# Patient Record
Sex: MALE | Race: WHITE | NOT HISPANIC OR LATINO | Employment: OTHER | URBAN - METROPOLITAN AREA
[De-identification: names, ages, dates, MRNs, and addresses within clinical notes are randomized per-mention and may not be internally consistent; named-entity substitution may affect disease eponyms.]

---

## 2017-10-04 ENCOUNTER — OFFICE VISIT (OUTPATIENT)
Dept: UROLOGY | Facility: CLINIC | Age: 72
End: 2017-10-04
Payer: MEDICARE

## 2017-10-04 VITALS
SYSTOLIC BLOOD PRESSURE: 109 MMHG | DIASTOLIC BLOOD PRESSURE: 71 MMHG | HEIGHT: 73 IN | BODY MASS INDEX: 22.21 KG/M2 | HEART RATE: 77 BPM | WEIGHT: 167.56 LBS

## 2017-10-04 DIAGNOSIS — N52.9 ERECTILE DYSFUNCTION, UNSPECIFIED ERECTILE DYSFUNCTION TYPE: Primary | ICD-10-CM

## 2017-10-04 LAB
BILIRUB SERPL-MCNC: NORMAL MG/DL
BLOOD URINE, POC: NORMAL
COLOR, POC UA: YELLOW
GLUCOSE UR QL STRIP: NORMAL
KETONES UR QL STRIP: NORMAL
LEUKOCYTE ESTERASE URINE, POC: NORMAL
NITRITE, POC UA: NORMAL
PH, POC UA: 5
PROTEIN, POC: NORMAL
SPECIFIC GRAVITY, POC UA: 1.03
UROBILINOGEN, POC UA: NORMAL

## 2017-10-04 PROCEDURE — 99213 OFFICE O/P EST LOW 20 MIN: CPT | Mod: PBBFAC,PO | Performed by: UROLOGY

## 2017-10-04 PROCEDURE — 81002 URINALYSIS NONAUTO W/O SCOPE: CPT | Mod: PBBFAC,PO | Performed by: UROLOGY

## 2017-10-04 PROCEDURE — 99999 PR PBB SHADOW E&M-EST. PATIENT-LVL III: CPT | Mod: PBBFAC,,, | Performed by: UROLOGY

## 2017-10-04 PROCEDURE — 99213 OFFICE O/P EST LOW 20 MIN: CPT | Mod: S$PBB,,, | Performed by: UROLOGY

## 2017-10-04 NOTE — PROGRESS NOTES
Subjective:       Patient ID: CHARISSE Bernal III is a 72 y.o. male.    Chief Complaint: Erectile Dysfunction    HPI     72 year old with a history of kidney stone.  He now complains of ED which has been a long term problem.   He has tried Viagra in the past up to 100 mg which was ineffective.  There was some fullness but the erection was no firm enough for penetration.   He has no voiding complaints.    Urine dipstick shows negative for all components.    Review of Systems   Constitutional: Negative for fever.   Genitourinary: Negative for dysuria and hematuria.       Objective:      Physical Exam   Constitutional: He is oriented to person, place, and time. He appears well-developed and well-nourished.   Pulmonary/Chest: Effort normal.   Abdominal: Soft.   Neurological: He is alert and oriented to person, place, and time.   Skin: No rash noted.   Psychiatric: He has a normal mood and affect.   Vitals reviewed.      Assessment:       1. Erectile dysfunction, unspecified erectile dysfunction type        Plan:       Erectile dysfunction, unspecified erectile dysfunction type  -     POCT URINE DIPSTICK WITHOUT MICROSCOPE      We discussed all treatment options for ED.   Rx Trimix given.  RTC for demo.

## 2017-10-06 ENCOUNTER — TELEPHONE (OUTPATIENT)
Dept: UROLOGY | Facility: CLINIC | Age: 72
End: 2017-10-06

## 2017-10-06 NOTE — TELEPHONE ENCOUNTER
Spoke to pt and scheduled him to get instructions on trimix for next Friday. Pt was saying he was going to figure this out and just give the injection to himself. I instructed him not to do that and that he could end up in the ER.

## 2017-10-06 NOTE — TELEPHONE ENCOUNTER
----- Message from Diana Adkins sent at 10/5/2017  4:53 PM CDT -----  Contact: self [phone]  Call placed to pod. He was calling to schedule an appt, my 1st available is 10/12, he says he needs to be seen sooner.  Please call him about fitting him in.  Thank you!

## 2017-10-13 ENCOUNTER — OFFICE VISIT (OUTPATIENT)
Dept: UROLOGY | Facility: CLINIC | Age: 72
End: 2017-10-13
Payer: MEDICARE

## 2017-10-13 VITALS
BODY MASS INDEX: 22.21 KG/M2 | DIASTOLIC BLOOD PRESSURE: 68 MMHG | HEART RATE: 75 BPM | WEIGHT: 167.56 LBS | HEIGHT: 73 IN | SYSTOLIC BLOOD PRESSURE: 118 MMHG

## 2017-10-13 DIAGNOSIS — N52.9 IMPOTENCE: Primary | ICD-10-CM

## 2017-10-13 PROCEDURE — 99999 PR PBB SHADOW E&M-EST. PATIENT-LVL III: CPT | Mod: PBBFAC,,, | Performed by: UROLOGY

## 2017-10-13 PROCEDURE — 54235 NJX CORPORA CAVERNOSA RX AGT: CPT | Mod: PBBFAC,PO | Performed by: UROLOGY

## 2017-10-13 PROCEDURE — 99213 OFFICE O/P EST LOW 20 MIN: CPT | Mod: PBBFAC,PO,25 | Performed by: UROLOGY

## 2017-10-13 PROCEDURE — 54235 NJX CORPORA CAVERNOSA RX AGT: CPT | Mod: S$PBB,,, | Performed by: UROLOGY

## 2017-10-13 PROCEDURE — 99213 OFFICE O/P EST LOW 20 MIN: CPT | Mod: 25,S$PBB,, | Performed by: UROLOGY

## 2017-10-13 NOTE — PROGRESS NOTES
Subjective:       Patient ID: H Sergei Bernal III is a 72 y.o. male.    Chief Complaint: Follow-up (instructions for Trimix)    HPI     72 year old with ED.  Failed medical management.  He has filled an Rx for WW solution and is here for demonstration.    I demonstrated proper technique and I injected  0.45 cc of WW solution in the the corpora.  Moderate engorgement was noted after 15 minutes.  He had no immediate adverse reaction    Review of Systems   Constitutional: Negative for fever.   Genitourinary: Negative for dysuria and hematuria.       Objective:      Physical Exam   Constitutional: He is oriented to person, place, and time. He appears well-developed and well-nourished.   Pulmonary/Chest: Effort normal.   Abdominal: Soft.   Neurological: He is alert and oriented to person, place, and time.   Skin: No rash noted.   Psychiatric: He has a normal mood and affect.   Vitals reviewed.      Assessment:       1. Impotence        Plan:       Impotence      Titrate dose up as needed.

## 2017-10-26 ENCOUNTER — OFFICE VISIT (OUTPATIENT)
Dept: UROLOGY | Facility: CLINIC | Age: 72
End: 2017-10-26
Payer: MEDICARE

## 2017-10-26 VITALS — WEIGHT: 176.38 LBS | HEIGHT: 73 IN | BODY MASS INDEX: 23.37 KG/M2

## 2017-10-26 DIAGNOSIS — N52.9 IMPOTENCE: Primary | ICD-10-CM

## 2017-10-26 PROCEDURE — 99212 OFFICE O/P EST SF 10 MIN: CPT | Mod: PBBFAC,PO | Performed by: UROLOGY

## 2017-10-26 PROCEDURE — 99213 OFFICE O/P EST LOW 20 MIN: CPT | Mod: S$PBB,,, | Performed by: UROLOGY

## 2017-10-26 PROCEDURE — 99999 PR PBB SHADOW E&M-EST. PATIENT-LVL II: CPT | Mod: PBBFAC,,, | Performed by: UROLOGY

## 2017-10-26 NOTE — PROGRESS NOTES
Subjective:       Patient ID: H Sergei Bernal III is a 72 y.o. male.    Chief Complaint: Follow-up    HPI     72 year old with ED.  Failed medical management.  He has filled an Rx for WW solution and tried it at home and the full dose was not very effective.  He wants to try Caverject which is covered by his insurance.      Review of Systems   Constitutional: Negative for fever.   Genitourinary: Negative for dysuria and hematuria.       Objective:      Physical Exam   Constitutional: He is oriented to person, place, and time. He appears well-developed and well-nourished.   Pulmonary/Chest: Effort normal.   Abdominal: Soft.   Neurological: He is alert and oriented to person, place, and time.   Skin: No rash noted.   Psychiatric: He has a normal mood and affect.   Vitals reviewed.      Assessment:       1. Impotence        Plan:       Impotence    Other orders  -     alprostadil (CAVERJECT IMPULSE) 20 mcg injection; 20 mcg by Intracavitary route as needed for Erectile Dysfunction. use no more than 3 times per week  Dispense: 2 vial; Refill: 6

## 2017-10-27 ENCOUNTER — TELEPHONE (OUTPATIENT)
Dept: UROLOGY | Facility: CLINIC | Age: 72
End: 2017-10-27

## 2017-10-27 NOTE — TELEPHONE ENCOUNTER
----- Message from Meredith Regalado sent at 10/27/2017  3:27 PM CDT -----  [facility] at [phone] called regarding medication: Caverject for above patient. They are requesting Rx clarification on quantity/stated wants two vials. Thanks!

## 2018-01-08 ENCOUNTER — OFFICE VISIT (OUTPATIENT)
Dept: URGENT CARE | Facility: CLINIC | Age: 73
End: 2018-01-08
Payer: MEDICARE

## 2018-01-08 VITALS
DIASTOLIC BLOOD PRESSURE: 71 MMHG | SYSTOLIC BLOOD PRESSURE: 106 MMHG | OXYGEN SATURATION: 98 % | RESPIRATION RATE: 14 BRPM | HEART RATE: 65 BPM | TEMPERATURE: 98 F

## 2018-01-08 DIAGNOSIS — J40 BRONCHITIS: Primary | ICD-10-CM

## 2018-01-08 PROCEDURE — 99203 OFFICE O/P NEW LOW 30 MIN: CPT | Mod: S$GLB,,, | Performed by: PHYSICIAN ASSISTANT

## 2018-01-08 PROCEDURE — 96372 THER/PROPH/DIAG INJ SC/IM: CPT | Mod: S$GLB,,, | Performed by: EMERGENCY MEDICINE

## 2018-01-08 RX ORDER — BETAMETHASONE SODIUM PHOSPHATE AND BETAMETHASONE ACETATE 3; 3 MG/ML; MG/ML
9 INJECTION, SUSPENSION INTRA-ARTICULAR; INTRALESIONAL; INTRAMUSCULAR; SOFT TISSUE ONCE
Status: COMPLETED | OUTPATIENT
Start: 2018-01-08 | End: 2018-01-08

## 2018-01-08 RX ORDER — AZITHROMYCIN 250 MG/1
TABLET, FILM COATED ORAL
Qty: 6 TABLET | Refills: 0 | Status: SHIPPED | OUTPATIENT
Start: 2018-01-08 | End: 2018-06-01

## 2018-01-08 RX ORDER — BENZONATATE 200 MG/1
200 CAPSULE ORAL 3 TIMES DAILY PRN
Qty: 30 CAPSULE | Refills: 0 | Status: SHIPPED | OUTPATIENT
Start: 2018-01-08 | End: 2018-01-18

## 2018-01-08 RX ADMIN — BETAMETHASONE SODIUM PHOSPHATE AND BETAMETHASONE ACETATE 9 MG: 3; 3 INJECTION, SUSPENSION INTRA-ARTICULAR; INTRALESIONAL; INTRAMUSCULAR; SOFT TISSUE at 12:01

## 2018-01-08 NOTE — PROGRESS NOTES
Subjective:       Patient ID: CHARISSE Bernal III is a 72 y.o. male.    Vitals:  oral temperature is 98.2 °F (36.8 °C). His blood pressure is 106/71 and his pulse is 65. His respiration is 14 and oxygen saturation is 98%.     Chief Complaint: Cough    PT C/O PRODUCTIVE COUGH, 1 MONTH, CHEST CONGESTION, SORE THROAT,       Cough   This is a new problem. The current episode started 1 to 4 weeks ago. The problem has been unchanged. The problem occurs constantly. The cough is productive of sputum. Associated symptoms include a sore throat. Pertinent negatives include no chest pain, chills, ear pain, eye redness, fever, headaches, hemoptysis, myalgias, nasal congestion, postnasal drip, rash, shortness of breath or wheezing. He has tried nothing for the symptoms.     Review of Systems   Constitution: Negative for chills, fever and malaise/fatigue.   HENT: Positive for sore throat. Negative for congestion, ear pain, hoarse voice and postnasal drip.    Eyes: Negative for blurred vision, discharge, pain, redness and visual disturbance.   Cardiovascular: Negative for chest pain, dyspnea on exertion, leg swelling, near-syncope and syncope.   Respiratory: Positive for cough and sputum production. Negative for hemoptysis, shortness of breath and wheezing.    Hematologic/Lymphatic: Negative for adenopathy.   Skin: Negative for itching and rash.   Musculoskeletal: Negative for back pain, myalgias, neck pain and stiffness.   Gastrointestinal: Negative for abdominal pain, diarrhea, nausea and vomiting.   Neurological: Negative for dizziness, headaches, light-headedness and numbness.   Psychiatric/Behavioral: Negative for altered mental status.   Allergic/Immunologic: Negative for hives.   All other systems reviewed and are negative.      Objective:      Physical Exam   Constitutional: He is oriented to person, place, and time. He appears well-developed and well-nourished.  Non-toxic appearance. He does not have a sickly appearance. He  does not appear ill. No distress.   HENT:   Head: Normocephalic and atraumatic. Head is without right periorbital erythema and without left periorbital erythema.   Right Ear: Hearing, tympanic membrane, external ear and ear canal normal.   Left Ear: Hearing, tympanic membrane, external ear and ear canal normal.   Nose: No mucosal edema. No epistaxis. Right sinus exhibits no maxillary sinus tenderness and no frontal sinus tenderness. Left sinus exhibits no maxillary sinus tenderness and no frontal sinus tenderness.   Mouth/Throat: Uvula is midline and mucous membranes are normal. No uvula swelling. Posterior oropharyngeal erythema present. No oropharyngeal exudate.   Eyes: Pupils are equal, round, and reactive to light.   Neck: Normal range of motion. Neck supple.   Cardiovascular: Normal rate, regular rhythm and normal heart sounds.  Exam reveals no gallop and no friction rub.    No murmur heard.  Pulmonary/Chest: Effort normal. No accessory muscle usage. No tachypnea and no bradypnea. No respiratory distress. He has no decreased breath sounds. He has no wheezes. He has rhonchi (moderate) in the right middle field and the left middle field. He has no rales. He exhibits no tenderness and no crepitus.   Musculoskeletal: Normal range of motion.   Lymphadenopathy:        Head (right side): No submental, no submandibular, no tonsillar, no preauricular, no posterior auricular and no occipital adenopathy present.        Head (left side): No submental, no submandibular, no tonsillar, no preauricular, no posterior auricular and no occipital adenopathy present.     He has no cervical adenopathy.        Right cervical: No posterior cervical adenopathy present.       Left cervical: No posterior cervical adenopathy present.        Right: No supraclavicular adenopathy present.        Left: No supraclavicular adenopathy present.   Neurological: He is alert and oriented to person, place, and time. He is not disoriented. No cranial  nerve deficit. Coordination and gait normal.   Skin: No abrasion, no ecchymosis, no laceration and no rash noted. No erythema.   Psychiatric: He has a normal mood and affect. His behavior is normal.   Nursing note and vitals reviewed.      Assessment:       1. Bronchitis        Plan:         Bronchitis  -     azithromycin (ZITHROMAX Z-PROSPER) 250 MG tablet; Two tablets once on day one followed by one tablet daily for 5 days  Dispense: 6 tablet; Refill: 0  -     benzonatate (TESSALON) 200 MG capsule; Take 1 capsule (200 mg total) by mouth 3 (three) times daily as needed for Cough.  Dispense: 30 capsule; Refill: 0  -     betamethasone acetate-betamethasone sodium phosphate injection 9 mg; Inject 1.5 mLs (9 mg total) into the muscle once.

## 2018-01-08 NOTE — PATIENT INSTRUCTIONS
- Please return here or go to the Emergency Department for any concerns or worsening of condition.   - If you were prescribed antibiotics, please take them to completion.  - Please follow up with your primary care provider (PCP) or discussed specialist(s) as needed.             Acute Bronchitis  Your healthcare provider has told you that you have acute bronchitis. Bronchitis is infection or inflammation of the bronchial tubes (airways in the lungs). Normally, air moves easily in and out of the airways. Bronchitis narrows the airways, making it harder for air to flow in and out of the lungs. This causes symptoms such as shortness of breath, coughing up yellow or green mucus, and wheezing. Bronchitis can be acute or chronic. Acute means the condition comes on quickly and goes away in a short time, usually within 3 to 10 days. Chronic means a condition lasts a long time and often comes back.    What causes acute bronchitis?  Acute bronchitis almost always starts as a viral respiratory infection, such as a cold or the flu. Certain factors make it more likely for a cold or flu to turn into bronchitis. These include being very young, being elderly, having a heart or lung problem, or having a weak immune system. Cigarette smoking also makes bronchitis more likely.  When bronchitis develops, the airways become swollen. The airways may also become infected with bacteria. This is known as a secondary infection.  Diagnosing acute bronchitis  Your healthcare provider will examine you and ask about your symptoms and health history. You may also have a sputum culture to test the fluid in your lungs. Chest X-rays may be done to look for infection in the lungs.  Treating acute bronchitis  Bronchitis usually clears up as the cold or flu goes away. You can help feel better faster by doing the following:  · Take medicine as directed. You may be told to take ibuprofen or other over-the-counter medicines. These help relieve inflammation  in your bronchial tubes. Your healthcare provider may prescribe an inhaler to help open up the bronchial tubes. Most of the time, acute bronchitis is caused by a viral infection. Antibiotics are usually not prescribed for viral infections.  · Drink plenty of fluids, such as water, juice, or warm soup. Fluids loosen mucus so that you can cough it up. This helps you breathe more easily. Fluids also prevent dehydration.  · Make sure you get plenty of rest.  · Do not smoke. Do not allow anyone else to smoke in your home.  Recovery and follow-up  Follow up with your doctor as you are told. You will likely feel better in a week or two. But a dry cough can linger beyond that time. Let your doctor know if you still have symptoms (other than a dry cough) after 2 weeks, or if youre prone to getting bronchial infections. Take steps to protect yourself from future infections. These steps include stopping smoking and avoiding tobacco smoke, washing your hands often, and getting a yearly flu shot.  When to call your healthcare provider  Call the healthcare provider if you have any of the following:  · Fever of 100.4°F (38.0°C) or higher, or as advised  · Symptoms that get worse, or new symptoms  · Trouble breathing  · Symptoms that dont start to improve within a week, or within 3 days of taking antibiotics   Date Last Reviewed: 12/1/2016  © 8417-3734 [employer]. [address] [place] [address], [place] [address]. All rights reserved. This information is not intended as a substitute for professional medical care. Always follow your healthcare professional's instructions.

## 2018-05-16 ENCOUNTER — OFFICE VISIT (OUTPATIENT)
Dept: URGENT CARE | Facility: CLINIC | Age: 73
End: 2018-05-16
Payer: MEDICARE

## 2018-05-16 VITALS
OXYGEN SATURATION: 96 % | SYSTOLIC BLOOD PRESSURE: 101 MMHG | DIASTOLIC BLOOD PRESSURE: 65 MMHG | HEART RATE: 66 BPM | TEMPERATURE: 98 F | HEIGHT: 69 IN | WEIGHT: 176 LBS | BODY MASS INDEX: 26.07 KG/M2
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
## 2018-05-16 DIAGNOSIS — S80.862A INSECT BITE OF LEFT LOWER EXTREMITY, INITIAL ENCOUNTER: Primary | ICD-10-CM

## 2018-05-16 PROCEDURE — 99203 OFFICE O/P NEW LOW 30 MIN: CPT | Mod: S$GLB,,, | Performed by: EMERGENCY MEDICINE

## 2018-05-16 RX ORDER — MUPIROCIN 20 MG/G
OINTMENT TOPICAL
Qty: 22 G | Refills: 1 | Status: SHIPPED | OUTPATIENT
Start: 2018-05-16 | End: 2021-07-20

## 2018-05-16 RX ORDER — CLINDAMYCIN HYDROCHLORIDE 300 MG/1
300 CAPSULE ORAL 3 TIMES DAILY
Qty: 30 CAPSULE | Refills: 0 | Status: SHIPPED | OUTPATIENT
Start: 2018-05-16 | End: 2018-05-26

## 2018-05-16 NOTE — PATIENT INSTRUCTIONS
Cellulitis  Cellulitis is an infection of the deep layers of skin. A break in the skin, such as a cut or scratch, can let bacteria under the skin. If the bacteria get to deep layers of the skin, it can be serious. If not treated, cellulitis can get into the bloodstream and lymph nodes. The infection can then spread throughout the body. This causes serious illness.  Cellulitis causes the affected skin to become red, swollen, warm, and sore. The reddened areas have a visible border. An open sore may leak fluid (pus). You may have a fever, chills, and pain.  Cellulitis is treated with antibiotics taken for 7 to 10 days. An open sore may be cleaned and covered with cool wet gauze. Symptoms should get better 1 to 2 days after treatment is started. Make sure to take all the antibiotics for the full number of days until they are gone. Keep taking the medicine even if your symptoms go away.  Home care  Follow these tips:  · Limit the use of the part of your body with cellulitis.   · If the infection is on your leg, keep your leg raised while sitting. This will help to reduce swelling.  · Take all of the antibiotic medicine exactly as directed until it is gone. Do not miss any doses, especially during the first 7 days. Dont stop taking the medicine when your symptoms get better.  · Keep the affected area clean and dry.  · Wash your hands with soap and warm water before and after touching your skin. Anyone else who touches your skin should also wash his or her hands. Don't share towels.  Follow-up care  Follow up with your healthcare provider, or as advised. If your infection does not go away on the first antibiotic, your healthcare provider will prescribe a different one.  When to seek medical advice  Call your healthcare provider right away if any of these occur:  · Red areas that spread  · Swelling or pain that gets worse  · Fluid leaking from the skin (pus)  · Fever higher of 100.4º F (38.0º C) or higher after 2 days  on antibiotics  Date Last Reviewed: 9/1/2016  © 0068-1403 [employer]. [address] [place] [address], [place] [address]. All rights reserved. This information is not intended as a substitute for professional medical care. Always follow your healthcare professional's instructions.

## 2018-05-16 NOTE — PROGRESS NOTES
"Subjective:       Patient ID: CHARISSE Bernal III is a 73 y.o. male.    Vitals:  height is 5' 9" (1.753 m) and weight is 79.8 kg (176 lb). His oral temperature is 98.1 °F (36.7 °C). His blood pressure is 101/65 and his pulse is 66. His oxygen saturation is 96%.     Chief Complaint: Insect Bite (left thigh)    Insect Bite   This is a new problem. The current episode started in the past 7 days. The problem occurs daily. The problem has been gradually worsening. Pertinent negatives include no chills, fever, rash or sore throat. The symptoms are aggravated by walking. He has tried heat for the symptoms. The treatment provided mild relief.     Review of Systems   Constitution: Negative for chills and fever.   HENT: Negative for sore throat.    Respiratory: Negative for shortness of breath.    Skin: Positive for color change (warm to the touch, hard). Negative for itching and rash.   Musculoskeletal: Negative for joint pain.       Objective:      Physical Exam   Constitutional: He is oriented to person, place, and time. He appears well-developed and well-nourished.   HENT:   Head: Normocephalic and atraumatic. Head is without abrasion, without contusion and without laceration.   Right Ear: External ear normal.   Left Ear: External ear normal.   Nose: Nose normal.   Mouth/Throat: Oropharynx is clear and moist.   Eyes: Conjunctivae, EOM and lids are normal. Pupils are equal, round, and reactive to light.   Neck: Trachea normal, full passive range of motion without pain and phonation normal. Neck supple.   Cardiovascular: Normal rate, regular rhythm and normal heart sounds.    Pulmonary/Chest: Effort normal and breath sounds normal. No stridor. No respiratory distress.   Musculoskeletal: Normal range of motion.   Neurological: He is alert and oriented to person, place, and time.   Skin: Skin is warm, dry and intact. Capillary refill takes less than 2 seconds. No abrasion, no bruising, no burn, no ecchymosis, no laceration, no " lesion and no rash noted. No erythema.        Psychiatric: He has a normal mood and affect. His speech is normal and behavior is normal. Judgment and thought content normal. Cognition and memory are normal.   Nursing note and vitals reviewed.      Assessment:       1. Insect bite of left lower extremity, initial encounter        Plan:         Insect bite of left lower extremity, initial encounter  -     mupirocin (BACTROBAN) 2 % ointment; Apply to affected area 3 times daily  Dispense: 22 g; Refill: 1  -     clindamycin (CLEOCIN) 300 MG capsule; Take 1 capsule (300 mg total) by mouth 3 (three) times daily.  Dispense: 30 capsule; Refill: 0

## 2018-05-31 ENCOUNTER — CLINICAL SUPPORT (OUTPATIENT)
Dept: UROLOGY | Facility: CLINIC | Age: 73
End: 2018-05-31
Payer: MEDICARE

## 2018-05-31 ENCOUNTER — TELEPHONE (OUTPATIENT)
Dept: UROLOGY | Facility: CLINIC | Age: 73
End: 2018-05-31

## 2018-05-31 DIAGNOSIS — N52.01 ERECTILE DYSFUNCTION DUE TO ARTERIAL INSUFFICIENCY: Primary | ICD-10-CM

## 2018-05-31 PROCEDURE — 99211 OFF/OP EST MAY X REQ PHY/QHP: CPT | Mod: PBBFAC,PO

## 2018-05-31 PROCEDURE — 99999 PR PBB SHADOW E&M-EST. PATIENT-LVL I: CPT | Mod: PBBFAC,,,

## 2018-05-31 NOTE — PROGRESS NOTES
Patient walked in to discuss a refill on his Caverject prescription but wants to double the dose of Caverject.  Explained that he would need to discuss with Dr. Gonzales and an appointment was scheduled for him tomorrow, 6/1/18.

## 2018-05-31 NOTE — TELEPHONE ENCOUNTER
----- Message from Rafaela Jo sent at 5/31/2018 12:35 PM CDT -----  Contact: self cell no. [phone]   Pt came in clinic at 1236 today,, 05 31 2018,,, stated he would like change on his medication,, will come back after lunch and speak to nurse , thanks

## 2018-05-31 NOTE — TELEPHONE ENCOUNTER
Left message for pt to call office back. I relayed this message to pt's wife and she is going to let him know.

## 2018-06-01 ENCOUNTER — OFFICE VISIT (OUTPATIENT)
Dept: UROLOGY | Facility: CLINIC | Age: 73
End: 2018-06-01
Payer: MEDICARE

## 2018-06-01 ENCOUNTER — TELEPHONE (OUTPATIENT)
Dept: UROLOGY | Facility: CLINIC | Age: 73
End: 2018-06-01

## 2018-06-01 VITALS
HEART RATE: 76 BPM | WEIGHT: 168.19 LBS | BODY MASS INDEX: 24.91 KG/M2 | HEIGHT: 69 IN | SYSTOLIC BLOOD PRESSURE: 122 MMHG | DIASTOLIC BLOOD PRESSURE: 70 MMHG

## 2018-06-01 DIAGNOSIS — N52.9 ERECTILE DYSFUNCTION, UNSPECIFIED ERECTILE DYSFUNCTION TYPE: Primary | ICD-10-CM

## 2018-06-01 PROCEDURE — 99213 OFFICE O/P EST LOW 20 MIN: CPT | Mod: S$PBB,,, | Performed by: UROLOGY

## 2018-06-01 PROCEDURE — 99213 OFFICE O/P EST LOW 20 MIN: CPT | Mod: PBBFAC,PO | Performed by: UROLOGY

## 2018-06-01 PROCEDURE — 81002 URINALYSIS NONAUTO W/O SCOPE: CPT | Mod: PBBFAC,PO | Performed by: UROLOGY

## 2018-06-01 PROCEDURE — 99999 PR PBB SHADOW E&M-EST. PATIENT-LVL III: CPT | Mod: PBBFAC,,, | Performed by: UROLOGY

## 2018-06-01 NOTE — PROGRESS NOTES
Subjective:       Patient ID: H Sergei Bernal III is a 73 y.o. male.    Chief Complaint: Follow-up    HPI     72 year old with ED.  Failed medical management.  He has filled an Rx for Caverject 20 mc and has been using it will good results but feels that he could benefit from a higher dose.  He loses his erection when he lays down.    Urine dipstick shows negative for all components.    Review of Systems   Constitutional: Negative for fever.   Genitourinary: Negative for dysuria and hematuria.       Objective:      Physical Exam   Constitutional: He is oriented to person, place, and time. He appears well-developed and well-nourished.   Pulmonary/Chest: Effort normal.   Abdominal: Soft.   Neurological: He is alert and oriented to person, place, and time.   Skin: No rash noted.   Psychiatric: He has a normal mood and affect.   Vitals reviewed.      Assessment:       1. Erectile dysfunction, unspecified erectile dysfunction type        Plan:       Erectile dysfunction, unspecified erectile dysfunction type  -     POCT URINE DIPSTICK WITHOUT MICROSCOPE    Other orders  -     Discontinue: alprostadil 40 mcg SolR; 1 Syringe by Intracavernosal route daily as needed.  Dispense: 6 each; Refill: 11  -     alprostadil (CAVERJECT) 40 mcg SolR; 1 Syringe by Intracavernosal route daily as needed.  Dispense: 6 each; Refill: 11

## 2018-06-01 NOTE — TELEPHONE ENCOUNTER
----- Message from Iveth Rodrgiuez sent at 6/1/2018  1:09 PM CDT -----  Contact: Emi with [facility] [phone]  Emi with [facility] [phone] calling to speak with nurse in office regarding the prescription alprostadil (CAVERJECT) 40 mcg SolR. Please advise. Thanks.

## 2018-06-05 RX ORDER — ALPROSTADIL 20 UG/.5ML
INJECTION, POWDER, LYOPHILIZED, FOR SOLUTION INTRACAVERNOUS
Refills: 0 | OUTPATIENT
Start: 2018-06-05

## 2018-06-05 NOTE — TELEPHONE ENCOUNTER
----- Message from Mayra Vargas sent at 6/5/2018  4:50 PM CDT -----  Contact: Torrie Miguel  Type:  Pharmacy Calling to Clarify an RX    Name of Caller:  Torrie  Pharmacy Name:  [facility]  Prescription Name: Caverject 20 mcg impulse  What do they need to clarify?:  They have the 40 mcg and it is refrigerated and he would have mix it.  He doesn't want this he only wants what he had which is th 20 mcg, it is reloaded in a syringe.    Best Call Back Number:  [phone]  Additional Information:

## 2018-06-06 RX ORDER — ALPROSTADIL 20 UG/.5ML
INJECTION, POWDER, LYOPHILIZED, FOR SOLUTION INTRACAVERNOUS
Qty: 6 VIAL | Refills: 11 | Status: SHIPPED | OUTPATIENT
Start: 2018-06-06 | End: 2021-07-20

## 2018-10-22 ENCOUNTER — DOCUMENTATION ONLY (OUTPATIENT)
Dept: FAMILY MEDICINE | Facility: CLINIC | Age: 73
End: 2018-10-22

## 2018-10-22 NOTE — PROGRESS NOTES
Records from Dr. Kevin Ivy received and placed in appointment accordion for Dr. Anna.  This pt is not scheduled to be seen in this dept until 1/15/19

## 2019-01-02 ENCOUNTER — PATIENT OUTREACH (OUTPATIENT)
Dept: ADMINISTRATIVE | Facility: HOSPITAL | Age: 74
End: 2019-01-02

## 2019-01-02 NOTE — LETTER
January 2, 2019    CHARISSE Bernal III  [address] [address]             [facility]  [address]  [place] [address]  Phone: [phone] Dear Mr. Bernal:    [facility] is committed to your overall health.  To help you get the most out of each of your visits, we will review your information to make sure you are up to date on all of your recommended tests and/or procedures.      NEYDA Ivy MD    has found that your chart shows you may be due for the following:    Tetanus Immunization  Colon cancer screening  Shingles Immunization  Pneumonia Immunization  Influenza Vaccine    If you have had any of the above done at another facility, please bring the records or information with you so that your record at [facility] will be complete.  If you would like to schedule any of these, please contact me.    If you are currently taking medication, please bring it with you to your appointment for review.    Sincerely,    Neetu Brantley  Clinical Care Coordinator  [facility] [address] [place] [address]  Phone: [phone]   Fax: [phone]

## 2019-01-02 NOTE — PROGRESS NOTES
Health Maintenance Due   Topic Date Due    TETANUS VACCINE  05/15/1963    Colonoscopy  05/15/1993    Zoster Vaccine  05/15/2003    Pneumococcal Vaccine (65+ Low/Medium Risk) (1 of 2 - PCV13) 05/15/2008    Influenza Vaccine  08/01/2018     Pre-visit outreach via mail

## 2019-01-15 ENCOUNTER — OFFICE VISIT (OUTPATIENT)
Dept: FAMILY MEDICINE | Facility: CLINIC | Age: 74
End: 2019-01-15
Payer: MEDICARE

## 2019-01-15 VITALS
WEIGHT: 174.81 LBS | SYSTOLIC BLOOD PRESSURE: 112 MMHG | OXYGEN SATURATION: 99 % | BODY MASS INDEX: 25.82 KG/M2 | HEART RATE: 71 BPM | DIASTOLIC BLOOD PRESSURE: 72 MMHG

## 2019-01-15 DIAGNOSIS — Z12.5 ENCOUNTER FOR SCREENING FOR MALIGNANT NEOPLASM OF PROSTATE: ICD-10-CM

## 2019-01-15 DIAGNOSIS — E78.5 DYSLIPIDEMIA: ICD-10-CM

## 2019-01-15 DIAGNOSIS — I25.10 CORONARY ARTERY DISEASE, ANGINA PRESENCE UNSPECIFIED, UNSPECIFIED VESSEL OR LESION TYPE, UNSPECIFIED WHETHER NATIVE OR TRANSPLANTED HEART: ICD-10-CM

## 2019-01-15 DIAGNOSIS — I10 ESSENTIAL HYPERTENSION: Primary | ICD-10-CM

## 2019-01-15 PROCEDURE — 99999 PR PBB SHADOW E&M-EST. PATIENT-LVL III: ICD-10-PCS | Mod: PBBFAC,,, | Performed by: INTERNAL MEDICINE

## 2019-01-15 PROCEDURE — 99214 PR OFFICE/OUTPT VISIT, EST, LEVL IV, 30-39 MIN: ICD-10-PCS | Mod: S$PBB,,, | Performed by: INTERNAL MEDICINE

## 2019-01-15 PROCEDURE — 99214 OFFICE O/P EST MOD 30 MIN: CPT | Mod: S$PBB,,, | Performed by: INTERNAL MEDICINE

## 2019-01-15 PROCEDURE — 99213 OFFICE O/P EST LOW 20 MIN: CPT | Mod: PBBFAC,PO | Performed by: INTERNAL MEDICINE

## 2019-01-15 PROCEDURE — 99999 PR PBB SHADOW E&M-EST. PATIENT-LVL III: CPT | Mod: PBBFAC,,, | Performed by: INTERNAL MEDICINE

## 2019-01-15 NOTE — PROGRESS NOTES
Subjective:       Patient ID: CHARISSE Bernal III is a 73 y.o. male.    Chief Complaint: Establish Care    New patient here to establish care.  CAD s/p 2 stents. No chest pain.  Dr Hernandez.   HTN - controlled  HLD - controlled  ED - controlled with injections. Dr Gonzales     Send labs to Dr Hernandez      Review of Systems   Constitutional: Negative for appetite change and fever.   HENT: Negative for nosebleeds and trouble swallowing.    Eyes: Negative for discharge and visual disturbance.   Respiratory: Negative for choking and shortness of breath.    Cardiovascular: Negative for chest pain and palpitations.   Gastrointestinal: Negative for abdominal pain, nausea and vomiting.   Musculoskeletal: Negative for arthralgias and joint swelling.   Skin: Negative for rash and wound.   Neurological: Negative for dizziness and syncope.   Psychiatric/Behavioral: Negative for confusion and dysphoric mood.       Objective:      Vitals:    01/15/19 1606   BP: 112/72   Pulse: 71     Physical Exam   Constitutional: He appears well-nourished.   Eyes: Conjunctivae and EOM are normal.   Neck: Trachea normal and normal range of motion. No thyromegaly present.   Cardiovascular: Normal heart sounds.   Edema negative   Pulmonary/Chest: Effort normal and breath sounds normal.   Abdominal: Soft. There is no hepatomegaly.   Neurological: No cranial nerve deficit.   DTR decreased bilateral   Skin: Skin is warm, dry and intact.   Psychiatric: He has a normal mood and affect.   Alert and Oriented    Vitals reviewed.        Assessment:       1. Essential hypertension    2. Dyslipidemia    3. Coronary artery disease, angina presence unspecified, unspecified vessel or lesion type, unspecified whether native or transplanted heart    4. Encounter for screening for malignant neoplasm of prostate        Plan:       Essential hypertension  -     Comprehensive metabolic panel; Future; Expected date: 01/15/2019  -     Comprehensive metabolic panel;  "Future; Expected date: 07/14/2019    Dyslipidemia  -     Lipid panel; Future; Expected date: 01/15/2019  -     Lipid panel; Future; Expected date: 07/14/2019    Coronary artery disease, angina presence unspecified, unspecified vessel or lesion type, unspecified whether native or transplanted heart    Encounter for screening for malignant neoplasm of prostate  -     PSA, Screening; Future; Expected date: 01/15/2019               Medication List           Accurate as of 1/15/19  4:11 PM. If you have any questions, ask your nurse or doctor.               CONTINUE taking these medications    * alprostadil 40 mcg Solr  Commonly known as:  CAVERJECT  1 Syringe by Intracavernosal route daily as needed.     * CAVERJECT IMPULSE 20 mcg injection  Generic drug:  alprostadil  AS DIRECTED. NO MORE THAN 3 PER TIMES A WEEK     aspirin 81 MG EC tablet  Commonly known as:  ECOTRIN     atorvastatin 80 MG tablet  Commonly known as:  LIPITOR     ibuprofen 200 MG tablet  Commonly known as:  ADVIL,MOTRIN     mupirocin 2 % ointment  Commonly known as:  BACTROBAN  Apply to affected area 3 times daily     perindopril erbumine 4 mg tablet  Commonly known as:  ACEON         * This list has 2 medication(s) that are the same as other medications prescribed for you. Read the directions carefully, and ask your doctor or other care provider to review them with you.              Vac rx  [facility] rx to Dr Goodman   Continue current management    Counseled on regular exercise, maintenance of a healthy weight, balanced diet rich in fruits/vegetables and lean protein, and avoidance of unhealthy habits like smoking and excessive alcohol intake.   Also, counseled on importance of being compliant with medication, health appointments, diet and exercise.     Follow-up in about 6 months (around 7/15/2019). c vs mc; send lab dr bowers     "This note will not be shared with the patient."  "

## 2019-01-16 ENCOUNTER — LAB VISIT (OUTPATIENT)
Dept: LAB | Facility: HOSPITAL | Age: 74
End: 2019-01-16
Attending: INTERNAL MEDICINE
Payer: MEDICARE

## 2019-01-16 DIAGNOSIS — Z12.5 ENCOUNTER FOR SCREENING FOR MALIGNANT NEOPLASM OF PROSTATE: ICD-10-CM

## 2019-01-16 DIAGNOSIS — E78.5 DYSLIPIDEMIA: ICD-10-CM

## 2019-01-16 DIAGNOSIS — I10 ESSENTIAL HYPERTENSION: ICD-10-CM

## 2019-01-16 LAB
ALBUMIN SERPL BCP-MCNC: 3.7 G/DL
ALP SERPL-CCNC: 94 U/L
ALT SERPL W/O P-5'-P-CCNC: 16 U/L
ANION GAP SERPL CALC-SCNC: 7 MMOL/L
AST SERPL-CCNC: 20 U/L
BILIRUB SERPL-MCNC: 0.9 MG/DL
BUN SERPL-MCNC: 19 MG/DL
CALCIUM SERPL-MCNC: 9.3 MG/DL
CHLORIDE SERPL-SCNC: 109 MMOL/L
CHOLEST SERPL-MCNC: 161 MG/DL
CHOLEST/HDLC SERPL: 2.4 {RATIO}
CO2 SERPL-SCNC: 28 MMOL/L
COMPLEXED PSA SERPL-MCNC: 0.73 NG/ML
CREAT SERPL-MCNC: 0.8 MG/DL
EST. GFR  (AFRICAN AMERICAN): >60 ML/MIN/1.73 M^2
EST. GFR  (NON AFRICAN AMERICAN): >60 ML/MIN/1.73 M^2
GLUCOSE SERPL-MCNC: 91 MG/DL
HDLC SERPL-MCNC: 67 MG/DL
HDLC SERPL: 41.6 %
LDLC SERPL CALC-MCNC: 84.4 MG/DL
NONHDLC SERPL-MCNC: 94 MG/DL
POTASSIUM SERPL-SCNC: 4.2 MMOL/L
PROT SERPL-MCNC: 6.8 G/DL
SODIUM SERPL-SCNC: 144 MMOL/L
TRIGL SERPL-MCNC: 48 MG/DL

## 2019-01-16 PROCEDURE — 80061 LIPID PANEL: CPT

## 2019-01-16 PROCEDURE — 80053 COMPREHEN METABOLIC PANEL: CPT

## 2019-01-16 PROCEDURE — 36415 COLL VENOUS BLD VENIPUNCTURE: CPT | Mod: PO

## 2019-01-16 PROCEDURE — 84153 ASSAY OF PSA TOTAL: CPT

## 2019-01-22 ENCOUNTER — TELEPHONE (OUTPATIENT)
Dept: FAMILY MEDICINE | Facility: CLINIC | Age: 74
End: 2019-01-22

## 2019-01-22 ENCOUNTER — TELEPHONE (OUTPATIENT)
Dept: GASTROENTEROLOGY | Facility: CLINIC | Age: 74
End: 2019-01-22

## 2019-01-22 ENCOUNTER — IMMUNIZATION (OUTPATIENT)
Dept: PHARMACY | Facility: CLINIC | Age: 74
End: 2019-01-22
Payer: MEDICARE

## 2019-01-22 ENCOUNTER — CLINICAL SUPPORT (OUTPATIENT)
Dept: FAMILY MEDICINE | Facility: CLINIC | Age: 74
End: 2019-01-22
Payer: MEDICARE

## 2019-01-22 DIAGNOSIS — Z23 NEED FOR VACCINATION WITH 13-POLYVALENT PNEUMOCOCCAL CONJUGATE VACCINE: Primary | ICD-10-CM

## 2019-01-22 PROCEDURE — 99211 OFF/OP EST MAY X REQ PHY/QHP: CPT | Mod: PBBFAC,PO

## 2019-01-22 PROCEDURE — 99999 PR PBB SHADOW E&M-EST. PATIENT-LVL I: ICD-10-PCS | Mod: PBBFAC,,,

## 2019-01-22 PROCEDURE — 99999 PR PBB SHADOW E&M-EST. PATIENT-LVL I: CPT | Mod: PBBFAC,,,

## 2019-01-22 PROCEDURE — G0009 ADMIN PNEUMOCOCCAL VACCINE: HCPCS | Mod: PBBFAC,PO

## 2019-01-22 RX ORDER — ATORVASTATIN CALCIUM 80 MG/1
80 TABLET, FILM COATED ORAL DAILY
Qty: 30 TABLET | Refills: 2 | Status: CANCELLED | OUTPATIENT
Start: 2019-01-22

## 2019-01-22 RX ORDER — PERINDOPRIL ERBUMINE 4 MG/1
4 TABLET ORAL DAILY
Qty: 30 TABLET | Refills: 2 | Status: CANCELLED | OUTPATIENT
Start: 2019-01-22

## 2019-01-22 NOTE — TELEPHONE ENCOUNTER
----- Message from Tangela Moeller sent at 1/22/2019  2:39 PM CST -----  Contact: self  Patient needs the following meds refilled:    Atorvastatin 80 mg (90 day supply)    Perindopril 4 mg (90 day supply)    If any further questions, please contact patient at [phone]    Thanks!

## 2019-01-22 NOTE — TELEPHONE ENCOUNTER
----- Message from Tangela Moeller sent at 1/22/2019  2:46 PM CST -----  Contact: self  Patient was referred by Dr. Anna to have a colonoscopy.    Please contact patient at [phone] to schedule.    Thanks!

## 2019-01-22 NOTE — TELEPHONE ENCOUNTER
Shingrex not covered by pharmacy. Pended.    I have entered and signed per WOG for prevnar, but do not have for Shingrex.

## 2019-01-23 RX ORDER — ATORVASTATIN CALCIUM 80 MG/1
80 TABLET, FILM COATED ORAL DAILY
Qty: 30 TABLET | Refills: 6 | Status: SHIPPED | OUTPATIENT
Start: 2019-01-23 | End: 2019-01-25 | Stop reason: SDUPTHER

## 2019-01-23 RX ORDER — PERINDOPRIL ERBUMINE 4 MG/1
4 TABLET ORAL DAILY
Qty: 30 TABLET | Refills: 6 | Status: SHIPPED | OUTPATIENT
Start: 2019-01-23 | End: 2019-01-25 | Stop reason: SDUPTHER

## 2019-01-23 NOTE — TELEPHONE ENCOUNTER
----- Message from Ray Anna MD sent at 1/17/2019  9:57 AM CST -----  Please call patient and inform: I have reviewed your results and they are acceptable.  There are no significant abnormalities that are identified.    I have reviewed your cholesterol profile, which is composed of your:   total cholesterol,   LDL- lousy cholesterol that causes plaques,  HDL - healthy cholesterol that removes LDL, and  Triglycerides - when elevated, a risk factor of heart disease and associated with high LDL levels)   Your LDL is mildly high.  More regular exercise (such as half-hour fast walk or equivalent daily), decreasing your saturated fats (fried foods, ground meat, etc) and losing weight will help raise your HDL (good cholesterol) and lower your LDL (bad cholesterol).    If you have any further questions regarding these results, please contact me.  Nurse please send copy of labs to Dr Stevie Hernandez, cardiology

## 2019-01-25 RX ORDER — PERINDOPRIL ERBUMINE 4 MG/1
4 TABLET ORAL DAILY
Qty: 90 TABLET | Refills: 1 | Status: SHIPPED | OUTPATIENT
Start: 2019-01-25 | End: 2019-07-17 | Stop reason: SDUPTHER

## 2019-01-25 RX ORDER — ATORVASTATIN CALCIUM 80 MG/1
80 TABLET, FILM COATED ORAL DAILY
Qty: 90 TABLET | Refills: 1 | Status: SHIPPED | OUTPATIENT
Start: 2019-01-25 | End: 2019-07-17 | Stop reason: SDUPTHER

## 2019-01-25 NOTE — TELEPHONE ENCOUNTER
----- Message from Latrice Mabry sent at 1/25/2019 11:49 AM CST -----  Contact: patient  Type:  Patient Returning Call    Who Called:  patient  Who Left Message for Patient:  Mary Ann  Does the patient know what this is regarding?:  yes  Best Call Back Number:  [phone]  Additional Information:  Requesting a call back

## 2019-02-13 ENCOUNTER — TELEPHONE (OUTPATIENT)
Dept: FAMILY MEDICINE | Facility: CLINIC | Age: 74
End: 2019-02-13

## 2019-07-03 ENCOUNTER — PATIENT OUTREACH (OUTPATIENT)
Dept: ADMINISTRATIVE | Facility: HOSPITAL | Age: 74
End: 2019-07-03

## 2019-07-03 NOTE — PROGRESS NOTES
Health Maintenance Due   Topic Date Due    Abdominal Aortic Aneurysm Screening  05/15/2008       Chart review completed 07/03/2019

## 2019-07-10 ENCOUNTER — LAB VISIT (OUTPATIENT)
Dept: LAB | Facility: HOSPITAL | Age: 74
End: 2019-07-10
Attending: INTERNAL MEDICINE
Payer: MEDICARE

## 2019-07-10 DIAGNOSIS — I10 ESSENTIAL HYPERTENSION: ICD-10-CM

## 2019-07-10 DIAGNOSIS — E78.5 DYSLIPIDEMIA: ICD-10-CM

## 2019-07-10 LAB
ALBUMIN SERPL BCP-MCNC: 3.7 G/DL (ref 3.5–5.2)
ALP SERPL-CCNC: 86 U/L (ref 55–135)
ALT SERPL W/O P-5'-P-CCNC: 16 U/L (ref 10–44)
ANION GAP SERPL CALC-SCNC: 8 MMOL/L (ref 8–16)
AST SERPL-CCNC: 18 U/L (ref 10–40)
BILIRUB SERPL-MCNC: 0.8 MG/DL (ref 0.1–1)
BUN SERPL-MCNC: 16 MG/DL (ref 8–23)
CALCIUM SERPL-MCNC: 9.4 MG/DL (ref 8.7–10.5)
CHLORIDE SERPL-SCNC: 109 MMOL/L (ref 95–110)
CHOLEST SERPL-MCNC: 160 MG/DL (ref 120–199)
CHOLEST/HDLC SERPL: 2.5 {RATIO} (ref 2–5)
CO2 SERPL-SCNC: 28 MMOL/L (ref 23–29)
CREAT SERPL-MCNC: 0.9 MG/DL (ref 0.5–1.4)
EST. GFR  (AFRICAN AMERICAN): >60 ML/MIN/1.73 M^2
EST. GFR  (NON AFRICAN AMERICAN): >60 ML/MIN/1.73 M^2
GLUCOSE SERPL-MCNC: 91 MG/DL (ref 70–110)
HDLC SERPL-MCNC: 65 MG/DL (ref 40–75)
HDLC SERPL: 40.6 % (ref 20–50)
LDLC SERPL CALC-MCNC: 83.2 MG/DL (ref 63–159)
NONHDLC SERPL-MCNC: 95 MG/DL
POTASSIUM SERPL-SCNC: 4.4 MMOL/L (ref 3.5–5.1)
PROT SERPL-MCNC: 6.3 G/DL (ref 6–8.4)
SODIUM SERPL-SCNC: 145 MMOL/L (ref 136–145)
TRIGL SERPL-MCNC: 59 MG/DL (ref 30–150)

## 2019-07-10 PROCEDURE — 80061 LIPID PANEL: CPT

## 2019-07-10 PROCEDURE — 36415 COLL VENOUS BLD VENIPUNCTURE: CPT | Mod: PO

## 2019-07-10 PROCEDURE — 80053 COMPREHEN METABOLIC PANEL: CPT

## 2019-07-17 ENCOUNTER — OFFICE VISIT (OUTPATIENT)
Dept: FAMILY MEDICINE | Facility: CLINIC | Age: 74
End: 2019-07-17
Payer: MEDICARE

## 2019-07-17 ENCOUNTER — TELEPHONE (OUTPATIENT)
Dept: FAMILY MEDICINE | Facility: CLINIC | Age: 74
End: 2019-07-17

## 2019-07-17 VITALS
DIASTOLIC BLOOD PRESSURE: 78 MMHG | SYSTOLIC BLOOD PRESSURE: 104 MMHG | WEIGHT: 169.31 LBS | HEART RATE: 66 BPM | BODY MASS INDEX: 25.08 KG/M2 | OXYGEN SATURATION: 95 % | HEIGHT: 69 IN

## 2019-07-17 DIAGNOSIS — E78.5 DYSLIPIDEMIA: ICD-10-CM

## 2019-07-17 DIAGNOSIS — Z13.6 SCREENING FOR AAA (ABDOMINAL AORTIC ANEURYSM): ICD-10-CM

## 2019-07-17 DIAGNOSIS — I10 ESSENTIAL HYPERTENSION: Primary | ICD-10-CM

## 2019-07-17 DIAGNOSIS — N52.9 ERECTILE DYSFUNCTION, UNSPECIFIED ERECTILE DYSFUNCTION TYPE: ICD-10-CM

## 2019-07-17 PROCEDURE — 99214 PR OFFICE/OUTPT VISIT, EST, LEVL IV, 30-39 MIN: ICD-10-PCS | Mod: S$PBB,,, | Performed by: INTERNAL MEDICINE

## 2019-07-17 PROCEDURE — 99214 OFFICE O/P EST MOD 30 MIN: CPT | Mod: S$PBB,,, | Performed by: INTERNAL MEDICINE

## 2019-07-17 PROCEDURE — 99999 PR PBB SHADOW E&M-EST. PATIENT-LVL III: CPT | Mod: PBBFAC,,, | Performed by: INTERNAL MEDICINE

## 2019-07-17 PROCEDURE — 99213 OFFICE O/P EST LOW 20 MIN: CPT | Mod: PBBFAC,PO | Performed by: INTERNAL MEDICINE

## 2019-07-17 PROCEDURE — 99999 PR PBB SHADOW E&M-EST. PATIENT-LVL III: ICD-10-PCS | Mod: PBBFAC,,, | Performed by: INTERNAL MEDICINE

## 2019-07-17 RX ORDER — SILDENAFIL 100 MG/1
TABLET, FILM COATED ORAL
Refills: 2 | COMMUNITY
Start: 2019-05-06 | End: 2019-07-17 | Stop reason: SDUPTHER

## 2019-07-17 RX ORDER — EZETIMIBE 10 MG/1
10 TABLET ORAL DAILY
Qty: 90 TABLET | Refills: 3 | Status: SHIPPED | OUTPATIENT
Start: 2019-07-17 | End: 2020-01-17 | Stop reason: SDUPTHER

## 2019-07-17 RX ORDER — SILDENAFIL 100 MG/1
TABLET, FILM COATED ORAL
Qty: 6 TABLET | Refills: 11 | Status: SHIPPED | OUTPATIENT
Start: 2019-07-17 | End: 2020-01-17 | Stop reason: SDUPTHER

## 2019-07-17 RX ORDER — PERINDOPRIL ERBUMINE 4 MG/1
4 TABLET ORAL DAILY
Qty: 90 TABLET | Refills: 2 | Status: SHIPPED | OUTPATIENT
Start: 2019-07-17 | End: 2020-01-17 | Stop reason: SDUPTHER

## 2019-07-17 RX ORDER — ATORVASTATIN CALCIUM 80 MG/1
80 TABLET, FILM COATED ORAL DAILY
Qty: 90 TABLET | Refills: 2 | Status: SHIPPED | OUTPATIENT
Start: 2019-07-17 | End: 2020-01-17 | Stop reason: SDUPTHER

## 2019-07-17 NOTE — PROGRESS NOTES
Subjective:       Patient ID: Srinivasan Bernal III is a 74 y.o. male.    Chief Complaint: Hypertension    CAD s/p 2 stents. No chest pain.  Dr Hernandez.   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   HENT: Negative for hearing loss, nosebleeds, rhinorrhea and trouble swallowing.    Eyes: Negative for discharge and visual disturbance.   Respiratory: Negative for choking, chest tightness, shortness of breath and wheezing.    Cardiovascular: Negative for chest pain and palpitations.   Gastrointestinal: Negative for abdominal pain, blood in stool, constipation, diarrhea, nausea and vomiting.   Endocrine: Negative for polydipsia and polyuria.   Genitourinary: Negative for difficulty urinating, hematuria and urgency.   Musculoskeletal: Negative for arthralgias, joint swelling and neck pain.   Skin: Negative for rash and wound.   Neurological: Negative for dizziness, syncope, weakness and headaches.   Psychiatric/Behavioral: Negative for confusion and dysphoric mood.       Objective:      Vitals:    07/17/19 1025   BP: 104/78   Pulse: 66     Physical Exam   Constitutional: He appears well-nourished.   Eyes: Conjunctivae and EOM are normal.   Neck: Normal range of motion.   Cardiovascular: Normal rate and regular rhythm.   Pulmonary/Chest: Effort normal and breath sounds normal.   Musculoskeletal:   Normal ROM bilateral    Neurological: No cranial nerve deficit (grossly intact).   Skin: Skin is warm and dry.   Psychiatric: He has a normal mood and affect.   Alert and orientated   Vitals reviewed.        Assessment:       1. Essential hypertension    2. Dyslipidemia    3. Erectile dysfunction, unspecified erectile  dysfunction type    4. Screening for AAA (abdominal aortic aneurysm)        Plan:       Essential hypertension  -     Comprehensive metabolic panel; Future; Expected date: 01/13/2020  -     CBC auto differential; Future; Expected date: 01/13/2020  -     perindopril erbumine (ACEON) 4 mg tablet; Take 1 tablet (4 mg total) by mouth once daily.  Dispense: 90 tablet; Refill: 2    Dyslipidemia  -     Lipid panel; Future; Expected date: 01/13/2020  -     CBC auto differential; Future; Expected date: 01/13/2020  -     ezetimibe (ZETIA) 10 mg tablet; Take 1 tablet (10 mg total) by mouth once daily.  Dispense: 90 tablet; Refill: 3  -     atorvastatin (LIPITOR) 80 MG tablet; Take 1 tablet (80 mg total) by mouth once daily.  Dispense: 90 tablet; Refill: 2    Erectile dysfunction, unspecified erectile dysfunction type  -     CBC auto differential; Future; Expected date: 01/13/2020  -     sildenafil (VIAGRA) 100 MG tablet; TK 1 T PO dialy PRN  Dispense: 6 tablet; Refill: 11    Screening for AAA (abdominal aortic aneurysm)  -     US Abdominal Aorta; Future; Expected date: 07/17/2019            Medication List with Changes/Refills   New Medications    EZETIMIBE (ZETIA) 10 MG TABLET    Take 1 tablet (10 mg total) by mouth once daily.   Current Medications    ASPIRIN (ECOTRIN) 81 MG EC TABLET    Take 81 mg by mouth once daily.    CAVERJECT IMPULSE 20 MCG INJECTION    AS DIRECTED. NO MORE THAN 3 PER TIMES A WEEK    IBUPROFEN (ADVIL,MOTRIN) 200 MG TABLET    Take 200 mg by mouth every 6 (six) hours as needed for Pain.    MUPIROCIN (BACTROBAN) 2 % OINTMENT    Apply to affected area 3 times daily   Changed and/or Refilled Medications    Modified Medication Previous Medication    ATORVASTATIN (LIPITOR) 80 MG TABLET atorvastatin (LIPITOR) 80 MG tablet       Take 1 tablet (80 mg total) by mouth once daily.    Take 1 tablet (80 mg total) by mouth once daily.    PERINDOPRIL ERBUMINE (ACEON) 4 MG TABLET perindopril erbumine (ACEON) 4 mg tablet        Take 1 tablet (4 mg total) by mouth once daily.    Take 1 tablet (4 mg total) by mouth once daily.    SILDENAFIL (VIAGRA) 100 MG TABLET sildenafil (VIAGRA) 100 MG tablet       TK 1 T PO dialy PRN    TK 1 T PO PRN       Continue current management and monitor.    Counseled on regular exercise, maintenance of a healthy weight, balanced diet rich in fruits/vegetables and lean protein, and avoidance of unhealthy habits like smoking and excessive alcohol intake.   Also, counseled on importance of being compliant with medication, health appointments, diet and exercise.     Follow up in about 6 months (around 1/17/2020).

## 2019-10-23 ENCOUNTER — IMMUNIZATION (OUTPATIENT)
Dept: PHARMACY | Facility: CLINIC | Age: 74
End: 2019-10-23
Payer: MEDICARE

## 2020-01-13 ENCOUNTER — LAB VISIT (OUTPATIENT)
Dept: LAB | Facility: HOSPITAL | Age: 75
End: 2020-01-13
Attending: INTERNAL MEDICINE
Payer: MEDICARE

## 2020-01-13 ENCOUNTER — IMMUNIZATION (OUTPATIENT)
Dept: PHARMACY | Facility: CLINIC | Age: 75
End: 2020-01-13
Payer: MEDICARE

## 2020-01-13 DIAGNOSIS — N52.9 ERECTILE DYSFUNCTION, UNSPECIFIED ERECTILE DYSFUNCTION TYPE: ICD-10-CM

## 2020-01-13 DIAGNOSIS — I10 ESSENTIAL HYPERTENSION: ICD-10-CM

## 2020-01-13 DIAGNOSIS — E78.5 DYSLIPIDEMIA: ICD-10-CM

## 2020-01-13 LAB
ALBUMIN SERPL BCP-MCNC: 3.6 G/DL (ref 3.5–5.2)
ALP SERPL-CCNC: 84 U/L (ref 55–135)
ALT SERPL W/O P-5'-P-CCNC: 25 U/L (ref 10–44)
ANION GAP SERPL CALC-SCNC: 4 MMOL/L (ref 8–16)
AST SERPL-CCNC: 22 U/L (ref 10–40)
BASOPHILS # BLD AUTO: 0.06 K/UL (ref 0–0.2)
BASOPHILS NFR BLD: 1.1 % (ref 0–1.9)
BILIRUB SERPL-MCNC: 0.8 MG/DL (ref 0.1–1)
BUN SERPL-MCNC: 17 MG/DL (ref 8–23)
CALCIUM SERPL-MCNC: 9.2 MG/DL (ref 8.7–10.5)
CHLORIDE SERPL-SCNC: 110 MMOL/L (ref 95–110)
CHOLEST SERPL-MCNC: 139 MG/DL (ref 120–199)
CHOLEST/HDLC SERPL: 2.2 {RATIO} (ref 2–5)
CO2 SERPL-SCNC: 30 MMOL/L (ref 23–29)
CREAT SERPL-MCNC: 0.9 MG/DL (ref 0.5–1.4)
DIFFERENTIAL METHOD: ABNORMAL
EOSINOPHIL # BLD AUTO: 0.2 K/UL (ref 0–0.5)
EOSINOPHIL NFR BLD: 3 % (ref 0–8)
ERYTHROCYTE [DISTWIDTH] IN BLOOD BY AUTOMATED COUNT: 12.6 % (ref 11.5–14.5)
EST. GFR  (AFRICAN AMERICAN): >60 ML/MIN/1.73 M^2
EST. GFR  (NON AFRICAN AMERICAN): >60 ML/MIN/1.73 M^2
GLUCOSE SERPL-MCNC: 89 MG/DL (ref 70–110)
HCT VFR BLD AUTO: 46.5 % (ref 40–54)
HDLC SERPL-MCNC: 64 MG/DL (ref 40–75)
HDLC SERPL: 46 % (ref 20–50)
HGB BLD-MCNC: 14.8 G/DL (ref 14–18)
IMM GRANULOCYTES # BLD AUTO: 0.01 K/UL (ref 0–0.04)
IMM GRANULOCYTES NFR BLD AUTO: 0.2 % (ref 0–0.5)
LDLC SERPL CALC-MCNC: 66.4 MG/DL (ref 63–159)
LYMPHOCYTES # BLD AUTO: 1.4 K/UL (ref 1–4.8)
LYMPHOCYTES NFR BLD: 26.3 % (ref 18–48)
MCH RBC QN AUTO: 30.5 PG (ref 27–31)
MCHC RBC AUTO-ENTMCNC: 31.8 G/DL (ref 32–36)
MCV RBC AUTO: 96 FL (ref 82–98)
MONOCYTES # BLD AUTO: 0.4 K/UL (ref 0.3–1)
MONOCYTES NFR BLD: 7.9 % (ref 4–15)
NEUTROPHILS # BLD AUTO: 3.3 K/UL (ref 1.8–7.7)
NEUTROPHILS NFR BLD: 61.5 % (ref 38–73)
NONHDLC SERPL-MCNC: 75 MG/DL
NRBC BLD-RTO: 0 /100 WBC
PLATELET # BLD AUTO: 174 K/UL (ref 150–350)
PMV BLD AUTO: 9.9 FL (ref 9.2–12.9)
POTASSIUM SERPL-SCNC: 4.5 MMOL/L (ref 3.5–5.1)
PROT SERPL-MCNC: 6.2 G/DL (ref 6–8.4)
RBC # BLD AUTO: 4.85 M/UL (ref 4.6–6.2)
SODIUM SERPL-SCNC: 144 MMOL/L (ref 136–145)
TRIGL SERPL-MCNC: 43 MG/DL (ref 30–150)
WBC # BLD AUTO: 5.33 K/UL (ref 3.9–12.7)

## 2020-01-13 PROCEDURE — 80061 LIPID PANEL: CPT

## 2020-01-13 PROCEDURE — 36415 COLL VENOUS BLD VENIPUNCTURE: CPT | Mod: PO

## 2020-01-13 PROCEDURE — 85025 COMPLETE CBC W/AUTO DIFF WBC: CPT

## 2020-01-13 PROCEDURE — 80053 COMPREHEN METABOLIC PANEL: CPT

## 2020-01-17 ENCOUNTER — OFFICE VISIT (OUTPATIENT)
Dept: FAMILY MEDICINE | Facility: CLINIC | Age: 75
End: 2020-01-17
Payer: MEDICARE

## 2020-01-17 VITALS
BODY MASS INDEX: 23.23 KG/M2 | SYSTOLIC BLOOD PRESSURE: 112 MMHG | HEIGHT: 72 IN | OXYGEN SATURATION: 97 % | WEIGHT: 171.5 LBS | HEART RATE: 74 BPM | DIASTOLIC BLOOD PRESSURE: 70 MMHG

## 2020-01-17 DIAGNOSIS — N52.9 ERECTILE DYSFUNCTION, UNSPECIFIED ERECTILE DYSFUNCTION TYPE: ICD-10-CM

## 2020-01-17 DIAGNOSIS — Z00.00 ROUTINE PHYSICAL EXAMINATION: Primary | ICD-10-CM

## 2020-01-17 DIAGNOSIS — M79.672 LEFT FOOT PAIN: ICD-10-CM

## 2020-01-17 DIAGNOSIS — I25.10 CORONARY ARTERY DISEASE, ANGINA PRESENCE UNSPECIFIED, UNSPECIFIED VESSEL OR LESION TYPE, UNSPECIFIED WHETHER NATIVE OR TRANSPLANTED HEART: ICD-10-CM

## 2020-01-17 DIAGNOSIS — E78.5 DYSLIPIDEMIA: ICD-10-CM

## 2020-01-17 DIAGNOSIS — I10 ESSENTIAL HYPERTENSION: ICD-10-CM

## 2020-01-17 PROCEDURE — 99999 PR PBB SHADOW E&M-EST. PATIENT-LVL III: CPT | Mod: PBBFAC,,, | Performed by: INTERNAL MEDICINE

## 2020-01-17 PROCEDURE — 99213 PR OFFICE/OUTPT VISIT, EST, LEVL III, 20-29 MIN: ICD-10-PCS | Mod: S$PBB,,, | Performed by: INTERNAL MEDICINE

## 2020-01-17 PROCEDURE — 1159F PR MEDICATION LIST DOCUMENTED IN MEDICAL RECORD: ICD-10-PCS | Mod: ,,, | Performed by: INTERNAL MEDICINE

## 2020-01-17 PROCEDURE — 99213 OFFICE O/P EST LOW 20 MIN: CPT | Mod: S$PBB,,, | Performed by: INTERNAL MEDICINE

## 2020-01-17 PROCEDURE — 99213 OFFICE O/P EST LOW 20 MIN: CPT | Mod: PBBFAC,PO | Performed by: INTERNAL MEDICINE

## 2020-01-17 PROCEDURE — 1159F MED LIST DOCD IN RCRD: CPT | Mod: ,,, | Performed by: INTERNAL MEDICINE

## 2020-01-17 PROCEDURE — 99999 PR PBB SHADOW E&M-EST. PATIENT-LVL III: ICD-10-PCS | Mod: PBBFAC,,, | Performed by: INTERNAL MEDICINE

## 2020-01-17 RX ORDER — SILDENAFIL 100 MG/1
TABLET, FILM COATED ORAL
Qty: 6 TABLET | Refills: 11 | Status: SHIPPED | OUTPATIENT
Start: 2020-01-17 | End: 2021-01-20

## 2020-01-17 RX ORDER — EZETIMIBE 10 MG/1
10 TABLET ORAL DAILY
Qty: 90 TABLET | Refills: 2 | Status: SHIPPED | OUTPATIENT
Start: 2020-01-17 | End: 2020-11-03 | Stop reason: SDUPTHER

## 2020-01-17 RX ORDER — PERINDOPRIL ERBUMINE 4 MG/1
4 TABLET ORAL DAILY
Qty: 90 TABLET | Refills: 2 | Status: SHIPPED | OUTPATIENT
Start: 2020-01-17 | End: 2020-11-03 | Stop reason: SDUPTHER

## 2020-01-17 RX ORDER — ATORVASTATIN CALCIUM 80 MG/1
80 TABLET, FILM COATED ORAL DAILY
Qty: 90 TABLET | Refills: 2 | Status: SHIPPED | OUTPATIENT
Start: 2020-01-17 | End: 2020-11-03 | Stop reason: SDUPTHER

## 2020-01-17 NOTE — PROGRESS NOTES
Subjective:       Patient ID: Srinivasan Bernal III is a 74 y.o. male.    Chief Complaint: Annual Exam    Here for routine health maintenance.    CAD s/p 2 stents. No chest pain.  Dr Hernandez.   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   HENT: Negative for hearing loss, nosebleeds, rhinorrhea and trouble swallowing.    Eyes: Negative for discharge and visual disturbance.   Respiratory: Negative for choking, chest tightness, shortness of breath and wheezing.    Cardiovascular: Negative for chest pain and palpitations.   Gastrointestinal: Negative for abdominal pain, blood in stool, constipation, diarrhea, nausea and vomiting.   Endocrine: Positive for polyuria. Negative for polydipsia.   Genitourinary: Negative for difficulty urinating, hematuria and urgency.   Musculoskeletal: Positive for arthralgias. Negative for joint swelling and neck pain.   Skin: Negative for rash and wound.   Neurological: Negative for dizziness, syncope, weakness and headaches.   Psychiatric/Behavioral: Negative for confusion and dysphoric mood.       Objective:      Vitals:    01/17/20 0949   BP: 112/70   Pulse: 74     Physical Exam   Constitutional: He appears well-nourished.   Eyes: Conjunctivae and EOM are normal.   Neck: Trachea normal and normal range of motion. No thyromegaly present.   Cardiovascular: Normal heart sounds.   Edema negative   Pulmonary/Chest: Effort normal and breath sounds normal.   Abdominal: Soft. There is no hepatomegaly.   Musculoskeletal:   Left ankle fixed   Neurological: No cranial nerve deficit.   DTR decreased bilateral   Skin: Skin is warm, dry and intact.   Psychiatric: He has a normal mood and  affect.   Alert and Oriented    Vitals reviewed.      Vision test:  Right 20/20; left 20/25    Assessment:       1. Routine physical examination    2. Essential hypertension    3. Dyslipidemia    4. Coronary artery disease, angina presence unspecified, unspecified vessel or lesion type, unspecified whether native or transplanted heart    5. Erectile dysfunction, unspecified erectile dysfunction type        Plan:       Routine physical examination    Essential hypertension  -     perindopril erbumine (ACEON) 4 mg tablet; Take 1 tablet (4 mg total) by mouth once daily.  Dispense: 90 tablet; Refill: 2  -     Comprehensive metabolic panel; Future; Expected date: 07/15/2020    Dyslipidemia  -     ezetimibe (ZETIA) 10 mg tablet; Take 1 tablet (10 mg total) by mouth once daily.  Dispense: 90 tablet; Refill: 2  -     atorvastatin (LIPITOR) 80 MG tablet; Take 1 tablet (80 mg total) by mouth once daily.  Dispense: 90 tablet; Refill: 2  -     Lipid panel; Future; Expected date: 07/15/2020  -     Comprehensive metabolic panel; Future; Expected date: 07/15/2020    Coronary artery disease, angina presence unspecified, unspecified vessel or lesion type, unspecified whether native or transplanted heart  -     Lipid panel; Future; Expected date: 07/15/2020  -     Comprehensive metabolic panel; Future; Expected date: 07/15/2020    Erectile dysfunction, unspecified erectile dysfunction type  -     sildenafil (VIAGRA) 100 MG tablet; TK 1 T PO dialy PRN  Dispense: 6 tablet; Refill: 11            Medication List with Changes/Refills   Current Medications    ASPIRIN (ECOTRIN) 81 MG EC TABLET    Take 81 mg by mouth once daily.    CAVERJECT IMPULSE 20 MCG INJECTION    AS DIRECTED. NO MORE THAN 3 PER TIMES A WEEK    IBUPROFEN (ADVIL,MOTRIN) 200 MG TABLET    Take 200 mg by mouth every 6 (six) hours as needed for Pain.    MUPIROCIN (BACTROBAN) 2 % OINTMENT    Apply to affected area 3 times daily   Changed and/or Refilled Medications     Modified Medication Previous Medication    ATORVASTATIN (LIPITOR) 80 MG TABLET atorvastatin (LIPITOR) 80 MG tablet       Take 1 tablet (80 mg total) by mouth once daily.    Take 1 tablet (80 mg total) by mouth once daily.    EZETIMIBE (ZETIA) 10 MG TABLET ezetimibe (ZETIA) 10 mg tablet       Take 1 tablet (10 mg total) by mouth once daily.    Take 1 tablet (10 mg total) by mouth once daily.    PERINDOPRIL ERBUMINE (ACEON) 4 MG TABLET perindopril erbumine (ACEON) 4 mg tablet       Take 1 tablet (4 mg total) by mouth once daily.    Take 1 tablet (4 mg total) by mouth once daily.    SILDENAFIL (VIAGRA) 100 MG TABLET sildenafil (VIAGRA) 100 MG tablet       TK 1 T PO dialy PRN    TK 1 T PO dialy PRN     Wellness reviewed   Continue current management and monitor.    Counseled on regular exercise, maintenance of a healthy weight, balanced diet rich in fruits/vegetables and lean protein, and avoidance of unhealthy habits like smoking and excessive alcohol intake.   Also, counseled on importance of being compliant with medication, health appointments, diet and exercise.     Follow up in about 6 months (around 7/17/2020).

## 2020-02-05 ENCOUNTER — PATIENT OUTREACH (OUTPATIENT)
Dept: ADMINISTRATIVE | Facility: OTHER | Age: 75
End: 2020-02-05

## 2020-02-07 ENCOUNTER — OFFICE VISIT (OUTPATIENT)
Dept: PODIATRY | Facility: CLINIC | Age: 75
End: 2020-02-07
Payer: MEDICARE

## 2020-02-07 ENCOUNTER — HOSPITAL ENCOUNTER (OUTPATIENT)
Dept: RADIOLOGY | Facility: HOSPITAL | Age: 75
Discharge: HOME | End: 2020-02-07
Attending: PODIATRIST
Payer: MEDICARE

## 2020-02-07 VITALS
DIASTOLIC BLOOD PRESSURE: 68 MMHG | HEIGHT: 72 IN | BODY MASS INDEX: 23.16 KG/M2 | SYSTOLIC BLOOD PRESSURE: 104 MMHG | HEART RATE: 69 BPM | WEIGHT: 171 LBS

## 2020-02-07 DIAGNOSIS — M19.072 ARTHRITIS OF LEFT FOOT: ICD-10-CM

## 2020-02-07 DIAGNOSIS — Z98.1 HISTORY OF ANKLE FUSION: ICD-10-CM

## 2020-02-07 DIAGNOSIS — M19.072 ARTHRITIS OF LEFT FOOT: Primary | ICD-10-CM

## 2020-02-07 PROCEDURE — 99203 PR OFFICE/OUTPT VISIT, NEW, LEVL III, 30-44 MIN: ICD-10-PCS | Mod: S$PBB,,, | Performed by: PODIATRIST

## 2020-02-07 PROCEDURE — 99999 PR PBB SHADOW E&M-EST. PATIENT-LVL III: ICD-10-PCS | Mod: PBBFAC,,, | Performed by: PODIATRIST

## 2020-02-07 PROCEDURE — 73630 X-RAY EXAM OF FOOT: CPT | Mod: TC,PO,LT

## 2020-02-07 PROCEDURE — 99999 PR PBB SHADOW E&M-EST. PATIENT-LVL III: CPT | Mod: PBBFAC,,, | Performed by: PODIATRIST

## 2020-02-07 PROCEDURE — 99203 OFFICE O/P NEW LOW 30 MIN: CPT | Mod: S$PBB,,, | Performed by: PODIATRIST

## 2020-02-07 PROCEDURE — 73630 X-RAY EXAM OF FOOT: CPT | Mod: 26,LT,, | Performed by: RADIOLOGY

## 2020-02-07 PROCEDURE — 99213 OFFICE O/P EST LOW 20 MIN: CPT | Mod: PBBFAC,25,PN | Performed by: PODIATRIST

## 2020-02-07 PROCEDURE — 73630 XR FOOT COMPLETE 3 VIEW LEFT: ICD-10-PCS | Mod: 26,LT,, | Performed by: RADIOLOGY

## 2020-02-07 NOTE — LETTER
February 19, 2020      Ray Anna MD  [address] [facility] [address] [place] [address]           [facility]  [address] [place] [address]  Phone: [phone]          Patient: Srinivasan Bernal III   MR Number: [id number]   YOB: 1945   Date of Visit: 2/7/2020       Dear Dr. Ray Anna:    Thank you for referring Srinivasan Bernal to me for evaluation. Attached you will find relevant portions of my assessment and plan of care.    If you have questions, please do not hesitate to call me. I look forward to following Srinivasan Bernal along with you.    Sincerely,    Vinod Askew, DPJACQUELYN    Enclosure  CC:  No Recipients    If you would like to receive this communication electronically, please contact [email or web address] or [phone] to request more information on [employer] Link access.    For providers and/or their staff who would like to refer a patient to [facility], please contact us through our one-stop-shop provider referral line, [facility], at [phone].    If you feel you have received this communication in error or would no longer like to receive these types of communications, please e-mail [email or web address]

## 2020-02-07 NOTE — PATIENT INSTRUCTIONS
Cary Cedeñoi Shoe      3. Orthotic (recommend the following brands: Superfeet, Spenco, Powerstep, Sof Sole Fit Series)

## 2020-02-20 NOTE — PROGRESS NOTES
Subjective:      Patient ID: Srinivasan Bernal III is a 74 y.o. male.    Chief Complaint: Foot Pain (L foot ankle infusion November 2005 and pain since.. getting worse..  Dr Braxton 1/2020 )    Srinivasan is a 74 y.o. male who presents to the podiatry clinic  with complaint of  left foot pain. Onset of the symptoms was since his ankle fusion in 2005. Precipitating event: none known. Current symptoms include: ability to bear weight, but with some pain and worsening symptoms after a period of activity. Aggravating factors: any weight bearing. Symptoms have gradually worsened. Patient has had prior foot problems. Evaluation to date: none. Treatment to date: none. Patients rates pain 6/10 on pain scale.        Review of Systems   Constitution: Negative for chills and fever.   Cardiovascular: Negative for claudication and leg swelling.   Respiratory: Negative for shortness of breath.    Skin: Positive for nail changes. Negative for itching and rash.   Musculoskeletal: Positive for arthritis. Negative for muscle cramps, muscle weakness and myalgias.        Left ankle fusion   Gastrointestinal: Negative for nausea and vomiting.   Neurological: Negative for focal weakness, loss of balance, numbness and paresthesias.           Objective:      Physical Exam   Constitutional: He is oriented to person, place, and time. He appears well-developed and well-nourished. No distress.   Cardiovascular:   Pulses:       Dorsalis pedis pulses are 2+ on the right side, and 2+ on the left side.        Posterior tibial pulses are 2+ on the right side, and 2+ on the left side.   < 3 sec capillary refill time to toes 1-5 bilateral. Toes and feet are warm to touch proximally with normal distal cooling b/l. There is some hair growth on the feet and toes b/l. There is no edema b/l. No spider veins or varicosities present b/l.      Musculoskeletal:   Equinus noted right ankle with < 10 deg DF noted. MMT 5/5 in DF/PF/Inv/Ev resistance with no reproduction  of pain in any direction.     No motion noted in the left ankle    Distal midfoot joints pain with palpation and motion     Neurological: He is alert and oriented to person, place, and time. He has normal strength. He displays no atrophy and no tremor. No sensory deficit. He exhibits normal muscle tone.   Negative tinel sign bilateral.   Skin: Skin is warm, dry and intact. No abrasion, no bruising, no burn, no ecchymosis, no laceration, no lesion, no petechiae and no rash noted. He is not diaphoretic. No cyanosis or erythema. No pallor. Nails show no clubbing.   Skin temperature, texture and turgor within normal limits.   Psychiatric: He has a normal mood and affect. His behavior is normal.             Assessment:       Encounter Diagnoses   Name Primary?    Arthritis of left foot Yes    History of ankle fusion          Plan:       Srinivasan was seen today for foot pain.    Diagnoses and all orders for this visit:    Arthritis of left foot  -     X-Ray Foot Complete Left; Future    History of ankle fusion  -     X-Ray Foot Complete Left; Future      I counseled the patient on his conditions, their implications and medical management.  .  Patient will obtain over the counter arch supports and wear them in shoes whenever possible.  Athletic shoes intended for walking or running are usually best.    Consider Custom brace    Rule out osseous injury with x-ray    Return ZOE Askew DPM

## 2020-05-05 ENCOUNTER — PATIENT MESSAGE (OUTPATIENT)
Dept: ADMINISTRATIVE | Facility: HOSPITAL | Age: 75
End: 2020-05-05

## 2020-07-15 ENCOUNTER — LAB VISIT (OUTPATIENT)
Dept: LAB | Facility: HOSPITAL | Age: 75
End: 2020-07-15
Attending: INTERNAL MEDICINE
Payer: MEDICARE

## 2020-07-15 DIAGNOSIS — I10 ESSENTIAL HYPERTENSION: ICD-10-CM

## 2020-07-15 DIAGNOSIS — E78.5 DYSLIPIDEMIA: ICD-10-CM

## 2020-07-15 DIAGNOSIS — I25.10 CORONARY ARTERY DISEASE, ANGINA PRESENCE UNSPECIFIED, UNSPECIFIED VESSEL OR LESION TYPE, UNSPECIFIED WHETHER NATIVE OR TRANSPLANTED HEART: ICD-10-CM

## 2020-07-15 LAB
ALBUMIN SERPL BCP-MCNC: 3.9 G/DL (ref 3.5–5.2)
ALP SERPL-CCNC: 94 U/L (ref 55–135)
ALT SERPL W/O P-5'-P-CCNC: 21 U/L (ref 10–44)
ANION GAP SERPL CALC-SCNC: 6 MMOL/L (ref 8–16)
AST SERPL-CCNC: 21 U/L (ref 10–40)
BILIRUB SERPL-MCNC: 0.8 MG/DL (ref 0.1–1)
BUN SERPL-MCNC: 16 MG/DL (ref 8–23)
CALCIUM SERPL-MCNC: 9.4 MG/DL (ref 8.7–10.5)
CHLORIDE SERPL-SCNC: 107 MMOL/L (ref 95–110)
CHOLEST SERPL-MCNC: 145 MG/DL (ref 120–199)
CHOLEST/HDLC SERPL: 2.3 {RATIO} (ref 2–5)
CO2 SERPL-SCNC: 28 MMOL/L (ref 23–29)
CREAT SERPL-MCNC: 0.9 MG/DL (ref 0.5–1.4)
EST. GFR  (AFRICAN AMERICAN): >60 ML/MIN/1.73 M^2
EST. GFR  (NON AFRICAN AMERICAN): >60 ML/MIN/1.73 M^2
GLUCOSE SERPL-MCNC: 95 MG/DL (ref 70–110)
HDLC SERPL-MCNC: 64 MG/DL (ref 40–75)
HDLC SERPL: 44.1 % (ref 20–50)
LDLC SERPL CALC-MCNC: 70.8 MG/DL (ref 63–159)
NONHDLC SERPL-MCNC: 81 MG/DL
POTASSIUM SERPL-SCNC: 4.5 MMOL/L (ref 3.5–5.1)
PROT SERPL-MCNC: 6.5 G/DL (ref 6–8.4)
SODIUM SERPL-SCNC: 141 MMOL/L (ref 136–145)
TRIGL SERPL-MCNC: 51 MG/DL (ref 30–150)

## 2020-07-15 PROCEDURE — 36415 COLL VENOUS BLD VENIPUNCTURE: CPT | Mod: PO

## 2020-07-15 PROCEDURE — 80061 LIPID PANEL: CPT

## 2020-07-15 PROCEDURE — 80053 COMPREHEN METABOLIC PANEL: CPT

## 2020-07-17 DIAGNOSIS — Z71.89 COMPLEX CARE COORDINATION: ICD-10-CM

## 2020-07-22 ENCOUNTER — OFFICE VISIT (OUTPATIENT)
Dept: FAMILY MEDICINE | Facility: CLINIC | Age: 75
End: 2020-07-22
Payer: MEDICARE

## 2020-07-22 VITALS
DIASTOLIC BLOOD PRESSURE: 68 MMHG | OXYGEN SATURATION: 95 % | HEIGHT: 71 IN | TEMPERATURE: 98 F | BODY MASS INDEX: 23.67 KG/M2 | SYSTOLIC BLOOD PRESSURE: 116 MMHG | WEIGHT: 169.06 LBS | HEART RATE: 60 BPM

## 2020-07-22 DIAGNOSIS — I25.10 CORONARY ARTERY DISEASE, ANGINA PRESENCE UNSPECIFIED, UNSPECIFIED VESSEL OR LESION TYPE, UNSPECIFIED WHETHER NATIVE OR TRANSPLANTED HEART: ICD-10-CM

## 2020-07-22 DIAGNOSIS — I10 ESSENTIAL HYPERTENSION: Primary | ICD-10-CM

## 2020-07-22 DIAGNOSIS — Z71.85 VACCINE COUNSELING: ICD-10-CM

## 2020-07-22 DIAGNOSIS — Z12.5 ENCOUNTER FOR SCREENING FOR MALIGNANT NEOPLASM OF PROSTATE: ICD-10-CM

## 2020-07-22 DIAGNOSIS — E78.5 DYSLIPIDEMIA: ICD-10-CM

## 2020-07-22 DIAGNOSIS — Z79.899 ENCOUNTER FOR LONG-TERM (CURRENT) USE OF MEDICATIONS: ICD-10-CM

## 2020-07-22 PROCEDURE — 99214 OFFICE O/P EST MOD 30 MIN: CPT | Mod: S$PBB,,, | Performed by: INTERNAL MEDICINE

## 2020-07-22 PROCEDURE — G0009 ADMIN PNEUMOCOCCAL VACCINE: HCPCS | Mod: PBBFAC,PO

## 2020-07-22 PROCEDURE — 99999 PR PBB SHADOW E&M-EST. PATIENT-LVL III: ICD-10-PCS | Mod: PBBFAC,,, | Performed by: INTERNAL MEDICINE

## 2020-07-22 PROCEDURE — 99213 OFFICE O/P EST LOW 20 MIN: CPT | Mod: PBBFAC,PO,25 | Performed by: INTERNAL MEDICINE

## 2020-07-22 PROCEDURE — 99999 PR PBB SHADOW E&M-EST. PATIENT-LVL III: CPT | Mod: PBBFAC,,, | Performed by: INTERNAL MEDICINE

## 2020-07-22 PROCEDURE — 99214 PR OFFICE/OUTPT VISIT, EST, LEVL IV, 30-39 MIN: ICD-10-PCS | Mod: S$PBB,,, | Performed by: INTERNAL MEDICINE

## 2020-07-22 NOTE — PROGRESS NOTES
Subjective:       Patient ID: Srinivasan Bernal III is a 75 y.o. male.    Chief Complaint: Hypertension      CAD s/p 2 stents. No chest pain.  Dr Hernandez.   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   HENT: Negative for hearing loss, nosebleeds, rhinorrhea and trouble swallowing.    Eyes: Negative for discharge and visual disturbance.   Respiratory: Negative for choking, chest tightness, shortness of breath and wheezing.    Cardiovascular: Negative for chest pain and palpitations.   Gastrointestinal: Positive for constipation. Negative for abdominal pain, blood in stool, diarrhea, nausea and vomiting.   Endocrine: Negative for polydipsia and polyuria.   Genitourinary: Negative for difficulty urinating, hematuria and urgency.   Musculoskeletal: Positive for arthralgias. Negative for joint swelling and neck pain.   Skin: Negative for rash and wound.   Neurological: Negative for dizziness, syncope, weakness and headaches.   Psychiatric/Behavioral: Negative for confusion and dysphoric mood.       Objective:      Vitals:    07/22/20 1238   BP: 116/68     Physical Exam  Vitals signs reviewed.   Eyes:      Conjunctiva/sclera: Conjunctivae normal.   Neck:      Musculoskeletal: Normal range of motion.   Cardiovascular:      Rate and Rhythm: Normal rate and regular rhythm.   Pulmonary:      Effort: Pulmonary effort is normal.      Breath sounds: Normal breath sounds.   Musculoskeletal:      Comments: Normal ROM bilateral    Skin:     General: Skin is warm and dry.   Neurological:      Cranial Nerves: No cranial nerve deficit (grossly intact).   Psychiatric:      Comments: Alert and orientated           Assessment:        1. Essential hypertension    2. Dyslipidemia    3. Coronary artery disease, angina presence unspecified, unspecified vessel or lesion type, unspecified whether native or transplanted heart    4. Vaccine counseling    5. Encounter for long-term (current) use of medications    6. Encounter for screening for malignant neoplasm of prostate        Plan:       Essential hypertension  -     Comprehensive metabolic panel; Future; Expected date: 01/18/2021    Dyslipidemia  -     Lipid Panel; Future; Expected date: 01/18/2021    Coronary artery disease, angina presence unspecified, unspecified vessel or lesion type, unspecified whether native or transplanted heart    Vaccine counseling  -     Pneumococcal Polysaccharide Vaccine (23 Valent) (SQ/IM)    Encounter for long-term (current) use of medications  -     CBC auto differential; Future; Expected date: 01/18/2021    Encounter for screening for malignant neoplasm of prostate  -     PSA, Screening; Future; Expected date: 01/18/2021            Medication List with Changes/Refills   Current Medications    ASPIRIN (ECOTRIN) 81 MG EC TABLET    Take 81 mg by mouth once daily.    ATORVASTATIN (LIPITOR) 80 MG TABLET    Take 1 tablet (80 mg total) by mouth once daily.    CAVERJECT IMPULSE 20 MCG INJECTION    AS DIRECTED. NO MORE THAN 3 PER TIMES A WEEK    EZETIMIBE (ZETIA) 10 MG TABLET    Take 1 tablet (10 mg total) by mouth once daily.    IBUPROFEN (ADVIL,MOTRIN) 200 MG TABLET    Take 200 mg by mouth every 6 (six) hours as needed for Pain.    MUPIROCIN (BACTROBAN) 2 % OINTMENT    Apply to affected area 3 times daily    PERINDOPRIL ERBUMINE (ACEON) 4 MG TABLET    Take 1 tablet (4 mg total) by mouth once daily.    SILDENAFIL (VIAGRA) 100 MG TABLET    TK 1 T PO dialy PRN       Continue current management and monitor.    Counseled on regular exercise, maintenance of a healthy weight, balanced diet rich in fruits/vegetables and lean protein, and avoidance of unhealthy habits like  smoking and excessive alcohol intake.   Also, counseled on importance of being compliant with medication, health appointments, diet and exercise.     Follow up in about 6 months (around 1/22/2021).

## 2020-07-22 NOTE — PATIENT INSTRUCTIONS
Use Melatonin to help you sleep.  Start with 5 mg 1 hr before bed and 5 mg as you turn off the light.  You may increase the 1 hr before dose to 10 mg, 20 mg, or up to 50 mg.  Keep the 5 - 10 mg as you turn off the light.

## 2020-11-03 DIAGNOSIS — I10 ESSENTIAL HYPERTENSION: ICD-10-CM

## 2020-11-03 DIAGNOSIS — E78.5 DYSLIPIDEMIA: ICD-10-CM

## 2020-11-03 NOTE — TELEPHONE ENCOUNTER
No new care gaps identified.  Powered by [employer]. Reference number: [id number]. 11/03/2020 2:27:22 PM   CST

## 2020-11-03 NOTE — TELEPHONE ENCOUNTER
----- Message from Risa Saldaña sent at 11/3/2020  2:15 PM CST -----  Regarding: refill  Contact: patient  Type:  RX Refill Request    Who Called:  patient  Refill or New Rx:  refill  RX Name and Strength:    1)ezetimibe (ZETIA) 10 mg tablet  2)perindopril erbumine (ACEON) 4 mg tablet  3)atorvastatin (LIPITOR) 80 MG tablet  How is the patient currently taking it? (ex. 1XDay):  /  Is this a 30 day or 90 day RX:  90  Preferred Pharmacy with phone number:    [id number] [place] [id number] AT [facility] [address] [address]  Phone: [phone] Fax: [phone]  Local or Mail Order:  local  Ordering Provider:  grace Sotomayor Call Back Number:  na  Additional Information:  denys

## 2020-11-04 RX ORDER — PERINDOPRIL ERBUMINE 4 MG/1
4 TABLET ORAL DAILY
Qty: 90 TABLET | Refills: 2 | Status: SHIPPED | OUTPATIENT
Start: 2020-11-04 | End: 2021-06-30 | Stop reason: SDUPTHER

## 2020-11-04 RX ORDER — EZETIMIBE 10 MG/1
10 TABLET ORAL DAILY
Qty: 90 TABLET | Refills: 2 | Status: SHIPPED | OUTPATIENT
Start: 2020-11-04 | End: 2021-01-20

## 2020-11-04 RX ORDER — ATORVASTATIN CALCIUM 80 MG/1
80 TABLET, FILM COATED ORAL DAILY
Qty: 90 TABLET | Refills: 2 | Status: SHIPPED | OUTPATIENT
Start: 2020-11-04 | End: 2021-06-30 | Stop reason: SDUPTHER

## 2020-11-04 NOTE — PROGRESS NOTES
Refill Authorization Note   Srinivasan Bernal  is requesting a refill authorization.  Brief assessment and rationale for refill: Approve  Medication Therapy Plan: CDMR.       Medication reconciliation completed: No    Comments:   Orders Placed This Encounter    ezetimibe (ZETIA) 10 mg tablet    perindopril erbumine (ACEON) 4 mg tablet    atorvastatin (LIPITOR) 80 MG tablet      Requested Prescriptions   Signed Prescriptions Disp Refills    ezetimibe (ZETIA) 10 mg tablet 90 tablet 2     Sig: Take 1 tablet (10 mg total) by mouth once daily.       Cardiovascular:  Antilipid - Sterol Transport Inhibitors Passed - 11/3/2020  2:27 PM        Passed - Patient is at least 18 years old        Passed - Office visit in past 12 months or future 90 days     Recent Outpatient Visits            3 months ago Essential hypertension    [facility]    9 months ago Arthritis of left foot    [facility] Vinod Askew DPM    9 months ago Routine physical examination    [facility]    1 year ago Essential hypertension    [facility]    1 year ago Essential hypertension    [facility]          Future Appointments              In 2 months LAB, [place] [facility] [place]    In 2 months Ray Anna MD [facility]                Passed - Total Cholesterol within 360 days     Cholesterol   Date Value Ref Range Status   07/15/2020 145 120 - 199 mg/dL Final     Comment:     The National Cholesterol Education Program (NCEP) has set the  following guidelines (reference ranges) for Cholesterol:  Optimal.....................<200 mg/dL  Borderline High.............200-239 mg/dL  High........................> or = 240 mg/dL     01/13/2020 139 120 - 199 mg/dL Final     Comment:     The National Cholesterol Education Program (NCEP) has set the  following  guidelines (reference ranges) for Cholesterol:  Optimal.....................<200 mg/dL  Borderline High.............200-239 mg/dL  High........................> or = 240 mg/dL     07/10/2019 160 120 - 199 mg/dL Final     Comment:     The National Cholesterol Education Program (NCEP) has set the  following guidelines (reference ranges) for Cholesterol:  Optimal.....................<200 mg/dL  Borderline High.............200-239 mg/dL  High........................> or = 240 mg/dL                Passed - LDL within 360 days     LDL Cholesterol   Date Value Ref Range Status   07/15/2020 70.8 63.0 - 159.0 mg/dL Final     Comment:     The National Cholesterol Education Program (NCEP) has set the  following guidelines (reference values) for LDL Cholesterol:  Optimal.......................<130 mg/dL  Borderline High...............130-159 mg/dL  High..........................160-189 mg/dL  Very High.....................>190 mg/dL              Passed - HDL within 360 days     HDL   Date Value Ref Range Status   07/15/2020 64 40 - 75 mg/dL Final     Comment:     The National Cholesterol Education Program (NCEP) has set the  following guidelines (reference values) for HDL Cholesterol:  Low...............<40 mg/dL  Optimal...........>60 mg/dL              Passed - Triglycerides within 360 days     Triglycerides   Date Value Ref Range Status   07/15/2020 51 30 - 150 mg/dL Final     Comment:     The National Cholesterol Education Program (NCEP) has set the  following guidelines (reference values) for triglycerides:  Normal......................<150 mg/dL  Borderline High.............150-199 mg/dL  High........................200-499 mg/dL     01/13/2020 43 30 - 150 mg/dL Final     Comment:     The National Cholesterol Education Program (NCEP) has set the  following guidelines (reference values) for triglycerides:  Normal......................<150 mg/dL  Borderline High.............150-199 mg/dL  High........................200-499  mg/dL     07/10/2019 59 30 - 150 mg/dL Final     Comment:     The National Cholesterol Education Program (NCEP) has set the  following guidelines (reference values) for triglycerides:  Normal......................<150 mg/dL  Borderline High.............150-199 mg/dL  High........................200-499 mg/dL                Passed - AST is between 0 and 54 and within 360 days     AST   Date Value Ref Range Status   07/15/2020 21 10 - 40 U/L Final   01/13/2020 22 10 - 40 U/L Final   07/10/2019 18 10 - 40 U/L Final              Passed - ALT is between 0 and 94 and within 360 days     ALT   Date Value Ref Range Status   07/15/2020 21 10 - 44 U/L Final   01/13/2020 25 10 - 44 U/L Final   07/10/2019 16 10 - 44 U/L Final                perindopril erbumine (ACEON) 4 mg tablet 90 tablet 2     Sig: Take 1 tablet (4 mg total) by mouth once daily.       Cardiovascular:  ACE Inhibitors Passed - 11/3/2020  2:27 PM        Passed - Patient is at least 18 years old        Passed - Last BP in normal range within 360 days.     BP Readings from Last 3 Encounters:   07/22/20 116/68   02/07/20 104/68   01/17/20 112/70              Passed - Office visit in past 12 months or future 90 days     Recent Outpatient Visits            3 months ago [facility]    9 months ago Arthritis of left foot    [facility] Vinod Askew DPM    9 months ago [facility]ine physical examination    [facility]    1 year ago Essential hypertension    [facility]    1 year ago Essential hypertension    [facility]          Future Appointments              In 2 months LAB, [place] [facility] [place]    In 2 months Ray Anna MD [facility]                Passed - Cr is 1.4 or below and within 360 days     Creatinine   Date Value Ref Range  Status   07/15/2020 0.9 0.5 - 1.4 mg/dL Final   01/13/2020 0.9 0.5 - 1.4 mg/dL Final   07/10/2019 0.9 0.5 - 1.4 mg/dL Final              Passed - K in normal range and within 360 days     Potassium   Date Value Ref Range Status   07/15/2020 4.5 3.5 - 5.1 mmol/L Final   01/13/2020 4.5 3.5 - 5.1 mmol/L Final   07/10/2019 4.4 3.5 - 5.1 mmol/L Final              Passed - eGFR within 360 days     eGFR if non    Date Value Ref Range Status   07/15/2020 >60.0 >60 mL/min/1.73 m^2 Final     Comment:     Calculation used to obtain the estimated glomerular filtration  rate (eGFR) is the CKD-EPI equation.      01/13/2020 >60.0 >60 mL/min/1.73 m^2 Final     Comment:     Calculation used to obtain the estimated glomerular filtration  rate (eGFR) is the CKD-EPI equation.      07/10/2019 >60.0 >60 mL/min/1.73 m^2 Final     Comment:     Calculation used to obtain the estimated glomerular filtration  rate (eGFR) is the CKD-EPI equation.        eGFR if    Date Value Ref Range Status   07/15/2020 >60.0 >60 mL/min/1.73 m^2 Final   01/13/2020 >60.0 >60 mL/min/1.73 m^2 Final   07/10/2019 >60.0 >60 mL/min/1.73 m^2 Final                atorvastatin (LIPITOR) 80 MG tablet 90 tablet 2     Sig: Take 1 tablet (80 mg total) by mouth once daily.       Cardiovascular:  Antilipid - Statins Passed - 11/3/2020  2:27 PM        Passed - Patient is at least 18 years old        Passed - Office visit in past 12 months or future 90 days     Recent Outpatient Visits            3 months ago [facility]    9 months ago Arthritis of left foot    [facility] Vinod Askew DPM    9 months ago Routine physical examination    [facility]    1 year ago Essential hypertension    [facility]    1 year ago [facility]          Future  Appointments              In 2 months LAB, [place] [facility]    In 2 months Ray Anna MD [facility]                Passed - ALT is 94 or below and within 360 days     ALT   Date Value Ref Range Status   07/15/2020 21 10 - 44 U/L Final   01/13/2020 25 10 - 44 U/L Final   07/10/2019 16 10 - 44 U/L Final              Passed - AST is 54 or below and within 360 days     AST   Date Value Ref Range Status   07/15/2020 21 10 - 40 U/L Final   01/13/2020 22 10 - 40 U/L Final   07/10/2019 18 10 - 40 U/L Final              Passed - Total Cholesterol within 360 days     Cholesterol   Date Value Ref Range Status   07/15/2020 145 120 - 199 mg/dL Final     Comment:     The National Cholesterol Education Program (NCEP) has set the  following guidelines (reference ranges) for Cholesterol:  Optimal.....................<200 mg/dL  Borderline High.............200-239 mg/dL  High........................> or = 240 mg/dL     01/13/2020 139 120 - 199 mg/dL Final     Comment:     The National Cholesterol Education Program (NCEP) has set the  following guidelines (reference ranges) for Cholesterol:  Optimal.....................<200 mg/dL  Borderline High.............200-239 mg/dL  High........................> or = 240 mg/dL     07/10/2019 160 120 - 199 mg/dL Final     Comment:     The National Cholesterol Education Program (NCEP) has set the  following guidelines (reference ranges) for Cholesterol:  Optimal.....................<200 mg/dL  Borderline High.............200-239 mg/dL  High........................> or = 240 mg/dL                Passed - LDL within 360 days     LDL Cholesterol   Date Value Ref Range Status   07/15/2020 70.8 63.0 - 159.0 mg/dL Final     Comment:     The National Cholesterol Education Program (NCEP) has set the  following guidelines (reference values) for LDL Cholesterol:  Optimal.......................<130 mg/dL  Borderline  High...............130-159 mg/dL  High..........................160-189 mg/dL  Very High.....................>190 mg/dL              Passed - HDL within 360 days     HDL   Date Value Ref Range Status   07/15/2020 64 40 - 75 mg/dL Final     Comment:     The National Cholesterol Education Program (NCEP) has set the  following guidelines (reference values) for HDL Cholesterol:  Low...............<40 mg/dL  Optimal...........>60 mg/dL              Passed - Triglycerides within 360 days     Triglycerides   Date Value Ref Range Status   07/15/2020 51 30 - 150 mg/dL Final     Comment:     The National Cholesterol Education Program (NCEP) has set the  following guidelines (reference values) for triglycerides:  Normal......................<150 mg/dL  Borderline High.............150-199 mg/dL  High........................200-499 mg/dL     01/13/2020 43 30 - 150 mg/dL Final     Comment:     The National Cholesterol Education Program (NCEP) has set the  following guidelines (reference values) for triglycerides:  Normal......................<150 mg/dL  Borderline High.............150-199 mg/dL  High........................200-499 mg/dL     07/10/2019 59 30 - 150 mg/dL Final     Comment:     The National Cholesterol Education Program (NCEP) has set the  following guidelines (reference values) for triglycerides:  Normal......................<150 mg/dL  Borderline High.............150-199 mg/dL  High........................200-499 mg/dL                    Appointments  past 12m or future 3m with PCP    Date Provider   Last Visit   7/22/2020 Ray Anna MD   Next Visit   1/20/2021 Ray Anna MD   ED visits in past 90 days: 0     Note composed:10:14 AM 11/04/2020

## 2020-11-24 ENCOUNTER — TELEPHONE (OUTPATIENT)
Dept: FAMILY MEDICINE | Facility: CLINIC | Age: 75
End: 2020-11-24

## 2020-11-24 NOTE — TELEPHONE ENCOUNTER
----- Message from Jerry Milian sent at 11/24/2020 12:25 PM CST -----  Regarding: Orders for Covid testing  Contact: patient  Patient called in and stated he was exposed to someone with positive Covid and has self quarantined for past 10 days.  Patient is not having symptoms but would like to get tested to make sure.    Patient call back number is [phone]

## 2021-01-04 ENCOUNTER — PATIENT MESSAGE (OUTPATIENT)
Dept: FAMILY MEDICINE | Facility: CLINIC | Age: 76
End: 2021-01-04

## 2021-01-04 ENCOUNTER — PATIENT OUTREACH (OUTPATIENT)
Dept: ADMINISTRATIVE | Facility: HOSPITAL | Age: 76
End: 2021-01-04

## 2021-01-05 ENCOUNTER — IMMUNIZATION (OUTPATIENT)
Dept: INTERNAL MEDICINE | Facility: CLINIC | Age: 76
End: 2021-01-05
Payer: MEDICARE

## 2021-01-05 DIAGNOSIS — Z23 NEED FOR VACCINATION: ICD-10-CM

## 2021-01-05 PROCEDURE — 91300 COVID-19, MRNA, LNP-S, PF, 30 MCG/0.3 ML DOSE VACCINE: CPT | Mod: PBBFAC | Performed by: FAMILY MEDICINE

## 2021-01-13 ENCOUNTER — LAB VISIT (OUTPATIENT)
Dept: LAB | Facility: HOSPITAL | Age: 76
End: 2021-01-13
Attending: INTERNAL MEDICINE
Payer: MEDICARE

## 2021-01-13 DIAGNOSIS — Z12.5 ENCOUNTER FOR SCREENING FOR MALIGNANT NEOPLASM OF PROSTATE: ICD-10-CM

## 2021-01-13 DIAGNOSIS — I10 ESSENTIAL HYPERTENSION: ICD-10-CM

## 2021-01-13 DIAGNOSIS — E78.5 DYSLIPIDEMIA: ICD-10-CM

## 2021-01-13 DIAGNOSIS — Z79.899 ENCOUNTER FOR LONG-TERM (CURRENT) USE OF MEDICATIONS: ICD-10-CM

## 2021-01-13 LAB
ALBUMIN SERPL BCP-MCNC: 3.9 G/DL (ref 3.5–5.2)
ALP SERPL-CCNC: 91 U/L (ref 55–135)
ALT SERPL W/O P-5'-P-CCNC: 31 U/L (ref 10–44)
ANION GAP SERPL CALC-SCNC: 8 MMOL/L (ref 8–16)
AST SERPL-CCNC: 28 U/L (ref 10–40)
BASOPHILS # BLD AUTO: 0.04 K/UL (ref 0–0.2)
BASOPHILS NFR BLD: 0.8 % (ref 0–1.9)
BILIRUB SERPL-MCNC: 1.1 MG/DL (ref 0.1–1)
BUN SERPL-MCNC: 18 MG/DL (ref 8–23)
CALCIUM SERPL-MCNC: 9.3 MG/DL (ref 8.7–10.5)
CHLORIDE SERPL-SCNC: 107 MMOL/L (ref 95–110)
CHOLEST SERPL-MCNC: 143 MG/DL (ref 120–199)
CHOLEST/HDLC SERPL: 2.3 {RATIO} (ref 2–5)
CO2 SERPL-SCNC: 28 MMOL/L (ref 23–29)
COMPLEXED PSA SERPL-MCNC: 0.7 NG/ML (ref 0–4)
CREAT SERPL-MCNC: 0.9 MG/DL (ref 0.5–1.4)
DIFFERENTIAL METHOD: ABNORMAL
EOSINOPHIL # BLD AUTO: 0.1 K/UL (ref 0–0.5)
EOSINOPHIL NFR BLD: 2.2 % (ref 0–8)
ERYTHROCYTE [DISTWIDTH] IN BLOOD BY AUTOMATED COUNT: 12.6 % (ref 11.5–14.5)
EST. GFR  (AFRICAN AMERICAN): >60 ML/MIN/1.73 M^2
EST. GFR  (NON AFRICAN AMERICAN): >60 ML/MIN/1.73 M^2
GLUCOSE SERPL-MCNC: 89 MG/DL (ref 70–110)
HCT VFR BLD AUTO: 45.9 % (ref 40–54)
HDLC SERPL-MCNC: 61 MG/DL (ref 40–75)
HDLC SERPL: 42.7 % (ref 20–50)
HGB BLD-MCNC: 14.5 G/DL (ref 14–18)
IMM GRANULOCYTES # BLD AUTO: 0.01 K/UL (ref 0–0.04)
IMM GRANULOCYTES NFR BLD AUTO: 0.2 % (ref 0–0.5)
LDLC SERPL CALC-MCNC: 70 MG/DL (ref 63–159)
LYMPHOCYTES # BLD AUTO: 1.2 K/UL (ref 1–4.8)
LYMPHOCYTES NFR BLD: 22.9 % (ref 18–48)
MCH RBC QN AUTO: 30.7 PG (ref 27–31)
MCHC RBC AUTO-ENTMCNC: 31.6 G/DL (ref 32–36)
MCV RBC AUTO: 97 FL (ref 82–98)
MONOCYTES # BLD AUTO: 0.5 K/UL (ref 0.3–1)
MONOCYTES NFR BLD: 8.9 % (ref 4–15)
NEUTROPHILS # BLD AUTO: 3.3 K/UL (ref 1.8–7.7)
NEUTROPHILS NFR BLD: 65 % (ref 38–73)
NONHDLC SERPL-MCNC: 82 MG/DL
NRBC BLD-RTO: 0 /100 WBC
PLATELET # BLD AUTO: 213 K/UL (ref 150–350)
PMV BLD AUTO: 9.5 FL (ref 9.2–12.9)
POTASSIUM SERPL-SCNC: 4.4 MMOL/L (ref 3.5–5.1)
PROT SERPL-MCNC: 6.6 G/DL (ref 6–8.4)
RBC # BLD AUTO: 4.73 M/UL (ref 4.6–6.2)
SODIUM SERPL-SCNC: 143 MMOL/L (ref 136–145)
TRIGL SERPL-MCNC: 60 MG/DL (ref 30–150)
WBC # BLD AUTO: 5.06 K/UL (ref 3.9–12.7)

## 2021-01-13 PROCEDURE — 84153 ASSAY OF PSA TOTAL: CPT

## 2021-01-13 PROCEDURE — 80061 LIPID PANEL: CPT

## 2021-01-13 PROCEDURE — 80053 COMPREHEN METABOLIC PANEL: CPT

## 2021-01-13 PROCEDURE — 36415 COLL VENOUS BLD VENIPUNCTURE: CPT | Mod: PO

## 2021-01-13 PROCEDURE — 85025 COMPLETE CBC W/AUTO DIFF WBC: CPT

## 2021-01-20 ENCOUNTER — OFFICE VISIT (OUTPATIENT)
Dept: FAMILY MEDICINE | Facility: CLINIC | Age: 76
End: 2021-01-20
Payer: MEDICARE

## 2021-01-20 VITALS
WEIGHT: 165.38 LBS | HEART RATE: 74 BPM | SYSTOLIC BLOOD PRESSURE: 110 MMHG | OXYGEN SATURATION: 96 % | HEIGHT: 72 IN | BODY MASS INDEX: 22.4 KG/M2 | DIASTOLIC BLOOD PRESSURE: 64 MMHG

## 2021-01-20 DIAGNOSIS — I10 ESSENTIAL HYPERTENSION: ICD-10-CM

## 2021-01-20 DIAGNOSIS — E78.5 DYSLIPIDEMIA: ICD-10-CM

## 2021-01-20 DIAGNOSIS — Z00.00 ROUTINE PHYSICAL EXAMINATION: Primary | ICD-10-CM

## 2021-01-20 PROCEDURE — 3288F PR FALLS RISK ASSESSMENT DOCUMENTED: ICD-10-PCS | Mod: CPTII,S$GLB,, | Performed by: INTERNAL MEDICINE

## 2021-01-20 PROCEDURE — 3288F FALL RISK ASSESSMENT DOCD: CPT | Mod: CPTII,S$GLB,, | Performed by: INTERNAL MEDICINE

## 2021-01-20 PROCEDURE — 1125F AMNT PAIN NOTED PAIN PRSNT: CPT | Mod: S$GLB,,, | Performed by: INTERNAL MEDICINE

## 2021-01-20 PROCEDURE — 3078F PR MOST RECENT DIASTOLIC BLOOD PRESSURE < 80 MM HG: ICD-10-PCS | Mod: CPTII,S$GLB,, | Performed by: INTERNAL MEDICINE

## 2021-01-20 PROCEDURE — 99397 PR PREVENTIVE VISIT,EST,65 & OVER: ICD-10-PCS | Mod: S$GLB,,, | Performed by: INTERNAL MEDICINE

## 2021-01-20 PROCEDURE — 3074F PR MOST RECENT SYSTOLIC BLOOD PRESSURE < 130 MM HG: ICD-10-PCS | Mod: CPTII,S$GLB,, | Performed by: INTERNAL MEDICINE

## 2021-01-20 PROCEDURE — 99999 PR PBB SHADOW E&M-EST. PATIENT-LVL IV: ICD-10-PCS | Mod: PBBFAC,,, | Performed by: INTERNAL MEDICINE

## 2021-01-20 PROCEDURE — 1125F PR PAIN SEVERITY QUANTIFIED, PAIN PRESENT: ICD-10-PCS | Mod: S$GLB,,, | Performed by: INTERNAL MEDICINE

## 2021-01-20 PROCEDURE — 1101F PT FALLS ASSESS-DOCD LE1/YR: CPT | Mod: CPTII,S$GLB,, | Performed by: INTERNAL MEDICINE

## 2021-01-20 PROCEDURE — 1101F PR PT FALLS ASSESS DOC 0-1 FALLS W/OUT INJ PAST YR: ICD-10-PCS | Mod: CPTII,S$GLB,, | Performed by: INTERNAL MEDICINE

## 2021-01-20 PROCEDURE — 3078F DIAST BP <80 MM HG: CPT | Mod: CPTII,S$GLB,, | Performed by: INTERNAL MEDICINE

## 2021-01-20 PROCEDURE — 99999 PR PBB SHADOW E&M-EST. PATIENT-LVL IV: CPT | Mod: PBBFAC,,, | Performed by: INTERNAL MEDICINE

## 2021-01-20 PROCEDURE — 3074F SYST BP LT 130 MM HG: CPT | Mod: CPTII,S$GLB,, | Performed by: INTERNAL MEDICINE

## 2021-01-20 PROCEDURE — 99397 PER PM REEVAL EST PAT 65+ YR: CPT | Mod: S$GLB,,, | Performed by: INTERNAL MEDICINE

## 2021-01-27 ENCOUNTER — IMMUNIZATION (OUTPATIENT)
Dept: INTERNAL MEDICINE | Facility: CLINIC | Age: 76
End: 2021-01-27
Payer: MEDICARE

## 2021-01-27 DIAGNOSIS — Z23 NEED FOR VACCINATION: Primary | ICD-10-CM

## 2021-01-27 PROCEDURE — 91300 COVID-19, MRNA, LNP-S, PF, 30 MCG/0.3 ML DOSE VACCINE: CPT | Mod: PBBFAC | Performed by: FAMILY MEDICINE

## 2021-01-27 PROCEDURE — 0002A COVID-19, MRNA, LNP-S, PF, 30 MCG/0.3 ML DOSE VACCINE: CPT | Mod: PBBFAC | Performed by: FAMILY MEDICINE

## 2021-04-30 ENCOUNTER — EXTERNAL CHRONIC CARE MANAGEMENT (OUTPATIENT)
Dept: PRIMARY CARE CLINIC | Facility: CLINIC | Age: 76
End: 2021-04-30
Payer: MEDICARE

## 2021-04-30 PROCEDURE — 99490 CHRNC CARE MGMT STAFF 1ST 20: CPT | Mod: S$GLB,,, | Performed by: INTERNAL MEDICINE

## 2021-04-30 PROCEDURE — 99490 PR CHRONIC CARE MGMT, 1ST 20 MIN: ICD-10-PCS | Mod: S$GLB,,, | Performed by: INTERNAL MEDICINE

## 2021-06-30 DIAGNOSIS — E78.5 DYSLIPIDEMIA: ICD-10-CM

## 2021-06-30 DIAGNOSIS — I10 ESSENTIAL HYPERTENSION: ICD-10-CM

## 2021-07-01 RX ORDER — ATORVASTATIN CALCIUM 80 MG/1
80 TABLET, FILM COATED ORAL DAILY
Qty: 90 TABLET | Refills: 1 | Status: SHIPPED | OUTPATIENT
Start: 2021-07-01 | End: 2021-08-19

## 2021-07-01 RX ORDER — PERINDOPRIL ERBUMINE 4 MG/1
4 TABLET ORAL DAILY
Qty: 90 TABLET | Refills: 1 | Status: SHIPPED | OUTPATIENT
Start: 2021-07-01 | End: 2021-07-20

## 2021-07-02 ENCOUNTER — TELEPHONE (OUTPATIENT)
Dept: FAMILY MEDICINE | Facility: CLINIC | Age: 76
End: 2021-07-02

## 2021-07-09 ENCOUNTER — OFFICE VISIT (OUTPATIENT)
Dept: UROLOGY | Facility: CLINIC | Age: 76
End: 2021-07-09
Payer: MEDICARE

## 2021-07-09 VITALS — WEIGHT: 165.38 LBS | BODY MASS INDEX: 22.4 KG/M2 | HEIGHT: 72 IN

## 2021-07-09 DIAGNOSIS — N52.9 IMPOTENCE: Primary | ICD-10-CM

## 2021-07-09 DIAGNOSIS — R39.12 WEAK URINE STREAM: ICD-10-CM

## 2021-07-09 DIAGNOSIS — N13.8 ENLARGED PROSTATE WITH URINARY OBSTRUCTION: ICD-10-CM

## 2021-07-09 DIAGNOSIS — R35.0 INCREASED URINARY FREQUENCY: ICD-10-CM

## 2021-07-09 DIAGNOSIS — N40.1 ENLARGED PROSTATE WITH URINARY OBSTRUCTION: ICD-10-CM

## 2021-07-09 PROCEDURE — 1126F PR PAIN SEVERITY QUANTIFIED, NO PAIN PRESENT: ICD-10-PCS | Mod: S$GLB,,, | Performed by: UROLOGY

## 2021-07-09 PROCEDURE — 3288F PR FALLS RISK ASSESSMENT DOCUMENTED: ICD-10-PCS | Mod: CPTII,S$GLB,, | Performed by: UROLOGY

## 2021-07-09 PROCEDURE — 1101F PR PT FALLS ASSESS DOC 0-1 FALLS W/OUT INJ PAST YR: ICD-10-PCS | Mod: CPTII,S$GLB,, | Performed by: UROLOGY

## 2021-07-09 PROCEDURE — 1126F AMNT PAIN NOTED NONE PRSNT: CPT | Mod: S$GLB,,, | Performed by: UROLOGY

## 2021-07-09 PROCEDURE — 1159F PR MEDICATION LIST DOCUMENTED IN MEDICAL RECORD: ICD-10-PCS | Mod: S$GLB,,, | Performed by: UROLOGY

## 2021-07-09 PROCEDURE — 99999 PR PBB SHADOW E&M-EST. PATIENT-LVL III: CPT | Mod: PBBFAC,,, | Performed by: UROLOGY

## 2021-07-09 PROCEDURE — 99999 PR PBB SHADOW E&M-EST. PATIENT-LVL III: ICD-10-PCS | Mod: PBBFAC,,, | Performed by: UROLOGY

## 2021-07-09 PROCEDURE — 1101F PT FALLS ASSESS-DOCD LE1/YR: CPT | Mod: CPTII,S$GLB,, | Performed by: UROLOGY

## 2021-07-09 PROCEDURE — 99204 OFFICE O/P NEW MOD 45 MIN: CPT | Mod: S$GLB,,, | Performed by: UROLOGY

## 2021-07-09 PROCEDURE — 1159F MED LIST DOCD IN RCRD: CPT | Mod: S$GLB,,, | Performed by: UROLOGY

## 2021-07-09 PROCEDURE — 3288F FALL RISK ASSESSMENT DOCD: CPT | Mod: CPTII,S$GLB,, | Performed by: UROLOGY

## 2021-07-09 PROCEDURE — 99204 PR OFFICE/OUTPT VISIT, NEW, LEVL IV, 45-59 MIN: ICD-10-PCS | Mod: S$GLB,,, | Performed by: UROLOGY

## 2021-07-09 RX ORDER — TADALAFIL 20 MG/1
20 TABLET ORAL DAILY
Qty: 10 TABLET | Refills: 11 | Status: SHIPPED | OUTPATIENT
Start: 2021-07-09 | End: 2021-07-09 | Stop reason: SDUPTHER

## 2021-07-09 RX ORDER — TADALAFIL 20 MG/1
20 TABLET ORAL DAILY
Qty: 30 TABLET | Refills: 11 | Status: SHIPPED | OUTPATIENT
Start: 2021-07-09 | End: 2024-02-19

## 2021-07-13 ENCOUNTER — LAB VISIT (OUTPATIENT)
Dept: LAB | Facility: HOSPITAL | Age: 76
End: 2021-07-13
Attending: INTERNAL MEDICINE
Payer: MEDICARE

## 2021-07-13 DIAGNOSIS — I10 ESSENTIAL HYPERTENSION: ICD-10-CM

## 2021-07-13 DIAGNOSIS — E78.5 DYSLIPIDEMIA: ICD-10-CM

## 2021-07-13 LAB
ALBUMIN SERPL BCP-MCNC: 3.6 G/DL (ref 3.5–5.2)
ALP SERPL-CCNC: 90 U/L (ref 55–135)
ALT SERPL W/O P-5'-P-CCNC: 15 U/L (ref 10–44)
ANION GAP SERPL CALC-SCNC: 6 MMOL/L (ref 8–16)
AST SERPL-CCNC: 19 U/L (ref 10–40)
BILIRUB SERPL-MCNC: 0.9 MG/DL (ref 0.1–1)
BUN SERPL-MCNC: 17 MG/DL (ref 8–23)
CALCIUM SERPL-MCNC: 9.3 MG/DL (ref 8.7–10.5)
CHLORIDE SERPL-SCNC: 107 MMOL/L (ref 95–110)
CHOLEST SERPL-MCNC: 152 MG/DL (ref 120–199)
CHOLEST/HDLC SERPL: 2.4 {RATIO} (ref 2–5)
CO2 SERPL-SCNC: 26 MMOL/L (ref 23–29)
CREAT SERPL-MCNC: 0.9 MG/DL (ref 0.5–1.4)
EST. GFR  (AFRICAN AMERICAN): >60 ML/MIN/1.73 M^2
EST. GFR  (NON AFRICAN AMERICAN): >60 ML/MIN/1.73 M^2
GLUCOSE SERPL-MCNC: 93 MG/DL (ref 70–110)
HDLC SERPL-MCNC: 63 MG/DL (ref 40–75)
HDLC SERPL: 41.4 % (ref 20–50)
LDLC SERPL CALC-MCNC: 76.8 MG/DL (ref 63–159)
NONHDLC SERPL-MCNC: 89 MG/DL
POTASSIUM SERPL-SCNC: 3.8 MMOL/L (ref 3.5–5.1)
PROT SERPL-MCNC: 6.3 G/DL (ref 6–8.4)
SODIUM SERPL-SCNC: 139 MMOL/L (ref 136–145)
TRIGL SERPL-MCNC: 61 MG/DL (ref 30–150)

## 2021-07-13 PROCEDURE — 80053 COMPREHEN METABOLIC PANEL: CPT | Performed by: INTERNAL MEDICINE

## 2021-07-13 PROCEDURE — 36415 COLL VENOUS BLD VENIPUNCTURE: CPT | Mod: PO | Performed by: INTERNAL MEDICINE

## 2021-07-13 PROCEDURE — 80061 LIPID PANEL: CPT | Performed by: INTERNAL MEDICINE

## 2021-07-20 ENCOUNTER — OFFICE VISIT (OUTPATIENT)
Dept: FAMILY MEDICINE | Facility: CLINIC | Age: 76
End: 2021-07-20
Payer: MEDICARE

## 2021-07-20 VITALS
HEIGHT: 72 IN | SYSTOLIC BLOOD PRESSURE: 108 MMHG | TEMPERATURE: 99 F | DIASTOLIC BLOOD PRESSURE: 64 MMHG | HEART RATE: 66 BPM | OXYGEN SATURATION: 96 % | BODY MASS INDEX: 23.14 KG/M2 | WEIGHT: 170.88 LBS

## 2021-07-20 DIAGNOSIS — Z12.5 ENCOUNTER FOR SCREENING FOR MALIGNANT NEOPLASM OF PROSTATE: ICD-10-CM

## 2021-07-20 DIAGNOSIS — I71.20 THORACIC AORTIC ANEURYSM, WITHOUT RUPTURE: ICD-10-CM

## 2021-07-20 DIAGNOSIS — R41.3 MEMORY CHANGE: ICD-10-CM

## 2021-07-20 DIAGNOSIS — I25.10 CORONARY ARTERY DISEASE, ANGINA PRESENCE UNSPECIFIED, UNSPECIFIED VESSEL OR LESION TYPE, UNSPECIFIED WHETHER NATIVE OR TRANSPLANTED HEART: ICD-10-CM

## 2021-07-20 DIAGNOSIS — E78.5 DYSLIPIDEMIA: Primary | ICD-10-CM

## 2021-07-20 DIAGNOSIS — I10 ESSENTIAL HYPERTENSION: ICD-10-CM

## 2021-07-20 DIAGNOSIS — Z11.59 NEED FOR HEPATITIS C SCREENING TEST: ICD-10-CM

## 2021-07-20 DIAGNOSIS — Z79.899 ENCOUNTER FOR LONG-TERM (CURRENT) USE OF MEDICATIONS: ICD-10-CM

## 2021-07-20 PROCEDURE — 1159F PR MEDICATION LIST DOCUMENTED IN MEDICAL RECORD: ICD-10-PCS | Mod: CPTII,S$GLB,, | Performed by: INTERNAL MEDICINE

## 2021-07-20 PROCEDURE — 99999 PR PBB SHADOW E&M-EST. PATIENT-LVL III: CPT | Mod: PBBFAC,,, | Performed by: INTERNAL MEDICINE

## 2021-07-20 PROCEDURE — 1101F PT FALLS ASSESS-DOCD LE1/YR: CPT | Mod: CPTII,S$GLB,, | Performed by: INTERNAL MEDICINE

## 2021-07-20 PROCEDURE — 1126F PR PAIN SEVERITY QUANTIFIED, NO PAIN PRESENT: ICD-10-PCS | Mod: CPTII,S$GLB,, | Performed by: INTERNAL MEDICINE

## 2021-07-20 PROCEDURE — 1126F AMNT PAIN NOTED NONE PRSNT: CPT | Mod: CPTII,S$GLB,, | Performed by: INTERNAL MEDICINE

## 2021-07-20 PROCEDURE — 3074F PR MOST RECENT SYSTOLIC BLOOD PRESSURE < 130 MM HG: ICD-10-PCS | Mod: CPTII,S$GLB,, | Performed by: INTERNAL MEDICINE

## 2021-07-20 PROCEDURE — 99214 PR OFFICE/OUTPT VISIT, EST, LEVL IV, 30-39 MIN: ICD-10-PCS | Mod: S$GLB,,, | Performed by: INTERNAL MEDICINE

## 2021-07-20 PROCEDURE — 3288F FALL RISK ASSESSMENT DOCD: CPT | Mod: CPTII,S$GLB,, | Performed by: INTERNAL MEDICINE

## 2021-07-20 PROCEDURE — 1101F PR PT FALLS ASSESS DOC 0-1 FALLS W/OUT INJ PAST YR: ICD-10-PCS | Mod: CPTII,S$GLB,, | Performed by: INTERNAL MEDICINE

## 2021-07-20 PROCEDURE — 1159F MED LIST DOCD IN RCRD: CPT | Mod: CPTII,S$GLB,, | Performed by: INTERNAL MEDICINE

## 2021-07-20 PROCEDURE — 3078F PR MOST RECENT DIASTOLIC BLOOD PRESSURE < 80 MM HG: ICD-10-PCS | Mod: CPTII,S$GLB,, | Performed by: INTERNAL MEDICINE

## 2021-07-20 PROCEDURE — 3074F SYST BP LT 130 MM HG: CPT | Mod: CPTII,S$GLB,, | Performed by: INTERNAL MEDICINE

## 2021-07-20 PROCEDURE — 99999 PR PBB SHADOW E&M-EST. PATIENT-LVL III: ICD-10-PCS | Mod: PBBFAC,,, | Performed by: INTERNAL MEDICINE

## 2021-07-20 PROCEDURE — 99214 OFFICE O/P EST MOD 30 MIN: CPT | Mod: S$GLB,,, | Performed by: INTERNAL MEDICINE

## 2021-07-20 PROCEDURE — 3288F PR FALLS RISK ASSESSMENT DOCUMENTED: ICD-10-PCS | Mod: CPTII,S$GLB,, | Performed by: INTERNAL MEDICINE

## 2021-07-20 PROCEDURE — 3078F DIAST BP <80 MM HG: CPT | Mod: CPTII,S$GLB,, | Performed by: INTERNAL MEDICINE

## 2021-07-20 RX ORDER — PERINDOPRIL ERBUMINE 4 MG/1
4 TABLET ORAL DAILY
Qty: 90 TABLET | Refills: 3 | Status: SHIPPED | OUTPATIENT
Start: 2021-07-20 | End: 2022-07-06 | Stop reason: SDUPTHER

## 2021-07-20 RX ORDER — EZETIMIBE 10 MG/1
10 TABLET ORAL DAILY
Qty: 90 TABLET | Refills: 3 | Status: SHIPPED | OUTPATIENT
Start: 2021-07-20 | End: 2022-07-06 | Stop reason: SDUPTHER

## 2021-08-19 ENCOUNTER — TELEPHONE (OUTPATIENT)
Dept: FAMILY MEDICINE | Facility: CLINIC | Age: 76
End: 2021-08-19

## 2021-08-19 DIAGNOSIS — E78.5 DYSLIPIDEMIA: ICD-10-CM

## 2021-08-19 RX ORDER — ATORVASTATIN CALCIUM 80 MG/1
80 TABLET, FILM COATED ORAL DAILY
Qty: 90 TABLET | Refills: 3 | Status: SHIPPED | OUTPATIENT
Start: 2021-08-19 | End: 2022-07-06 | Stop reason: SDUPTHER

## 2021-10-07 ENCOUNTER — IMMUNIZATION (OUTPATIENT)
Dept: FAMILY MEDICINE | Facility: CLINIC | Age: 76
End: 2021-10-07
Payer: MEDICARE

## 2021-10-07 DIAGNOSIS — Z23 NEED FOR VACCINATION: Primary | ICD-10-CM

## 2021-10-07 PROCEDURE — 0003A COVID-19, MRNA, LNP-S, PF, 30 MCG/0.3 ML DOSE VACCINE: CPT | Mod: PBBFAC | Performed by: FAMILY MEDICINE

## 2021-10-07 PROCEDURE — 91300 COVID-19, MRNA, LNP-S, PF, 30 MCG/0.3 ML DOSE VACCINE: CPT | Mod: PBBFAC | Performed by: FAMILY MEDICINE

## 2021-11-09 ENCOUNTER — TELEPHONE (OUTPATIENT)
Dept: FAMILY MEDICINE | Facility: CLINIC | Age: 76
End: 2021-11-09
Payer: MEDICARE

## 2021-11-26 ENCOUNTER — TELEPHONE (OUTPATIENT)
Dept: UROLOGY | Facility: CLINIC | Age: 76
End: 2021-11-26
Payer: MEDICARE

## 2021-12-06 ENCOUNTER — PATIENT OUTREACH (OUTPATIENT)
Dept: ADMINISTRATIVE | Facility: OTHER | Age: 76
End: 2021-12-06
Payer: MEDICARE

## 2021-12-08 ENCOUNTER — OFFICE VISIT (OUTPATIENT)
Dept: UROLOGY | Facility: CLINIC | Age: 76
End: 2021-12-08
Payer: MEDICARE

## 2021-12-08 VITALS — WEIGHT: 170.88 LBS | HEIGHT: 72 IN | BODY MASS INDEX: 23.14 KG/M2

## 2021-12-08 DIAGNOSIS — R31.0 GROSS HEMATURIA: Primary | ICD-10-CM

## 2021-12-08 LAB
BILIRUB SERPL-MCNC: ABNORMAL MG/DL
BLOOD URINE, POC: ABNORMAL
CLARITY, POC UA: CLEAR
COLOR, POC UA: YELLOW
GLUCOSE UR QL STRIP: ABNORMAL
KETONES UR QL STRIP: ABNORMAL
LEUKOCYTE ESTERASE URINE, POC: ABNORMAL
NITRITE, POC UA: ABNORMAL
PH, POC UA: 6
PROTEIN, POC: ABNORMAL
SPECIFIC GRAVITY, POC UA: 1.02
UROBILINOGEN, POC UA: 0.2

## 2021-12-08 PROCEDURE — 99214 PR OFFICE/OUTPT VISIT, EST, LEVL IV, 30-39 MIN: ICD-10-PCS | Mod: S$GLB,,, | Performed by: UROLOGY

## 2021-12-08 PROCEDURE — 81002 URINALYSIS NONAUTO W/O SCOPE: CPT | Mod: S$GLB,,, | Performed by: UROLOGY

## 2021-12-08 PROCEDURE — 99999 PR PBB SHADOW E&M-EST. PATIENT-LVL III: CPT | Mod: PBBFAC,,, | Performed by: UROLOGY

## 2021-12-08 PROCEDURE — 81002 POCT URINE DIPSTICK WITHOUT MICROSCOPE: ICD-10-PCS | Mod: S$GLB,,, | Performed by: UROLOGY

## 2021-12-08 PROCEDURE — 99214 OFFICE O/P EST MOD 30 MIN: CPT | Mod: S$GLB,,, | Performed by: UROLOGY

## 2021-12-08 PROCEDURE — 99999 PR PBB SHADOW E&M-EST. PATIENT-LVL III: ICD-10-PCS | Mod: PBBFAC,,, | Performed by: UROLOGY

## 2021-12-13 ENCOUNTER — LAB VISIT (OUTPATIENT)
Dept: LAB | Facility: HOSPITAL | Age: 76
End: 2021-12-13
Attending: UROLOGY
Payer: MEDICARE

## 2021-12-13 DIAGNOSIS — R31.0 GROSS HEMATURIA: ICD-10-CM

## 2021-12-13 LAB
CREAT SERPL-MCNC: 0.9 MG/DL (ref 0.5–1.4)
EST. GFR  (AFRICAN AMERICAN): >60 ML/MIN/1.73 M^2
EST. GFR  (NON AFRICAN AMERICAN): >60 ML/MIN/1.73 M^2

## 2021-12-13 PROCEDURE — 36415 COLL VENOUS BLD VENIPUNCTURE: CPT | Mod: PO | Performed by: UROLOGY

## 2021-12-13 PROCEDURE — 82565 ASSAY OF CREATININE: CPT | Performed by: UROLOGY

## 2021-12-14 ENCOUNTER — TELEPHONE (OUTPATIENT)
Dept: UROLOGY | Facility: CLINIC | Age: 76
End: 2021-12-14
Payer: MEDICARE

## 2021-12-15 ENCOUNTER — HOSPITAL ENCOUNTER (OUTPATIENT)
Dept: RADIOLOGY | Facility: HOSPITAL | Age: 76
Discharge: HOME | End: 2021-12-15
Attending: UROLOGY
Payer: MEDICARE

## 2021-12-15 DIAGNOSIS — R31.0 GROSS HEMATURIA: ICD-10-CM

## 2021-12-15 PROCEDURE — 25500020 PHARM REV CODE 255: Mod: PO | Performed by: UROLOGY

## 2021-12-15 PROCEDURE — 74178 CT ABD&PLV WO CNTR FLWD CNTR: CPT | Mod: 26,,, | Performed by: RADIOLOGY

## 2021-12-15 PROCEDURE — 74178 CT ABD&PLV WO CNTR FLWD CNTR: CPT | Mod: TC,PO

## 2021-12-15 PROCEDURE — 74178 CT UROGRAM ABD PELVIS W WO: ICD-10-PCS | Mod: 26,,, | Performed by: RADIOLOGY

## 2021-12-15 RX ADMIN — IOHEXOL 125 ML: 350 INJECTION, SOLUTION INTRAVENOUS at 03:12

## 2021-12-16 ENCOUNTER — PATIENT MESSAGE (OUTPATIENT)
Dept: UROLOGY | Facility: CLINIC | Age: 76
End: 2021-12-16
Payer: MEDICARE

## 2021-12-16 DIAGNOSIS — N20.0 KIDNEY STONE: Primary | ICD-10-CM

## 2021-12-22 ENCOUNTER — OFFICE VISIT (OUTPATIENT)
Dept: FAMILY MEDICINE | Facility: CLINIC | Age: 76
End: 2021-12-22
Payer: MEDICARE

## 2021-12-22 VITALS
SYSTOLIC BLOOD PRESSURE: 112 MMHG | WEIGHT: 171 LBS | OXYGEN SATURATION: 95 % | HEART RATE: 65 BPM | BODY MASS INDEX: 23.16 KG/M2 | DIASTOLIC BLOOD PRESSURE: 60 MMHG | HEIGHT: 72 IN

## 2021-12-22 DIAGNOSIS — I25.10 CORONARY ARTERY DISEASE, UNSPECIFIED VESSEL OR LESION TYPE, UNSPECIFIED WHETHER ANGINA PRESENT, UNSPECIFIED WHETHER NATIVE OR TRANSPLANTED HEART: ICD-10-CM

## 2021-12-22 DIAGNOSIS — I10 ESSENTIAL HYPERTENSION: Primary | ICD-10-CM

## 2021-12-22 DIAGNOSIS — E78.5 DYSLIPIDEMIA: ICD-10-CM

## 2021-12-22 PROCEDURE — 99999 PR PBB SHADOW E&M-EST. PATIENT-LVL III: ICD-10-PCS | Mod: PBBFAC,,, | Performed by: INTERNAL MEDICINE

## 2021-12-22 PROCEDURE — 3078F PR MOST RECENT DIASTOLIC BLOOD PRESSURE < 80 MM HG: ICD-10-PCS | Mod: CPTII,S$GLB,, | Performed by: INTERNAL MEDICINE

## 2021-12-22 PROCEDURE — 1126F PR PAIN SEVERITY QUANTIFIED, NO PAIN PRESENT: ICD-10-PCS | Mod: CPTII,S$GLB,, | Performed by: INTERNAL MEDICINE

## 2021-12-22 PROCEDURE — 1159F PR MEDICATION LIST DOCUMENTED IN MEDICAL RECORD: ICD-10-PCS | Mod: CPTII,S$GLB,, | Performed by: INTERNAL MEDICINE

## 2021-12-22 PROCEDURE — 99215 PR OFFICE/OUTPT VISIT, EST, LEVL V, 40-54 MIN: ICD-10-PCS | Mod: S$GLB,,, | Performed by: INTERNAL MEDICINE

## 2021-12-22 PROCEDURE — 99999 PR PBB SHADOW E&M-EST. PATIENT-LVL III: CPT | Mod: PBBFAC,,, | Performed by: INTERNAL MEDICINE

## 2021-12-22 PROCEDURE — 3078F DIAST BP <80 MM HG: CPT | Mod: CPTII,S$GLB,, | Performed by: INTERNAL MEDICINE

## 2021-12-22 PROCEDURE — 3074F SYST BP LT 130 MM HG: CPT | Mod: CPTII,S$GLB,, | Performed by: INTERNAL MEDICINE

## 2021-12-22 PROCEDURE — 99215 OFFICE O/P EST HI 40 MIN: CPT | Mod: S$GLB,,, | Performed by: INTERNAL MEDICINE

## 2021-12-22 PROCEDURE — 3074F PR MOST RECENT SYSTOLIC BLOOD PRESSURE < 130 MM HG: ICD-10-PCS | Mod: CPTII,S$GLB,, | Performed by: INTERNAL MEDICINE

## 2021-12-22 PROCEDURE — 1159F MED LIST DOCD IN RCRD: CPT | Mod: CPTII,S$GLB,, | Performed by: INTERNAL MEDICINE

## 2021-12-22 PROCEDURE — 1126F AMNT PAIN NOTED NONE PRSNT: CPT | Mod: CPTII,S$GLB,, | Performed by: INTERNAL MEDICINE

## 2022-01-13 ENCOUNTER — PROCEDURE VISIT (OUTPATIENT)
Dept: UROLOGY | Facility: CLINIC | Age: 77
End: 2022-01-13
Payer: MEDICARE

## 2022-01-13 ENCOUNTER — HOSPITAL ENCOUNTER (OUTPATIENT)
Dept: RADIOLOGY | Facility: HOSPITAL | Age: 77
Discharge: HOME | End: 2022-01-13
Attending: UROLOGY
Payer: MEDICARE

## 2022-01-13 VITALS — BODY MASS INDEX: 23.17 KG/M2 | HEIGHT: 72 IN | WEIGHT: 171.06 LBS

## 2022-01-13 DIAGNOSIS — N20.0 KIDNEY STONE: ICD-10-CM

## 2022-01-13 DIAGNOSIS — R31.0 GROSS HEMATURIA: ICD-10-CM

## 2022-01-13 PROCEDURE — 74018 RADEX ABDOMEN 1 VIEW: CPT | Mod: 26,,, | Performed by: RADIOLOGY

## 2022-01-13 PROCEDURE — 74018 RADEX ABDOMEN 1 VIEW: CPT | Mod: TC,FY,PO

## 2022-01-13 PROCEDURE — 52000 CYSTOSCOPY: ICD-10-PCS | Mod: S$GLB,,, | Performed by: UROLOGY

## 2022-01-13 PROCEDURE — 52000 CYSTOURETHROSCOPY: CPT | Mod: S$GLB,,, | Performed by: UROLOGY

## 2022-01-13 PROCEDURE — 74018 XR ABDOMEN AP 1 VIEW: ICD-10-PCS | Mod: 26,,, | Performed by: RADIOLOGY

## 2022-01-13 NOTE — PROCEDURES
"Cystoscopy  Performed by: MARIAH Gonzales MD  Authorized by: MARIAH Gonzales MD     Consent Done?:  Yes (Written)  Time out: Immediately prior to procedure a "time out" was called to verify the correct patient, procedure, equipment, support staff and site/side marked as required.    Indications: hematuria    Position:  Supine  Anesthesia:  Lidocaine jelly  Patient sedated?: No    Preparation: Patient was prepped and draped in usual sterile fashion      Scope type:  Flexible cystoscope    Patient tolerance:  Patient tolerated the procedure well with no immediate complications    Blood Loss:  None    The patients clinic history and physical were reviewed and there are no changes.     The flexible cystoscope was placed into the urethra and carefully advanced into the bladder.  A careful cystoscopic exam was then performed.  The entire bladder mucosa was systematically visualized.  Findings include moderate bladder wall trabeculation.  There were no lesions, masses foreign bodies or stones.   Each ureteral orifices were visualized and both had clear efflux of urine.  On retroflexion there was a large intravesical gland.  The cystoscope was then removed and I examined the entire length of the urethra.  There was moderate to severe trilobar enlargement of the prostate otherwise the urethra appeared normal.  He tolerated the procedure well.  There were no complications    Impression:  BPH, otherwise normal cystoscopy.    Plan:  He had a 4 mm stone in the left renal pelvis which may be the source of the hematuria.  He began having flank pain over the weekend which has now resolved.  He is pain-free today.  I recommend hydration and begin straining urine.  Follow-up if pain returns and persists.    "

## 2022-05-09 ENCOUNTER — PATIENT MESSAGE (OUTPATIENT)
Dept: SMOKING CESSATION | Facility: CLINIC | Age: 77
End: 2022-05-09
Payer: MEDICARE

## 2022-05-16 ENCOUNTER — PATIENT OUTREACH (OUTPATIENT)
Dept: ADMINISTRATIVE | Facility: HOSPITAL | Age: 77
End: 2022-05-16
Payer: MEDICARE

## 2022-05-16 NOTE — PROGRESS NOTES
Non-compliant report chart audits for HYPERTENSION MANAGEMENT    Outreach to patient in reference to hypertension management    RE:  Patient hypertension management    Pt has appt in July-he does not monitor bp at home and will keep July appt    Outreach:  Hypertension Management

## 2022-05-27 ENCOUNTER — PATIENT MESSAGE (OUTPATIENT)
Dept: FAMILY MEDICINE | Facility: CLINIC | Age: 77
End: 2022-05-27
Payer: MEDICARE

## 2022-05-27 ENCOUNTER — TELEPHONE (OUTPATIENT)
Dept: FAMILY MEDICINE | Facility: CLINIC | Age: 77
End: 2022-05-27
Payer: MEDICARE

## 2022-06-29 ENCOUNTER — LAB VISIT (OUTPATIENT)
Dept: LAB | Facility: HOSPITAL | Age: 77
End: 2022-06-29
Attending: INTERNAL MEDICINE
Payer: MEDICARE

## 2022-06-29 DIAGNOSIS — I10 ESSENTIAL HYPERTENSION: ICD-10-CM

## 2022-06-29 DIAGNOSIS — E78.5 DYSLIPIDEMIA: ICD-10-CM

## 2022-06-29 LAB
ALBUMIN SERPL BCP-MCNC: 3.8 G/DL (ref 3.5–5.2)
ALP SERPL-CCNC: 96 U/L (ref 55–135)
ALT SERPL W/O P-5'-P-CCNC: 22 U/L (ref 10–44)
ANION GAP SERPL CALC-SCNC: 8 MMOL/L (ref 8–16)
AST SERPL-CCNC: 24 U/L (ref 10–40)
BILIRUB SERPL-MCNC: 1 MG/DL (ref 0.1–1)
BUN SERPL-MCNC: 20 MG/DL (ref 8–23)
CALCIUM SERPL-MCNC: 9.3 MG/DL (ref 8.7–10.5)
CHLORIDE SERPL-SCNC: 107 MMOL/L (ref 95–110)
CHOLEST SERPL-MCNC: 133 MG/DL (ref 120–199)
CHOLEST/HDLC SERPL: 2.3 {RATIO} (ref 2–5)
CO2 SERPL-SCNC: 26 MMOL/L (ref 23–29)
CREAT SERPL-MCNC: 0.9 MG/DL (ref 0.5–1.4)
EST. GFR  (AFRICAN AMERICAN): >60 ML/MIN/1.73 M^2
EST. GFR  (NON AFRICAN AMERICAN): >60 ML/MIN/1.73 M^2
GLUCOSE SERPL-MCNC: 78 MG/DL (ref 70–110)
HDLC SERPL-MCNC: 59 MG/DL (ref 40–75)
HDLC SERPL: 44.4 % (ref 20–50)
LDLC SERPL CALC-MCNC: 63.2 MG/DL (ref 63–159)
NONHDLC SERPL-MCNC: 74 MG/DL
POTASSIUM SERPL-SCNC: 4.7 MMOL/L (ref 3.5–5.1)
PROT SERPL-MCNC: 6.4 G/DL (ref 6–8.4)
SODIUM SERPL-SCNC: 141 MMOL/L (ref 136–145)
TRIGL SERPL-MCNC: 54 MG/DL (ref 30–150)

## 2022-06-29 PROCEDURE — 80061 LIPID PANEL: CPT | Performed by: INTERNAL MEDICINE

## 2022-06-29 PROCEDURE — 80053 COMPREHEN METABOLIC PANEL: CPT | Performed by: INTERNAL MEDICINE

## 2022-06-29 PROCEDURE — 36415 COLL VENOUS BLD VENIPUNCTURE: CPT | Mod: PO | Performed by: INTERNAL MEDICINE

## 2022-06-30 ENCOUNTER — OFFICE VISIT (OUTPATIENT)
Dept: ORTHOPEDICS | Facility: CLINIC | Age: 77
End: 2022-06-30
Payer: MEDICARE

## 2022-06-30 ENCOUNTER — HOSPITAL ENCOUNTER (OUTPATIENT)
Dept: RADIOLOGY | Facility: HOSPITAL | Age: 77
Discharge: HOME | End: 2022-06-30
Attending: ORTHOPAEDIC SURGERY
Payer: MEDICARE

## 2022-06-30 DIAGNOSIS — M25.572 LEFT ANKLE PAIN: ICD-10-CM

## 2022-06-30 DIAGNOSIS — M25.572 LEFT ANKLE PAIN: Primary | ICD-10-CM

## 2022-06-30 DIAGNOSIS — M19.072 ARTHRITIS OF LEFT SUBTALAR JOINT: Primary | ICD-10-CM

## 2022-06-30 PROCEDURE — 1159F MED LIST DOCD IN RCRD: CPT | Mod: CPTII,S$GLB,, | Performed by: ORTHOPAEDIC SURGERY

## 2022-06-30 PROCEDURE — 1125F PR PAIN SEVERITY QUANTIFIED, PAIN PRESENT: ICD-10-PCS | Mod: CPTII,S$GLB,, | Performed by: ORTHOPAEDIC SURGERY

## 2022-06-30 PROCEDURE — 1101F PT FALLS ASSESS-DOCD LE1/YR: CPT | Mod: CPTII,S$GLB,, | Performed by: ORTHOPAEDIC SURGERY

## 2022-06-30 PROCEDURE — 99204 OFFICE O/P NEW MOD 45 MIN: CPT | Mod: S$GLB,,, | Performed by: ORTHOPAEDIC SURGERY

## 2022-06-30 PROCEDURE — 3288F FALL RISK ASSESSMENT DOCD: CPT | Mod: CPTII,S$GLB,, | Performed by: ORTHOPAEDIC SURGERY

## 2022-06-30 PROCEDURE — 73610 X-RAY EXAM OF ANKLE: CPT | Mod: TC,PO,LT

## 2022-06-30 PROCEDURE — 73610 X-RAY EXAM OF ANKLE: CPT | Mod: 26,LT,, | Performed by: RADIOLOGY

## 2022-06-30 PROCEDURE — 99999 PR PBB SHADOW E&M-EST. PATIENT-LVL II: ICD-10-PCS | Mod: PBBFAC,,, | Performed by: ORTHOPAEDIC SURGERY

## 2022-06-30 PROCEDURE — 1101F PR PT FALLS ASSESS DOC 0-1 FALLS W/OUT INJ PAST YR: ICD-10-PCS | Mod: CPTII,S$GLB,, | Performed by: ORTHOPAEDIC SURGERY

## 2022-06-30 PROCEDURE — 1160F RVW MEDS BY RX/DR IN RCRD: CPT | Mod: CPTII,S$GLB,, | Performed by: ORTHOPAEDIC SURGERY

## 2022-06-30 PROCEDURE — 3288F PR FALLS RISK ASSESSMENT DOCUMENTED: ICD-10-PCS | Mod: CPTII,S$GLB,, | Performed by: ORTHOPAEDIC SURGERY

## 2022-06-30 PROCEDURE — 1160F PR REVIEW ALL MEDS BY PRESCRIBER/CLIN PHARMACIST DOCUMENTED: ICD-10-PCS | Mod: CPTII,S$GLB,, | Performed by: ORTHOPAEDIC SURGERY

## 2022-06-30 PROCEDURE — 1125F AMNT PAIN NOTED PAIN PRSNT: CPT | Mod: CPTII,S$GLB,, | Performed by: ORTHOPAEDIC SURGERY

## 2022-06-30 PROCEDURE — 99999 PR PBB SHADOW E&M-EST. PATIENT-LVL II: CPT | Mod: PBBFAC,,, | Performed by: ORTHOPAEDIC SURGERY

## 2022-06-30 PROCEDURE — 1159F PR MEDICATION LIST DOCUMENTED IN MEDICAL RECORD: ICD-10-PCS | Mod: CPTII,S$GLB,, | Performed by: ORTHOPAEDIC SURGERY

## 2022-06-30 PROCEDURE — 73610 XR ANKLE COMPLETE 3 VIEW LEFT: ICD-10-PCS | Mod: 26,LT,, | Performed by: RADIOLOGY

## 2022-06-30 PROCEDURE — 99204 PR OFFICE/OUTPT VISIT, NEW, LEVL IV, 45-59 MIN: ICD-10-PCS | Mod: S$GLB,,, | Performed by: ORTHOPAEDIC SURGERY

## 2022-06-30 RX ORDER — METHOCARBAMOL 750 MG/1
750 TABLET, FILM COATED ORAL 4 TIMES DAILY PRN
Qty: 44 TABLET | Refills: 3 | Status: SHIPPED | OUTPATIENT
Start: 2022-06-30 | End: 2023-10-12

## 2022-06-30 NOTE — PROGRESS NOTES
Chief Complaint   Patient presents with    Left Ankle - Pain       HPI:    This is a 77 y.o. who presents today complaining of left ankle pain for 1 years after indergoing left ankle fusion almost 20 years ago. Pain is dull. No numbness or tingling. No associated signs or symptoms.      Past Medical History:   Diagnosis Date    Cataracts, bilateral     2009, 2013    Coronary artery disease 2004    Hypercholesteremia     Kidney stone 2015    Urerteroscopy    Skin cancer 2015    left ankle/ left ear      Past Surgical History:   Procedure Laterality Date    ANKLE FUSION      CIRCUMCISION      FOOT SURGERY      HERNIA REPAIR Left 2008    KNEE ARTHROSCOPY Left 2010    KNEE ARTHROSCOPY Right 2010    KNEE SURGERY Left 2010    partial replacement    KNEE SURGERY Right 2012    replacement    MOUTH SURGERY      TOE SURGERY      TONSILLECTOMY        Current Outpatient Medications on File Prior to Visit   Medication Sig Dispense Refill    aspirin (ECOTRIN) 81 MG EC tablet Take 81 mg by mouth once daily.      atorvastatin (LIPITOR) 80 MG tablet Take 1 tablet (80 mg total) by mouth once daily. 90 tablet 3    ezetimibe (ZETIA) 10 mg tablet Take 1 tablet (10 mg total) by mouth once daily. 90 tablet 3    ibuprofen (ADVIL,MOTRIN) 200 MG tablet Take 200 mg by mouth every 6 (six) hours as needed for Pain.      perindopril erbumine (ACEON) 4 mg tablet Take 1 tablet (4 mg total) by mouth once daily. 90 tablet 3    tadalafiL (CIALIS) 20 MG Tab Take 1 tablet (20 mg total) by mouth once daily. 30 tablet 11     No current facility-administered medications on file prior to visit.      Review of patient's allergies indicates:  No Known Allergies   Family History not pertinent   Social History     Socioeconomic History    Marital status:    Tobacco Use    Smoking status: Current Some Day Smoker     Types: Cigars    Smokeless tobacco: Never Used   Substance and Sexual Activity    Alcohol use: Yes    Drug use:  No    Sexual activity: Yes         Review of Systems:   Constitutional:  Denies fever or chills    Eyes:  Denies change in visual acuity    HENT:  Denies nasal congestion or sore throat    Respiratory:  Denies cough or shortness of breath    Cardiovascular:  Denies chest pain or edema    GI:  Denies abdominal pain, nausea, vomiting, bloody stools or diarrhea    :  Denies dysuria    Integument:  Denies rash    Neurologic:  Denies headache, focal weakness or sensory changes    Endocrine:  Denies polyuria or polydipsia    Lymphatic:  Denies swollen glands    Psychiatric:  Denies depression or anxiety       Physical Exam:    Constitutional:  Well developed, well nourished, no acute distress, non-toxic appearance    Integument:  Well hydrated, no rash    Lymphatic:  No lymphadenopathy noted    Neurologic:  Alert & oriented x 3,     Psychiatric:  Speech and behavior appropriate    Gi: abdomen soft  Eyes: EOMI   R ankle.  Exam performed same as contralateral side and is normal    L ankle  No tibiotalar ROM noted. Incisions healed. Mild point TTP about the subtalar joint. Pain with varus/valgus stress. NVI distally.      X-rays were performed today, personally reviewed by me and findings discussed with the patient.   3 views of the left ankle show healed tibiotalar fusion with degenerative changes of the subtalar joint      There are no diagnoses linked to this encounter.    Continue [place] brace for pickleball. Will call when injection needed. RTC as needed.

## 2022-07-06 ENCOUNTER — TELEPHONE (OUTPATIENT)
Dept: GASTROENTEROLOGY | Facility: CLINIC | Age: 77
End: 2022-07-06
Payer: MEDICARE

## 2022-07-06 ENCOUNTER — OFFICE VISIT (OUTPATIENT)
Dept: FAMILY MEDICINE | Facility: CLINIC | Age: 77
End: 2022-07-06
Payer: MEDICARE

## 2022-07-06 VITALS
SYSTOLIC BLOOD PRESSURE: 118 MMHG | DIASTOLIC BLOOD PRESSURE: 74 MMHG | BODY MASS INDEX: 23.86 KG/M2 | HEIGHT: 72 IN | WEIGHT: 176.13 LBS | TEMPERATURE: 99 F | HEART RATE: 79 BPM | OXYGEN SATURATION: 96 %

## 2022-07-06 DIAGNOSIS — Z12.5 ENCOUNTER FOR SCREENING FOR MALIGNANT NEOPLASM OF PROSTATE: ICD-10-CM

## 2022-07-06 DIAGNOSIS — E78.5 DYSLIPIDEMIA: ICD-10-CM

## 2022-07-06 DIAGNOSIS — I10 ESSENTIAL HYPERTENSION: ICD-10-CM

## 2022-07-06 DIAGNOSIS — I25.10 CORONARY ARTERY DISEASE: ICD-10-CM

## 2022-07-06 DIAGNOSIS — I71.20 THORACIC AORTIC ANEURYSM, WITHOUT RUPTURE: ICD-10-CM

## 2022-07-06 DIAGNOSIS — Z00.00 ROUTINE PHYSICAL EXAMINATION: Primary | ICD-10-CM

## 2022-07-06 DIAGNOSIS — Z79.899 ENCOUNTER FOR LONG-TERM (CURRENT) USE OF MEDICATIONS: ICD-10-CM

## 2022-07-06 PROCEDURE — 3288F PR FALLS RISK ASSESSMENT DOCUMENTED: ICD-10-PCS | Mod: CPTII,S$GLB,, | Performed by: INTERNAL MEDICINE

## 2022-07-06 PROCEDURE — 99397 PER PM REEVAL EST PAT 65+ YR: CPT | Mod: S$GLB,,, | Performed by: INTERNAL MEDICINE

## 2022-07-06 PROCEDURE — 99499 RISK ADDL DX/OHS AUDIT: ICD-10-PCS | Mod: S$GLB,,, | Performed by: INTERNAL MEDICINE

## 2022-07-06 PROCEDURE — 3078F DIAST BP <80 MM HG: CPT | Mod: CPTII,S$GLB,, | Performed by: INTERNAL MEDICINE

## 2022-07-06 PROCEDURE — 3074F SYST BP LT 130 MM HG: CPT | Mod: CPTII,S$GLB,, | Performed by: INTERNAL MEDICINE

## 2022-07-06 PROCEDURE — 99999 PR PBB SHADOW E&M-EST. PATIENT-LVL III: CPT | Mod: PBBFAC,,, | Performed by: INTERNAL MEDICINE

## 2022-07-06 PROCEDURE — 1101F PT FALLS ASSESS-DOCD LE1/YR: CPT | Mod: CPTII,S$GLB,, | Performed by: INTERNAL MEDICINE

## 2022-07-06 PROCEDURE — 1126F AMNT PAIN NOTED NONE PRSNT: CPT | Mod: CPTII,S$GLB,, | Performed by: INTERNAL MEDICINE

## 2022-07-06 PROCEDURE — 1101F PR PT FALLS ASSESS DOC 0-1 FALLS W/OUT INJ PAST YR: ICD-10-PCS | Mod: CPTII,S$GLB,, | Performed by: INTERNAL MEDICINE

## 2022-07-06 PROCEDURE — 99999 PR PBB SHADOW E&M-EST. PATIENT-LVL III: ICD-10-PCS | Mod: PBBFAC,,, | Performed by: INTERNAL MEDICINE

## 2022-07-06 PROCEDURE — 1126F PR PAIN SEVERITY QUANTIFIED, NO PAIN PRESENT: ICD-10-PCS | Mod: CPTII,S$GLB,, | Performed by: INTERNAL MEDICINE

## 2022-07-06 PROCEDURE — 3074F PR MOST RECENT SYSTOLIC BLOOD PRESSURE < 130 MM HG: ICD-10-PCS | Mod: CPTII,S$GLB,, | Performed by: INTERNAL MEDICINE

## 2022-07-06 PROCEDURE — 3288F FALL RISK ASSESSMENT DOCD: CPT | Mod: CPTII,S$GLB,, | Performed by: INTERNAL MEDICINE

## 2022-07-06 PROCEDURE — 99397 PR PREVENTIVE VISIT,EST,65 & OVER: ICD-10-PCS | Mod: S$GLB,,, | Performed by: INTERNAL MEDICINE

## 2022-07-06 PROCEDURE — 1160F RVW MEDS BY RX/DR IN RCRD: CPT | Mod: CPTII,S$GLB,, | Performed by: INTERNAL MEDICINE

## 2022-07-06 PROCEDURE — 1160F PR REVIEW ALL MEDS BY PRESCRIBER/CLIN PHARMACIST DOCUMENTED: ICD-10-PCS | Mod: CPTII,S$GLB,, | Performed by: INTERNAL MEDICINE

## 2022-07-06 PROCEDURE — 1159F MED LIST DOCD IN RCRD: CPT | Mod: CPTII,S$GLB,, | Performed by: INTERNAL MEDICINE

## 2022-07-06 PROCEDURE — 99499 UNLISTED E&M SERVICE: CPT | Mod: S$GLB,,, | Performed by: INTERNAL MEDICINE

## 2022-07-06 PROCEDURE — 3078F PR MOST RECENT DIASTOLIC BLOOD PRESSURE < 80 MM HG: ICD-10-PCS | Mod: CPTII,S$GLB,, | Performed by: INTERNAL MEDICINE

## 2022-07-06 PROCEDURE — 1159F PR MEDICATION LIST DOCUMENTED IN MEDICAL RECORD: ICD-10-PCS | Mod: CPTII,S$GLB,, | Performed by: INTERNAL MEDICINE

## 2022-07-06 RX ORDER — ATORVASTATIN CALCIUM 80 MG/1
80 TABLET, FILM COATED ORAL DAILY
Qty: 90 TABLET | Refills: 3 | Status: SHIPPED | OUTPATIENT
Start: 2022-07-06 | End: 2023-06-24

## 2022-07-06 RX ORDER — EZETIMIBE 10 MG/1
10 TABLET ORAL DAILY
Qty: 90 TABLET | Refills: 3 | Status: SHIPPED | OUTPATIENT
Start: 2022-07-06 | End: 2023-06-24

## 2022-07-06 RX ORDER — PERINDOPRIL ERBUMINE 4 MG/1
4 TABLET ORAL DAILY
Qty: 90 TABLET | Refills: 3 | Status: SHIPPED | OUTPATIENT
Start: 2022-07-06 | End: 2023-06-24

## 2022-07-06 NOTE — PROGRESS NOTES
Subjective:       Patient ID: Srinivasan Bernal III is a 77 y.o. male.    Chief Complaint: Annual Exam    Here for routine health maintenance.      Hematuria.  Cystoscope normal.  Urology.   Complains of mild memory changes.  Mostly with names and phrases.  Stable      CAD s/p 2 stents. No chest pain.  Dr Hernandez.   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   HENT: Negative for nosebleeds and trouble swallowing.    Eyes: Negative for discharge and visual disturbance.   Respiratory: Negative for choking and shortness of breath.    Cardiovascular: Negative for chest pain and palpitations.   Gastrointestinal: Negative for abdominal pain, nausea and vomiting.   Musculoskeletal: Negative for arthralgias and joint swelling.   Skin: Negative for rash and wound.   Neurological: Negative for dizziness and syncope.   Psychiatric/Behavioral: Negative for confusion and dysphoric mood.       Objective:      Vitals:    07/06/22 1034   BP: 118/74   Pulse: 79   Temp: 98.8 °F (37.1 °C)     Physical Exam  Vitals reviewed.   Eyes:      Conjunctiva/sclera: Conjunctivae normal.   Neck:      Thyroid: No thyromegaly.      Trachea: Trachea normal.   Cardiovascular:      Heart sounds: Normal heart sounds.      Comments: Edema negative  Pulmonary:      Effort: Pulmonary effort is normal.      Breath sounds: Normal breath sounds.   Abdominal:      Palpations: Abdomen is soft. There is no hepatomegaly.   Musculoskeletal:      Cervical back: Normal range of motion.      Comments: ROM normal bilateral except decreased left ankle (fused)  Strength normal bilateral   Skin:     General: Skin is warm and dry.   Neurological:      Cranial Nerves: No cranial nerve deficit.      Deep  Tendon Reflexes: Reflexes are normal and symmetric.   Psychiatric:      Comments: Alert and Oriented            Assessment:       1. Routine physical examination    2. Essential hypertension    3. Dyslipidemia    4. Encounter for screening for malignant neoplasm of prostate    5. Coronary artery disease    6. Thoracic aortic aneurysm, without rupture        Plan:       Routine physical examination    Essential hypertension  -     perindopril erbumine (ACEON) 4 mg tablet; Take 1 tablet (4 mg total) by mouth once daily.  Dispense: 90 tablet; Refill: 3    Dyslipidemia  -     atorvastatin (LIPITOR) 80 MG tablet; Take 1 tablet (80 mg total) by mouth once daily.  Dispense: 90 tablet; Refill: 3  -     ezetimibe (ZETIA) 10 mg tablet; Take 1 tablet (10 mg total) by mouth once daily.  Dispense: 90 tablet; Refill: 3    Encounter for screening for malignant neoplasm of prostate  -     Case Request Endoscopy: COLONOSCOPY    Coronary artery disease  -     ezetimibe (ZETIA) 10 mg tablet; Take 1 tablet (10 mg total) by mouth once daily.  Dispense: 90 tablet; Refill: 3    Thoracic aortic aneurysm, without rupture            Medication List with Changes/Refills   Current Medications    ASPIRIN (ECOTRIN) 81 MG EC TABLET    Take 81 mg by mouth once daily.    IBUPROFEN (ADVIL,MOTRIN) 200 MG TABLET    Take 200 mg by mouth every 6 (six) hours as needed for Pain.    METHOCARBAMOL (ROBAXIN) 750 MG TAB    Take 1 tablet (750 mg total) by mouth 4 (four) times daily as needed.    TADALAFIL (CIALIS) 20 MG TAB    Take 1 tablet (20 mg total) by mouth once daily.   Changed and/or Refilled Medications    Modified Medication Previous Medication    ATORVASTATIN (LIPITOR) 80 MG TABLET atorvastatin (LIPITOR) 80 MG tablet       Take 1 tablet (80 mg total) by mouth once daily.    Take 1 tablet (80 mg total) by mouth once daily.    EZETIMIBE (ZETIA) 10 MG TABLET ezetimibe (ZETIA) 10 mg tablet       Take 1 tablet (10 mg total) by mouth once daily.    Take 1  tablet (10 mg total) by mouth once daily.    PERINDOPRIL ERBUMINE (ACEON) 4 MG TABLET perindopril erbumine (ACEON) 4 mg tablet       Take 1 tablet (4 mg total) by mouth once daily.    Take 1 tablet (4 mg total) by mouth once daily.     Wellness reviewed        Continue current management and monitor.    Counseled on regular exercise, maintenance of a healthy weight, balanced diet rich in fruits/vegetables and lean protein, and avoidance of unhealthy habits like smoking and excessive alcohol intake.   Also, counseled on importance of being compliant with medication, health appointments, diet and exercise.     Follow up in about 6 months (around 1/6/2023).

## 2022-07-22 ENCOUNTER — TELEPHONE (OUTPATIENT)
Dept: UROLOGY | Facility: CLINIC | Age: 77
End: 2022-07-22
Payer: MEDICARE

## 2022-07-22 NOTE — TELEPHONE ENCOUNTER
----- Message from Blanka Harman sent at 7/22/2022 12:39 PM CDT -----  Type:  Sooner Apoointment Request    Caller is requesting a sooner appointment.  Caller declined first available appointment listed below.  Caller will not accept being placed on the waitlist and is requesting a message be sent to doctor.    Name of Caller:pt    When is the first available appointment?09/14    Symptoms: Annual f/u     Best Call Back Number:[phone]

## 2022-09-15 ENCOUNTER — TELEPHONE (OUTPATIENT)
Dept: ENDOSCOPY | Facility: HOSPITAL | Age: 77
End: 2022-09-15
Payer: MEDICARE

## 2022-09-15 NOTE — TELEPHONE ENCOUNTER
Good morning,    Please contact patient regarding new prep instructions. Thank you!    Sally Dorado

## 2022-09-19 ENCOUNTER — ANESTHESIA EVENT (OUTPATIENT)
Dept: ENDOSCOPY | Facility: HOSPITAL | Age: 77
End: 2022-09-19
Payer: MEDICARE

## 2022-09-19 ENCOUNTER — TELEPHONE (OUTPATIENT)
Dept: GASTROENTEROLOGY | Facility: CLINIC | Age: 77
End: 2022-09-19
Payer: MEDICARE

## 2022-09-19 NOTE — TELEPHONE ENCOUNTER
----- Message from Tomeka Gonzalez sent at 9/19/2022  8:44 AM CDT -----  Contact: pt  Type:  requesting call back   Who Called:  pt   Best Call Back Number: [phone]    Additional Information: calling the office to speak to the team in regards to procedure tomorrow 09/20.. please call and adv-

## 2022-09-19 NOTE — TELEPHONE ENCOUNTER
Call placed to Tomasa Bernal in regards to message received. Questions answered about how much MiraLAX and Gatorade he needs. He verbalized understanding. No further issues noted.

## 2022-09-20 ENCOUNTER — HOSPITAL ENCOUNTER (OUTPATIENT)
Facility: HOSPITAL | Age: 77
Discharge: HOME | End: 2022-09-20
Attending: INTERNAL MEDICINE | Admitting: INTERNAL MEDICINE
Payer: MEDICARE

## 2022-09-20 ENCOUNTER — ANESTHESIA (OUTPATIENT)
Dept: ENDOSCOPY | Facility: HOSPITAL | Age: 77
End: 2022-09-20
Payer: MEDICARE

## 2022-09-20 DIAGNOSIS — Z12.11 SCREENING FOR COLON CANCER: ICD-10-CM

## 2022-09-20 PROCEDURE — 88305 TISSUE EXAM BY PATHOLOGIST: CPT | Mod: 26,,, | Performed by: PATHOLOGY

## 2022-09-20 PROCEDURE — 45380 COLONOSCOPY AND BIOPSY: CPT | Mod: PT,59,, | Performed by: INTERNAL MEDICINE

## 2022-09-20 PROCEDURE — 37000009 HC ANESTHESIA EA ADD 15 MINS: Mod: PO | Performed by: INTERNAL MEDICINE

## 2022-09-20 PROCEDURE — 63600175 PHARM REV CODE 636 W HCPCS: Mod: PO | Performed by: INTERNAL MEDICINE

## 2022-09-20 PROCEDURE — D9220A PRA ANESTHESIA: Mod: PT,CRNA,, | Performed by: NURSE ANESTHETIST, CERTIFIED REGISTERED

## 2022-09-20 PROCEDURE — D9220A PRA ANESTHESIA: Mod: PT,ANES,, | Performed by: ANESTHESIOLOGY

## 2022-09-20 PROCEDURE — 88305 TISSUE EXAM BY PATHOLOGIST: ICD-10-PCS | Mod: 26,,, | Performed by: PATHOLOGY

## 2022-09-20 PROCEDURE — 45380 PR COLONOSCOPY,BIOPSY: ICD-10-PCS | Mod: PT,59,, | Performed by: INTERNAL MEDICINE

## 2022-09-20 PROCEDURE — 25000003 PHARM REV CODE 250: Mod: PO | Performed by: NURSE ANESTHETIST, CERTIFIED REGISTERED

## 2022-09-20 PROCEDURE — 45385 COLONOSCOPY W/LESION REMOVAL: CPT | Mod: PT,,, | Performed by: INTERNAL MEDICINE

## 2022-09-20 PROCEDURE — 63600175 PHARM REV CODE 636 W HCPCS: Mod: PO | Performed by: NURSE ANESTHETIST, CERTIFIED REGISTERED

## 2022-09-20 PROCEDURE — 27201089 HC SNARE, DISP (ANY): Mod: PO | Performed by: INTERNAL MEDICINE

## 2022-09-20 PROCEDURE — 37000008 HC ANESTHESIA 1ST 15 MINUTES: Mod: PO | Performed by: INTERNAL MEDICINE

## 2022-09-20 PROCEDURE — 45385 PR COLONOSCOPY,REMV LESN,SNARE: ICD-10-PCS | Mod: PT,,, | Performed by: INTERNAL MEDICINE

## 2022-09-20 PROCEDURE — 88305 TISSUE EXAM BY PATHOLOGIST: CPT | Mod: 59 | Performed by: PATHOLOGY

## 2022-09-20 PROCEDURE — D9220A PRA ANESTHESIA: ICD-10-PCS | Mod: PT,CRNA,, | Performed by: NURSE ANESTHETIST, CERTIFIED REGISTERED

## 2022-09-20 PROCEDURE — 45385 COLONOSCOPY W/LESION REMOVAL: CPT | Mod: PT,PO | Performed by: INTERNAL MEDICINE

## 2022-09-20 PROCEDURE — 45380 COLONOSCOPY AND BIOPSY: CPT | Mod: PT,59,PO | Performed by: INTERNAL MEDICINE

## 2022-09-20 PROCEDURE — 27201012 HC FORCEPS, HOT/COLD, DISP: Mod: PO | Performed by: INTERNAL MEDICINE

## 2022-09-20 PROCEDURE — D9220A PRA ANESTHESIA: ICD-10-PCS | Mod: PT,ANES,, | Performed by: ANESTHESIOLOGY

## 2022-09-20 RX ORDER — LIDOCAINE HCL/PF 100 MG/5ML
SYRINGE (ML) INTRAVENOUS
Status: DISCONTINUED | OUTPATIENT
Start: 2022-09-20 | End: 2022-09-20

## 2022-09-20 RX ORDER — PROPOFOL 10 MG/ML
VIAL (ML) INTRAVENOUS CONTINUOUS PRN
Status: DISCONTINUED | OUTPATIENT
Start: 2022-09-20 | End: 2022-09-20

## 2022-09-20 RX ORDER — SODIUM CHLORIDE 0.9 % (FLUSH) 0.9 %
10 SYRINGE (ML) INJECTION
Status: DISCONTINUED | OUTPATIENT
Start: 2022-09-20 | End: 2022-09-20 | Stop reason: HOSPADM

## 2022-09-20 RX ORDER — SODIUM CHLORIDE, SODIUM LACTATE, POTASSIUM CHLORIDE, CALCIUM CHLORIDE 600; 310; 30; 20 MG/100ML; MG/100ML; MG/100ML; MG/100ML
INJECTION, SOLUTION INTRAVENOUS CONTINUOUS
Status: DISCONTINUED | OUTPATIENT
Start: 2022-09-20 | End: 2022-09-20 | Stop reason: HOSPADM

## 2022-09-20 RX ORDER — PROPOFOL 10 MG/ML
VIAL (ML) INTRAVENOUS
Status: DISCONTINUED | OUTPATIENT
Start: 2022-09-20 | End: 2022-09-20

## 2022-09-20 RX ADMIN — PROPOFOL 140 MG: 10 INJECTION, EMULSION INTRAVENOUS at 09:09

## 2022-09-20 RX ADMIN — LIDOCAINE HYDROCHLORIDE 100 MG: 20 INJECTION, SOLUTION INTRAVENOUS at 09:09

## 2022-09-20 RX ADMIN — PROPOFOL 150 MCG/KG/MIN: 10 INJECTION, EMULSION INTRAVENOUS at 09:09

## 2022-09-20 RX ADMIN — SODIUM CHLORIDE, SODIUM LACTATE, POTASSIUM CHLORIDE, AND CALCIUM CHLORIDE: .6; .31; .03; .02 INJECTION, SOLUTION INTRAVENOUS at 09:09

## 2022-09-20 NOTE — TRANSFER OF CARE
Anesthesia Transfer of Care Note    Patient: Srinivasan Bernal III    Procedure(s) Performed: Procedure(s) (LRB):  COLONOSCOPY (N/A)    Patient location: PACU    Anesthesia Type: general    Transport from OR: Transported from OR on room air with adequate spontaneous ventilation    Post pain: adequate analgesia    Post assessment: no apparent anesthetic complications and tolerated procedure well    Post vital signs: stable    Level of consciousness: awake and sedated    Nausea/Vomiting: no nausea/vomiting    Complications: none    Transfer of care protocol was followed      Last vitals:   Visit Vitals  BP 90/62 (BP Location: Left arm, Patient Position: Lying)   Pulse 60   Temp 36.6 °C (97.9 °F) (Skin)   Resp 11   Ht 6' (1.829 m)   Wt 76.2 kg (168 lb)   SpO2 98%   BMI 22.78 kg/m²

## 2022-09-20 NOTE — ANESTHESIA POSTPROCEDURE EVALUATION
Anesthesia Post Evaluation    Patient: Srinivasan Bernal III    Procedure(s) Performed: Procedure(s) (LRB):  COLONOSCOPY (N/A)    Final Anesthesia Type: general      Patient location during evaluation: PACU  Patient participation: Yes- Able to Participate  Level of consciousness: awake and alert and oriented  Post-procedure vital signs: reviewed and stable  Pain management: adequate  Airway patency: patent    PONV status at discharge: No PONV  Anesthetic complications: no      Cardiovascular status: blood pressure returned to baseline  Respiratory status: unassisted, spontaneous ventilation and room air  Hydration status: euvolemic  Follow-up not needed.          Vitals Value Taken Time   BP 90/62 09/20/22 1010   Temp  09/20/22 1015   Pulse 60 09/20/22 1010   Resp 11 09/20/22 1010   SpO2 98 % 09/20/22 1010         No case tracking events are documented in the log.      Pain/Chuck Score: No data recorded

## 2022-09-20 NOTE — ANESTHESIA PREPROCEDURE EVALUATION
09/20/2022  Srinivasan Bernal III is a 77 y.o., male.      Pre-op Assessment    I have reviewed the NPO Status.   I have reviewed the Medications.     Review of Systems  Anesthesia Hx:  No problems with previous Anesthesia    Social:  Non-Smoker    Cardiovascular:   Exercise tolerance: good CAD  CABG/stent   Denies Angina.  Denies MERCADO.    Pulmonary:  Pulmonary Normal    Renal/:   Chronic Renal Disease    Hepatic/GI:   Bowel Prep.    Neurological:  Neurology Normal    Endocrine:  Endocrine Normal        Physical Exam  General: Well nourished        Anesthesia Plan  Type of Anesthesia, risks & benefits discussed:    Anesthesia Type: Gen Natural Airway  Intra-op Monitoring Plan: Standard ASA Monitors  Induction:  IV  Informed Consent: Informed consent signed with the Patient and all parties understand the risks and agree with anesthesia plan.  All questions answered. Patient consented to blood products? No  ASA Score: 3    Ready For Surgery From Anesthesia Perspective.     .

## 2022-09-20 NOTE — H&P
[facility] Note    CC: Screening colonoscopy    HPI 77 y.o. male presents for routine screening colonoscoy    Past Medical History:   Diagnosis Date    Cataracts, bilateral     2009, 2013    Coronary artery disease 2004    Hypercholesteremia     Kidney stone 2015    Urerteroscopy    Skin cancer 2015    left ankle/ left ear       Allergies and Medications reviewed     Review of Systems  General ROS: negative for - chills, fever or weight loss  Cardiovascular ROS: no chest pain or dyspnea on exertion  Gastrointestinal ROS: no blood in stool    Physical Examination  /72 (BP Location: Right arm, Patient Position: Sitting)   Pulse 65   Temp 97.9 °F (36.6 °C) (Skin)   Resp 18   Ht 6' (1.829 m)   Wt 76.2 kg (168 lb)   SpO2 98%   BMI 22.78 kg/m²   General appearance: alert, cooperative, no distress  HENT: Normocephalic, atraumatic, neck symmetrical, no nasal discharge, sclera anicteric   Lungs: clear to auscultation bilaterally, symmetric chest wall expansion bilaterally  Heart: regular rate and rhythm without rub; no displacement of the PMI   Abdomen: soft  Extremities: extremities symmetric; no clubbing, cyanosis, or edema        Labs:  Lab Results   Component Value Date    WBC 3.94 12/15/2021    HGB 14.5 12/15/2021    HCT 43.0 12/15/2021    MCV 93 12/15/2021     12/15/2021       c      Assessment:   77 y.o. male presents for routine screening colonoscopy    Plan:  -Proceed to colonoscopy     Adrienne Arbour Carona, MD [facility]  [address], [address]  [place] [address]  Office: [phone]  Fax: [phone]

## 2022-09-20 NOTE — [employer]ATION PATIENT INSTRUCTIONS
Discharge Summary/Instructions after an Endoscopic Procedure  Patient Name: Srinivasan Bernal  Patient MRN: [id number]  Patient YOB: 1945     Tuesday, September 20, 2022  Natasha Sommer MD  Dear patient,  As a result of recent federal legislation (The Federal Cures Act), you may   receive lab or pathology results from your procedure in your [facility]   account before your physician is able to contact you. Your physician or   their representative will relay the results to you with their   recommendations at their soonest availability.  Thank you,  RESTRICTIONS:  During your procedure today, you received medications for sedation.  These   medications may affect your judgment, balance and coordination.  Therefore,   for 24 hours, you have the following restrictions:   - DO NOT drive a car, operate machinery, make legal/financial decisions,   sign important papers or drink alcohol.    ACTIVITY:  Today: no heavy lifting, straining or running due to procedural   sedation/anesthesia.  The following day: return to full activity including work.  DIET:  Eat and drink normally unless instructed otherwise.     TREATMENT FOR COMMON SIDE EFFECTS:  - Mild abdominal pain, nausea, belching, bloating or excessive gas:  rest,   eat lightly and use a heating pad.  - Sore Throat: treat with throat lozenges and/or gargle with warm salt   water.  - Because air was used during the procedure, expelling large amounts of air   from your rectum or belching is normal.  - If a bowel prep was taken, you may not have a bowel movement for 1-3 days.    This is normal.  SYMPTOMS TO WATCH FOR AND REPORT TO YOUR PHYSICIAN:  1. Abdominal pain or bloating, other than gas cramps.  2. Chest pain.  3. Back pain.  4. Signs of infection such as: chills or fever occurring within 24 hours   after the procedure.  5. Rectal bleeding, which would show as bright red, maroon, or black stools.   (A tablespoon of blood from the rectum is not serious,  especially if   hemorrhoids are present.)  6. Vomiting.  7. Weakness or dizziness.  GO DIRECTLY TO THE NEAREST EMERGENCY ROOM IF YOU HAVE ANY OF THE FOLLOWING:      Difficulty breathing              Chills and/or fever over 101 F   Persistent vomiting and/or vomiting blood   Severe abdominal pain   Severe chest pain   Black, tarry stools   Bleeding- more than one tablespoon   Any other symptom or condition that you feel may need urgent attention  Your doctor recommends these additional instructions:  If any biopsies were taken, your doctors clinic will contact you in 1 to 2   weeks with any results.  You are being discharged to home.   You have a contact number available for emergencies.  The signs and symptoms   of potential delayed complications were discussed with you.  You may return   to normal activities tomorrow.  Written discharge instructions were   provided to you.   Resume your previous diet.   Continue your present medications.   We are waiting for your pathology results.   Your physician has recommended a repeat colonoscopy (date to be determined   after pending pathology results are reviewed) for surveillance based on   pathology results.   Return to my office as needed.  For questions, problems or results please call your physician - Natasha Sommer MD at Work:  [phone].  EMERGENCY PHONE NUMBER: [phone], LAB RESULTS: [phone]  IF A COMPLICATION OR EMERGENCY SITUATION ARISES AND YOU ARE UNABLE TO REACH   YOUR PHYSICIAN - GO DIRECTLY TO THE EMERGENCY ROOM.  ___________________________________________  Nurse Signature  ___________________________________________  Patient/Designated Responsible Party Signature  Natasha Sommer MD  9/20/2022 10:11:32 AM  This report has been verified and signed electronically.  Dear patient,  As a result of recent federal legislation (The Federal Cures Act), you may   receive lab or pathology results from your procedure in your  [facility]   account before your physician is able to contact you. Your physician or   their representative will relay the results to you with their   recommendations at their soonest availability.  Thank you.  PROVATION

## 2022-09-21 VITALS
RESPIRATION RATE: 12 BRPM | HEART RATE: 62 BPM | OXYGEN SATURATION: 99 % | DIASTOLIC BLOOD PRESSURE: 72 MMHG | BODY MASS INDEX: 22.75 KG/M2 | TEMPERATURE: 98 F | HEIGHT: 72 IN | WEIGHT: 168 LBS | SYSTOLIC BLOOD PRESSURE: 119 MMHG

## 2022-09-23 LAB
COMMENT: NORMAL
FINAL PATHOLOGIC DIAGNOSIS: NORMAL
GROSS: NORMAL
Lab: NORMAL

## 2022-11-03 ENCOUNTER — TELEPHONE (OUTPATIENT)
Dept: ORTHOPEDICS | Facility: CLINIC | Age: 77
End: 2022-11-03
Payer: MEDICARE

## 2022-11-03 DIAGNOSIS — M25.572 ACUTE LEFT ANKLE PAIN: Primary | ICD-10-CM

## 2022-11-03 NOTE — TELEPHONE ENCOUNTER
----- Message from Fer Dan sent at 11/3/2022 11:52 AM CDT -----  Regarding: needs medicial advice, call pt   Contact: pt   needs medicial advice, call pt

## 2022-11-04 ENCOUNTER — OFFICE VISIT (OUTPATIENT)
Dept: ORTHOPEDICS | Facility: CLINIC | Age: 77
End: 2022-11-04
Payer: MEDICARE

## 2022-11-04 ENCOUNTER — HOSPITAL ENCOUNTER (OUTPATIENT)
Dept: RADIOLOGY | Facility: HOSPITAL | Age: 77
Discharge: HOME | End: 2022-11-04
Attending: ORTHOPAEDIC SURGERY
Payer: MEDICARE

## 2022-11-04 VITALS — HEIGHT: 72 IN | WEIGHT: 168 LBS | BODY MASS INDEX: 22.75 KG/M2

## 2022-11-04 DIAGNOSIS — M25.572 ACUTE LEFT ANKLE PAIN: ICD-10-CM

## 2022-11-04 DIAGNOSIS — M19.072 ARTHRITIS OF LEFT SUBTALAR JOINT: Primary | ICD-10-CM

## 2022-11-04 DIAGNOSIS — M19.279 OSTEOARTHRITIS OF TALONAVICULAR JOINT DUE TO INFLAMMATORY ARTHRITIS: ICD-10-CM

## 2022-11-04 DIAGNOSIS — M19.90 OSTEOARTHRITIS OF TALONAVICULAR JOINT DUE TO INFLAMMATORY ARTHRITIS: ICD-10-CM

## 2022-11-04 PROCEDURE — 1160F RVW MEDS BY RX/DR IN RCRD: CPT | Mod: CPTII,S$GLB,, | Performed by: ORTHOPAEDIC SURGERY

## 2022-11-04 PROCEDURE — 73630 X-RAY EXAM OF FOOT: CPT | Mod: 26,LT,, | Performed by: RADIOLOGY

## 2022-11-04 PROCEDURE — 3288F FALL RISK ASSESSMENT DOCD: CPT | Mod: CPTII,S$GLB,, | Performed by: ORTHOPAEDIC SURGERY

## 2022-11-04 PROCEDURE — 1125F PR PAIN SEVERITY QUANTIFIED, PAIN PRESENT: ICD-10-PCS | Mod: CPTII,S$GLB,, | Performed by: ORTHOPAEDIC SURGERY

## 2022-11-04 PROCEDURE — 1101F PT FALLS ASSESS-DOCD LE1/YR: CPT | Mod: CPTII,S$GLB,, | Performed by: ORTHOPAEDIC SURGERY

## 2022-11-04 PROCEDURE — 99214 PR OFFICE/OUTPT VISIT, EST, LEVL IV, 30-39 MIN: ICD-10-PCS | Mod: S$GLB,,, | Performed by: ORTHOPAEDIC SURGERY

## 2022-11-04 PROCEDURE — 73630 X-RAY EXAM OF FOOT: CPT | Mod: TC,PO,LT

## 2022-11-04 PROCEDURE — 73630 XR FOOT COMPLETE 3 VIEW LEFT: ICD-10-PCS | Mod: 26,LT,, | Performed by: RADIOLOGY

## 2022-11-04 PROCEDURE — 1159F MED LIST DOCD IN RCRD: CPT | Mod: CPTII,S$GLB,, | Performed by: ORTHOPAEDIC SURGERY

## 2022-11-04 PROCEDURE — 1159F PR MEDICATION LIST DOCUMENTED IN MEDICAL RECORD: ICD-10-PCS | Mod: CPTII,S$GLB,, | Performed by: ORTHOPAEDIC SURGERY

## 2022-11-04 PROCEDURE — 99999 PR PBB SHADOW E&M-EST. PATIENT-LVL III: CPT | Mod: PBBFAC,,, | Performed by: ORTHOPAEDIC SURGERY

## 2022-11-04 PROCEDURE — 1101F PR PT FALLS ASSESS DOC 0-1 FALLS W/OUT INJ PAST YR: ICD-10-PCS | Mod: CPTII,S$GLB,, | Performed by: ORTHOPAEDIC SURGERY

## 2022-11-04 PROCEDURE — 3288F PR FALLS RISK ASSESSMENT DOCUMENTED: ICD-10-PCS | Mod: CPTII,S$GLB,, | Performed by: ORTHOPAEDIC SURGERY

## 2022-11-04 PROCEDURE — 73610 X-RAY EXAM OF ANKLE: CPT | Mod: TC,PO,LT

## 2022-11-04 PROCEDURE — 1125F AMNT PAIN NOTED PAIN PRSNT: CPT | Mod: CPTII,S$GLB,, | Performed by: ORTHOPAEDIC SURGERY

## 2022-11-04 PROCEDURE — 99214 OFFICE O/P EST MOD 30 MIN: CPT | Mod: S$GLB,,, | Performed by: ORTHOPAEDIC SURGERY

## 2022-11-04 PROCEDURE — 73610 XR ANKLE COMPLETE 3 VIEW LEFT: ICD-10-PCS | Mod: 26,LT,, | Performed by: RADIOLOGY

## 2022-11-04 PROCEDURE — 1160F PR REVIEW ALL MEDS BY PRESCRIBER/CLIN PHARMACIST DOCUMENTED: ICD-10-PCS | Mod: CPTII,S$GLB,, | Performed by: ORTHOPAEDIC SURGERY

## 2022-11-04 PROCEDURE — 73610 X-RAY EXAM OF ANKLE: CPT | Mod: 26,LT,, | Performed by: RADIOLOGY

## 2022-11-04 PROCEDURE — 99999 PR PBB SHADOW E&M-EST. PATIENT-LVL III: ICD-10-PCS | Mod: PBBFAC,,, | Performed by: ORTHOPAEDIC SURGERY

## 2022-11-04 RX ORDER — METHYLPREDNISOLONE 4 MG/1
TABLET ORAL
Qty: 21 EACH | Refills: 0 | Status: SHIPPED | OUTPATIENT
Start: 2022-11-04 | End: 2022-11-25

## 2022-11-13 NOTE — PROGRESS NOTES
Status/Diagnosis: Left pantalar arthritis; Mild cavovarus.   Date of Surgery: Left tibiotalar and distal tib-fib arthrodesis (2005)  Date of Injury: none; DOO: October 2022  Return visit: PRN  X-rays on Return: pending patient complaint    Chief Complaint:   Chief Complaint   Patient presents with    Left Ankle - Pain     Present History:  Srinivasan Bernal III is a 77 y.o. male who presents via referral from Dr. Gio Goldberg.  Patient has done well status post left ankle arthrodesis as outlined above.  Presents today with a 2 week history of increased pain localized to the medial/dorsal medial midfoot.  Denies any history of injury.  Denies any numbness or tingling.  No history of immobilization, physical therapy, etc..  Has been taking over-the-counter oral NSAIDs intermittently as needed for pain with moderate relief.  Past medical history significant for CAD status post stent placement; denies tobacco use.      Past Medical History:   Diagnosis Date    Cataracts, bilateral     2009, 2013    Coronary artery disease 2004    Hypercholesteremia     Kidney stone 2015    Urerteroscopy    Skin cancer 2015    left ankle/ left ear       Past Surgical History:   Procedure Laterality Date    ANKLE FUSION      CIRCUMCISION      COLONOSCOPY N/A 9/20/2022    Procedure: COLONOSCOPY;  Surgeon: Natasha Sommer MD;  Location: [facility] END[facility];  Service: Endoscopy;  Laterality: N/A;    FOOT SURGERY      HERNIA REPAIR Left 2008    KNEE ARTHROSCOPY Left 2010    KNEE ARTHROSCOPY Right 2010    KNEE SURGERY Left 2010    partial replacement    KNEE SURGERY Right 2012    replacement    MOUTH SURGERY      TOE SURGERY      TONSILLECTOMY         Current Outpatient Medications   Medication Sig    aspirin (ECOTRIN) 81 MG EC tablet Take 81 mg by mouth once daily.    atorvastatin (LIPITOR) 80 MG tablet Take 1 tablet (80 mg total) by mouth once daily.    ezetimibe (ZETIA) 10 mg tablet Take 1 tablet (10 mg total) by mouth once daily.    ibuprofen  (ADVIL,MOTRIN) 200 MG tablet Take 200 mg by mouth every 6 (six) hours as needed for Pain.    methocarbamoL (ROBAXIN) 750 MG Tab Take 1 tablet (750 mg total) by mouth 4 (four) times daily as needed.    perindopril erbumine (ACEON) 4 mg tablet Take 1 tablet (4 mg total) by mouth once daily.    methylPREDNISolone (MEDROL DOSEPACK) 4 mg tablet use as directed    tadalafiL (CIALIS) 20 MG Tab Take 1 tablet (20 mg total) by mouth once daily. (Patient not taking: Reported on 7/6/2022)     No current facility-administered medications for this visit.       Review of patient's allergies indicates:  No Known Allergies    Family History   Problem Relation Age of Onset    Heart disease Father     Dementia Mother        Social History     Socioeconomic History    Marital status:    Tobacco Use    Smoking status: Some Days     Types: Cigars    Smokeless tobacco: Never   Substance and Sexual Activity    Alcohol use: Yes     Comment: rarely    Drug use: No    Sexual activity: Yes       Physical exam:  There were no vitals filed for this visit.  Body mass index is 22.78 kg/m².  General: In no apparent distress; well developed and well nourished.  HEENT: normocephalic; atraumatic.  Cardiovascular: regular rate.  Respiratory: no increased work of breathing.  Musculoskeletal:   Gait: mild antalgic  Inspection:  Previous surgical scars about the left ankle are well healed.  Solid fusion on stress tibiotalar joint.  Patient with significant tenderness localized to the talonavicular joint with palpable dorsal medial osteophytes.  Mild-to-moderate tenderness at the subtalar joint.  Mild to mild hindfoot varus that is rigid with attempted passive correction.  No correction with Harman block testing.  No laxity with anterior drawer testing.  Silfverskiold: n/a  Alignment:  Knee: neutral               Ankle: neutral              Hindfoot: varus              Forefoot: neutral   Sensation:              SILT distally  ROM:               Ankle:none              Subtalar: little to none  Pulses: 2+ DP/PT pulses.                   Imaging Studies/Outside documentation:  I have ordered/reviewed/interpreted the following images/outside documentation:  1. Weight bearing 3-views of Left foot and ankle:  No acute bony abnormality noted.  Evidence of previous tibiotalar and distal tib-fib arthrodesis, well healed.  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 Multiple large dorsal osteophytes. Significant subtalar DJD.        Assessment:  Srinivasan Bernal III is a 77 y.o. male with Left pantalar arthritis; Mild cavovarus.      Plan:   Clinical and radiographic findings were discussed.  Operative versus nonoperative options were described.  Surgical intervention would not be limited to pantalar arthrodesis.  Patient would like to defer surgical intervention if at all possible.  Unable to perform corticosteroid injection at the talonavicular joint due to severity of arthritis which is patient's primary area of complaint.  Will prescribe a Medrol Dosepak and have patient fitted for a tall boot to be worn for the next several weeks.  May wean out of the boot as pain allows.  Patient voiced understanding.  All questions were answered.  He will call regarding future follow-up should symptoms persist or worsen.    This note was created using voice recognition software and may contain grammatical errors.       5/5

## 2023-01-04 ENCOUNTER — LAB VISIT (OUTPATIENT)
Dept: LAB | Facility: HOSPITAL | Age: 78
End: 2023-01-04
Attending: INTERNAL MEDICINE
Payer: MEDICARE

## 2023-01-04 DIAGNOSIS — I10 ESSENTIAL HYPERTENSION: ICD-10-CM

## 2023-01-04 DIAGNOSIS — Z79.899 ENCOUNTER FOR LONG-TERM (CURRENT) USE OF MEDICATIONS: ICD-10-CM

## 2023-01-04 DIAGNOSIS — I25.10 CORONARY ARTERY DISEASE: ICD-10-CM

## 2023-01-04 DIAGNOSIS — Z12.5 ENCOUNTER FOR SCREENING FOR MALIGNANT NEOPLASM OF PROSTATE: ICD-10-CM

## 2023-01-04 DIAGNOSIS — E78.5 DYSLIPIDEMIA: ICD-10-CM

## 2023-01-04 LAB
ALBUMIN SERPL BCP-MCNC: 3.6 G/DL (ref 3.5–5.2)
ALP SERPL-CCNC: 86 U/L (ref 55–135)
ALT SERPL W/O P-5'-P-CCNC: 23 U/L (ref 10–44)
ANION GAP SERPL CALC-SCNC: 5 MMOL/L (ref 8–16)
AST SERPL-CCNC: 26 U/L (ref 10–40)
BASOPHILS # BLD AUTO: 0.04 K/UL (ref 0–0.2)
BASOPHILS NFR BLD: 1 % (ref 0–1.9)
BILIRUB SERPL-MCNC: 0.9 MG/DL (ref 0.1–1)
BUN SERPL-MCNC: 15 MG/DL (ref 8–23)
CALCIUM SERPL-MCNC: 9.5 MG/DL (ref 8.7–10.5)
CHLORIDE SERPL-SCNC: 111 MMOL/L (ref 95–110)
CHOLEST SERPL-MCNC: 135 MG/DL (ref 120–199)
CHOLEST/HDLC SERPL: 2.5 {RATIO} (ref 2–5)
CO2 SERPL-SCNC: 28 MMOL/L (ref 23–29)
COMPLEXED PSA SERPL-MCNC: 0.9 NG/ML (ref 0–4)
CREAT SERPL-MCNC: 0.9 MG/DL (ref 0.5–1.4)
DIFFERENTIAL METHOD: ABNORMAL
EOSINOPHIL # BLD AUTO: 0.1 K/UL (ref 0–0.5)
EOSINOPHIL NFR BLD: 2.9 % (ref 0–8)
ERYTHROCYTE [DISTWIDTH] IN BLOOD BY AUTOMATED COUNT: 12.8 % (ref 11.5–14.5)
EST. GFR  (NO RACE VARIABLE): >60 ML/MIN/1.73 M^2
GLUCOSE SERPL-MCNC: 88 MG/DL (ref 70–110)
HCT VFR BLD AUTO: 41 % (ref 40–54)
HDLC SERPL-MCNC: 53 MG/DL (ref 40–75)
HDLC SERPL: 39.3 % (ref 20–50)
HGB BLD-MCNC: 13.6 G/DL (ref 14–18)
IMM GRANULOCYTES # BLD AUTO: 0.01 K/UL (ref 0–0.04)
IMM GRANULOCYTES NFR BLD AUTO: 0.3 % (ref 0–0.5)
LDLC SERPL CALC-MCNC: 72.6 MG/DL (ref 63–159)
LYMPHOCYTES # BLD AUTO: 0.9 K/UL (ref 1–4.8)
LYMPHOCYTES NFR BLD: 24.5 % (ref 18–48)
MCH RBC QN AUTO: 30.7 PG (ref 27–31)
MCHC RBC AUTO-ENTMCNC: 33.2 G/DL (ref 32–36)
MCV RBC AUTO: 93 FL (ref 82–98)
MONOCYTES # BLD AUTO: 0.3 K/UL (ref 0.3–1)
MONOCYTES NFR BLD: 8.9 % (ref 4–15)
NEUTROPHILS # BLD AUTO: 2.4 K/UL (ref 1.8–7.7)
NEUTROPHILS NFR BLD: 62.4 % (ref 38–73)
NONHDLC SERPL-MCNC: 82 MG/DL
NRBC BLD-RTO: 0 /100 WBC
PLATELET # BLD AUTO: 193 K/UL (ref 150–450)
PMV BLD AUTO: 9.9 FL (ref 9.2–12.9)
POTASSIUM SERPL-SCNC: 4.1 MMOL/L (ref 3.5–5.1)
PROT SERPL-MCNC: 6.2 G/DL (ref 6–8.4)
RBC # BLD AUTO: 4.43 M/UL (ref 4.6–6.2)
SODIUM SERPL-SCNC: 144 MMOL/L (ref 136–145)
TRIGL SERPL-MCNC: 47 MG/DL (ref 30–150)
WBC # BLD AUTO: 3.84 K/UL (ref 3.9–12.7)

## 2023-01-04 PROCEDURE — 80053 COMPREHEN METABOLIC PANEL: CPT | Performed by: INTERNAL MEDICINE

## 2023-01-04 PROCEDURE — 84153 ASSAY OF PSA TOTAL: CPT | Performed by: INTERNAL MEDICINE

## 2023-01-04 PROCEDURE — 85025 COMPLETE CBC W/AUTO DIFF WBC: CPT | Performed by: INTERNAL MEDICINE

## 2023-01-04 PROCEDURE — 80061 LIPID PANEL: CPT | Performed by: INTERNAL MEDICINE

## 2023-01-04 PROCEDURE — 36415 COLL VENOUS BLD VENIPUNCTURE: CPT | Mod: PO | Performed by: INTERNAL MEDICINE

## 2023-01-09 ENCOUNTER — OFFICE VISIT (OUTPATIENT)
Dept: FAMILY MEDICINE | Facility: CLINIC | Age: 78
End: 2023-01-09
Payer: MEDICARE

## 2023-01-09 VITALS
HEIGHT: 72 IN | BODY MASS INDEX: 23.92 KG/M2 | DIASTOLIC BLOOD PRESSURE: 74 MMHG | WEIGHT: 176.56 LBS | SYSTOLIC BLOOD PRESSURE: 116 MMHG | TEMPERATURE: 98 F | OXYGEN SATURATION: 97 % | HEART RATE: 72 BPM

## 2023-01-09 DIAGNOSIS — I10 ESSENTIAL HYPERTENSION: Primary | ICD-10-CM

## 2023-01-09 DIAGNOSIS — E78.5 DYSLIPIDEMIA: ICD-10-CM

## 2023-01-09 DIAGNOSIS — D72.829 LEUKOCYTOSIS, UNSPECIFIED TYPE: ICD-10-CM

## 2023-01-09 DIAGNOSIS — H02.9 EYELID LESION: ICD-10-CM

## 2023-01-09 PROCEDURE — 99214 PR OFFICE/OUTPT VISIT, EST, LEVL IV, 30-39 MIN: ICD-10-PCS | Mod: S$GLB,,, | Performed by: INTERNAL MEDICINE

## 2023-01-09 PROCEDURE — 99214 OFFICE O/P EST MOD 30 MIN: CPT | Mod: S$GLB,,, | Performed by: INTERNAL MEDICINE

## 2023-01-09 PROCEDURE — 1159F PR MEDICATION LIST DOCUMENTED IN MEDICAL RECORD: ICD-10-PCS | Mod: CPTII,S$GLB,, | Performed by: INTERNAL MEDICINE

## 2023-01-09 PROCEDURE — 3288F PR FALLS RISK ASSESSMENT DOCUMENTED: ICD-10-PCS | Mod: CPTII,S$GLB,, | Performed by: INTERNAL MEDICINE

## 2023-01-09 PROCEDURE — 1126F AMNT PAIN NOTED NONE PRSNT: CPT | Mod: CPTII,S$GLB,, | Performed by: INTERNAL MEDICINE

## 2023-01-09 PROCEDURE — 1101F PT FALLS ASSESS-DOCD LE1/YR: CPT | Mod: CPTII,S$GLB,, | Performed by: INTERNAL MEDICINE

## 2023-01-09 PROCEDURE — 1101F PR PT FALLS ASSESS DOC 0-1 FALLS W/OUT INJ PAST YR: ICD-10-PCS | Mod: CPTII,S$GLB,, | Performed by: INTERNAL MEDICINE

## 2023-01-09 PROCEDURE — 3074F SYST BP LT 130 MM HG: CPT | Mod: CPTII,S$GLB,, | Performed by: INTERNAL MEDICINE

## 2023-01-09 PROCEDURE — 3078F PR MOST RECENT DIASTOLIC BLOOD PRESSURE < 80 MM HG: ICD-10-PCS | Mod: CPTII,S$GLB,, | Performed by: INTERNAL MEDICINE

## 2023-01-09 PROCEDURE — 99999 PR PBB SHADOW E&M-EST. PATIENT-LVL III: CPT | Mod: PBBFAC,,, | Performed by: INTERNAL MEDICINE

## 2023-01-09 PROCEDURE — 99999 PR PBB SHADOW E&M-EST. PATIENT-LVL III: ICD-10-PCS | Mod: PBBFAC,,, | Performed by: INTERNAL MEDICINE

## 2023-01-09 PROCEDURE — 1160F PR REVIEW ALL MEDS BY PRESCRIBER/CLIN PHARMACIST DOCUMENTED: ICD-10-PCS | Mod: CPTII,S$GLB,, | Performed by: INTERNAL MEDICINE

## 2023-01-09 PROCEDURE — 1126F PR PAIN SEVERITY QUANTIFIED, NO PAIN PRESENT: ICD-10-PCS | Mod: CPTII,S$GLB,, | Performed by: INTERNAL MEDICINE

## 2023-01-09 PROCEDURE — 1159F MED LIST DOCD IN RCRD: CPT | Mod: CPTII,S$GLB,, | Performed by: INTERNAL MEDICINE

## 2023-01-09 PROCEDURE — 1160F RVW MEDS BY RX/DR IN RCRD: CPT | Mod: CPTII,S$GLB,, | Performed by: INTERNAL MEDICINE

## 2023-01-09 PROCEDURE — 3078F DIAST BP <80 MM HG: CPT | Mod: CPTII,S$GLB,, | Performed by: INTERNAL MEDICINE

## 2023-01-09 PROCEDURE — 3074F PR MOST RECENT SYSTOLIC BLOOD PRESSURE < 130 MM HG: ICD-10-PCS | Mod: CPTII,S$GLB,, | Performed by: INTERNAL MEDICINE

## 2023-01-09 PROCEDURE — 3288F FALL RISK ASSESSMENT DOCD: CPT | Mod: CPTII,S$GLB,, | Performed by: INTERNAL MEDICINE

## 2023-01-09 NOTE — PROGRESS NOTES
Subjective:       Patient ID: Srinivasan Bernal III is a 77 y.o. male.  Chief Complaint: Hypertension     HPI      Low WBC - mild; close to trend.    mild memory changes.  Mostly with names and phrases.  Stable       CAD s/p 2 stents. No chest pain.  Dr Hernandez.   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 Essential hypertension    2. Dyslipidemia    3. Eyelid lesion    4. Leukocytosis, unspecified type          Plan:       Essential hypertension  -     Comprehensive Metabolic Panel; Future; Expected date: 07/08/2023    Dyslipidemia  -     Lipid Panel; Future; Expected date: 07/08/2023    Eyelid lesion    Leukocytosis, unspecified type  -     CBC Auto Differential; Future; Expected date: 07/08/2023            Continue current management and monitor.  Other diagnoses were reviewed and found stable and will continue to monitor.  Counseled on regular exercise, maintenance of a healthy weight, balanced diet rich in fruits/vegetables and lean protein, and avoidance of unhealthy habits like smoking and excessive alcohol intake.   Also, counseled on importance of being compliant with medication, health appointments, diet and exercise.     Follow up in about 26 weeks (around 7/10/2023).      Medication List with Changes/Refills   Current Medications    ASPIRIN (ECOTRIN) 81 MG EC TABLET    Take 81 mg by mouth once daily.    ATORVASTATIN (LIPITOR) 80 MG TABLET    Take 1 tablet (80 mg total) by mouth once daily.    EZETIMIBE (ZETIA) 10 MG TABLET    Take 1 tablet (10 mg total) by mouth once daily.    IBUPROFEN (ADVIL,MOTRIN) 200 MG TABLET    Take 200 mg by mouth every 6 (six) hours as needed for Pain.    METHOCARBAMOL (ROBAXIN) 750 MG TAB    Take 1 tablet (750 mg total) by mouth 4 (four) times daily as needed.     PERINDOPRIL ERBUMINE (ACEON) 4 MG TABLET    Take 1 tablet (4 mg total) by mouth once daily.    TADALAFIL (CIALIS) 20 MG TAB    Take 1 tablet (20 mg total) by mouth once daily.       BP Readings from Last 3 Encounters:   01/09/23 116/74   09/20/22 119/72   07/06/22 118/74     No results found for: HGBA1C  No results found for: TSH  Lab Results   Component Value Date    LDLCALC 72.6 01/04/2023    LDLCALC 63.2 06/29/2022    LDLCALC 64.0 12/15/2021     Lab Results   Component Value Date    TRIG 47 01/04/2023    TRIG 54 06/29/2022    TRIG 55 12/15/2021     Wt Readings from Last 3 Encounters:   01/09/23 80.1 kg (176 lb 9.4 oz)   11/04/22 76.2 kg (168 lb)   09/15/22 76.2 kg (168 lb)     Lab Results   Component Value Date    HGB 13.6 (L) 01/04/2023    HCT 41.0 01/04/2023    WBC 3.84 (L) 01/04/2023    ALT 23 01/04/2023    AST 26 01/04/2023     01/04/2023    K 4.1 01/04/2023    CREATININE 0.9 01/04/2023    PSA 0.90 01/04/2023           Review of Systems   Constitutional:  Negative for diaphoresis and fever.   HENT:  Negative for drooling and nosebleeds.    Eyes:  Negative for discharge and redness.   Respiratory:  Negative for apnea and choking.    Cardiovascular:  Negative for chest pain and palpitations.   Gastrointestinal:  Negative for abdominal pain and nausea.   Skin:  Negative for color change.   Neurological:  Negative for seizures and syncope.   Psychiatric/Behavioral:  Negative for behavioral problems.          Objective:      Vitals:    01/09/23 1112   BP: 116/74   Pulse: 72   Temp: 97.9 °F (36.6 °C)     Physical Exam  Vitals reviewed.   Constitutional:       Appearance: Normal appearance.   Eyes:      Conjunctiva/sclera: Conjunctivae normal.   Cardiovascular:      Rate and Rhythm: Normal rate.   Pulmonary:      Effort: Pulmonary effort is normal.      Breath sounds: Normal breath sounds.   Musculoskeletal:      Cervical back: Normal range of motion.      Comments: Normal ROM bilateral    Skin:     General:  Skin is warm and dry.   Neurological:      Mental Status: He is alert.      Cranial Nerves: Cranial nerve deficit: grossly intact.   Psychiatric:      Comments: Alert and orientated

## 2023-03-27 ENCOUNTER — OFFICE VISIT (OUTPATIENT)
Dept: PAIN MEDICINE | Facility: CLINIC | Age: 78
End: 2023-03-27
Payer: MEDICARE

## 2023-03-27 ENCOUNTER — TELEPHONE (OUTPATIENT)
Dept: PAIN MEDICINE | Facility: CLINIC | Age: 78
End: 2023-03-27
Payer: MEDICARE

## 2023-03-27 VITALS
WEIGHT: 170.19 LBS | HEIGHT: 72 IN | HEART RATE: 71 BPM | SYSTOLIC BLOOD PRESSURE: 111 MMHG | DIASTOLIC BLOOD PRESSURE: 66 MMHG | BODY MASS INDEX: 23.05 KG/M2

## 2023-03-27 DIAGNOSIS — M54.50 ACUTE RIGHT-SIDED LOW BACK PAIN WITHOUT SCIATICA: Primary | ICD-10-CM

## 2023-03-27 DIAGNOSIS — M43.16 SPONDYLOLISTHESIS OF LUMBAR REGION: ICD-10-CM

## 2023-03-27 PROCEDURE — 99203 PR OFFICE/OUTPT VISIT, NEW, LEVL III, 30-44 MIN: ICD-10-PCS | Mod: HCNC,S$GLB,, | Performed by: PHYSICIAN ASSISTANT

## 2023-03-27 PROCEDURE — 3288F FALL RISK ASSESSMENT DOCD: CPT | Mod: HCNC,CPTII,S$GLB, | Performed by: PHYSICIAN ASSISTANT

## 2023-03-27 PROCEDURE — 1101F PT FALLS ASSESS-DOCD LE1/YR: CPT | Mod: HCNC,CPTII,S$GLB, | Performed by: PHYSICIAN ASSISTANT

## 2023-03-27 PROCEDURE — 99999 PR PBB SHADOW E&M-EST. PATIENT-LVL III: ICD-10-PCS | Mod: PBBFAC,HCNC,, | Performed by: PHYSICIAN ASSISTANT

## 2023-03-27 PROCEDURE — 1159F PR MEDICATION LIST DOCUMENTED IN MEDICAL RECORD: ICD-10-PCS | Mod: HCNC,CPTII,S$GLB, | Performed by: PHYSICIAN ASSISTANT

## 2023-03-27 PROCEDURE — 1126F PR PAIN SEVERITY QUANTIFIED, NO PAIN PRESENT: ICD-10-PCS | Mod: HCNC,CPTII,S$GLB, | Performed by: PHYSICIAN ASSISTANT

## 2023-03-27 PROCEDURE — 99203 OFFICE O/P NEW LOW 30 MIN: CPT | Mod: HCNC,S$GLB,, | Performed by: PHYSICIAN ASSISTANT

## 2023-03-27 PROCEDURE — 3288F PR FALLS RISK ASSESSMENT DOCUMENTED: ICD-10-PCS | Mod: HCNC,CPTII,S$GLB, | Performed by: PHYSICIAN ASSISTANT

## 2023-03-27 PROCEDURE — 1126F AMNT PAIN NOTED NONE PRSNT: CPT | Mod: HCNC,CPTII,S$GLB, | Performed by: PHYSICIAN ASSISTANT

## 2023-03-27 PROCEDURE — 1160F PR REVIEW ALL MEDS BY PRESCRIBER/CLIN PHARMACIST DOCUMENTED: ICD-10-PCS | Mod: HCNC,CPTII,S$GLB, | Performed by: PHYSICIAN ASSISTANT

## 2023-03-27 PROCEDURE — 99999 PR PBB SHADOW E&M-EST. PATIENT-LVL III: CPT | Mod: PBBFAC,HCNC,, | Performed by: PHYSICIAN ASSISTANT

## 2023-03-27 PROCEDURE — 3078F PR MOST RECENT DIASTOLIC BLOOD PRESSURE < 80 MM HG: ICD-10-PCS | Mod: HCNC,CPTII,S$GLB, | Performed by: PHYSICIAN ASSISTANT

## 2023-03-27 PROCEDURE — 1159F MED LIST DOCD IN RCRD: CPT | Mod: HCNC,CPTII,S$GLB, | Performed by: PHYSICIAN ASSISTANT

## 2023-03-27 PROCEDURE — 3074F SYST BP LT 130 MM HG: CPT | Mod: HCNC,CPTII,S$GLB, | Performed by: PHYSICIAN ASSISTANT

## 2023-03-27 PROCEDURE — 1101F PR PT FALLS ASSESS DOC 0-1 FALLS W/OUT INJ PAST YR: ICD-10-PCS | Mod: HCNC,CPTII,S$GLB, | Performed by: PHYSICIAN ASSISTANT

## 2023-03-27 PROCEDURE — 3078F DIAST BP <80 MM HG: CPT | Mod: HCNC,CPTII,S$GLB, | Performed by: PHYSICIAN ASSISTANT

## 2023-03-27 PROCEDURE — 3074F PR MOST RECENT SYSTOLIC BLOOD PRESSURE < 130 MM HG: ICD-10-PCS | Mod: HCNC,CPTII,S$GLB, | Performed by: PHYSICIAN ASSISTANT

## 2023-03-27 PROCEDURE — 1160F RVW MEDS BY RX/DR IN RCRD: CPT | Mod: HCNC,CPTII,S$GLB, | Performed by: PHYSICIAN ASSISTANT

## 2023-03-27 RX ORDER — METHYLPREDNISOLONE 4 MG/1
TABLET ORAL
Qty: 1 EACH | Refills: 0 | Status: SHIPPED | OUTPATIENT
Start: 2023-03-27 | End: 2023-04-17

## 2023-03-27 NOTE — PROGRESS NOTES
[facility] New Patient Evaluation      Referred by: Cainrefbess Self    PCP:   Ray Anna MD    CC:   Chief Complaint   Patient presents with    Low-back Pain     Pain is radiating into right hip, worse with walking.      No flowsheet data found.      HPI:   Srinivasan Bernal III is a 77 y.o. male with history of CAD presents with lower back pain.  He has had similar pain in 2007 and 2011 with benefit from PT in the past.  Current pain started 3/22/23 without triggering event.  He has pain in the right lower lumbar region/ buttock.  No radicular leg pain, numbness or tignling.  Pain is worse with walking and improves with sitting.  No pain with laying down.  Has tried ibuprofen.    He had MRI and xray in the past showing right L5 pars deffect and anterolisthesis of L5 on S1.      Past and current medications:  Antineuropathics:  NSAIDs:  ibuprofen  Antidepressants:  Muscle relaxers:  Opioids:  Antiplatelets/Anticoagulants:  ASA  Others:    Physical therapy/ Chiropractic care:  PT in the past    Pain Intervention History:  none    Past Spine Surgical History:  none      History:    Current Outpatient Medications:     aspirin (ECOTRIN) 81 MG EC tablet, Take 81 mg by mouth once daily., Disp: , Rfl:     atorvastatin (LIPITOR) 80 MG tablet, Take 1 tablet (80 mg total) by mouth once daily., Disp: 90 tablet, Rfl: 3    ezetimibe (ZETIA) 10 mg tablet, Take 1 tablet (10 mg total) by mouth once daily., Disp: 90 tablet, Rfl: 3    ibuprofen (ADVIL,MOTRIN) 200 MG tablet, Take 200 mg by mouth every 6 (six) hours as needed for Pain., Disp: , Rfl:     perindopril erbumine (ACEON) 4 mg tablet, Take 1 tablet (4 mg total) by mouth once daily., Disp: 90 tablet, Rfl: 3    methocarbamoL (ROBAXIN) 750 MG Tab, Take 1 tablet (750 mg total) by mouth 4 (four) times daily as needed. (Patient not taking: Reported on 1/9/2023), Disp: 44 tablet, Rfl: 3    methylPREDNISolone (MEDROL, PROSPER,) 4 mg tablet, use as directed, Disp: 1 each,  Rfl: 0    tadalafiL (CIALIS) 20 MG Tab, Take 1 tablet (20 mg total) by mouth once daily. (Patient not taking: Reported on 7/6/2022), Disp: 30 tablet, Rfl: 11    Past Medical History:   Diagnosis Date    Cataracts, bilateral     2009, 2013    Coronary artery disease 2004    Hypercholesteremia     Kidney stone 2015    Urerteroscopy    Skin cancer 2015    left ankle/ left ear       Past Surgical History:   Procedure Laterality Date    ANKLE FUSION      CIRCUMCISION      COLONOSCOPY N/A 9/20/2022    Procedure: COLONOSCOPY;  Surgeon: Natasha Sommer MD;  Location: [facility];  Service: Endoscopy;  Laterality: N/A;    FOOT SURGERY      HERNIA REPAIR Left 2008    KNEE ARTHROSCOPY Left 2010    KNEE ARTHROSCOPY Right 2010    KNEE SURGERY Left 2010    partial replacement    KNEE SURGERY Right 2012    replacement    MOUTH SURGERY      TOE SURGERY      TONSILLECTOMY         Family History   Problem Relation Age of Onset    Heart disease Father     Dementia Mother        Social History     Socioeconomic History    Marital status:    Tobacco Use    Smoking status: Some Days     Types: Cigars    Smokeless tobacco: Never   Substance and Sexual Activity    Alcohol use: Yes     Comment: rarely    Drug use: No    Sexual activity: Yes     Social Determinants of Health     Financial Resource Strain: Low Risk     Difficulty of Paying Living Expenses: Not hard at all   Food Insecurity: No Food Insecurity    Worried About Running Out of Food in the Last Year: Never true    Ran Out of Food in the Last Year: Never true   Transportation Needs: No Transportation Needs    Lack of Transportation (Medical): No    Lack of Transportation (Non-Medical): No   Physical Activity: Sufficiently Active    Days of Exercise per Week: 7 days    Minutes of Exercise per Session: 100 min   Stress: No Stress Concern Present    Feeling of Stress : Not at all   Social Connections: Unknown    Frequency of Communication with Friends and Family: Three times  a week    Frequency of Social Gatherings with Friends and Family: Three times a week    Active Member of Clubs or Organizations: Yes    Attends Club or Organization Meetings: More than 4 times per year    Marital Status:    Housing Stability: Low Risk     Unable to Pay for Housing in the Last Year: No    Number of Places Lived in the Last Year: 1    Unstable Housing in the Last Year: No       Review of patient's allergies indicates:  No Known Allergies    Review of Systems:  Right low back/ buttock pain.  Balance of review of systems is negative.    Physical Exam:  Vitals:    03/27/23 1037   BP: 111/66   Pulse: 71   Weight: 77.2 kg (170 lb 3.1 oz)   Height: 6' (1.829 m)   PainSc: 0-No pain     Body mass index is 23.08 kg/m².    Gen: NAD  Psych: mood appropriate for given condition  HEENT: eyes anicteric   CV: RRR, 2+ radial pulse  HEENT: anicteric   Respiratory: non-labored, no signs of respiratory distress  Abd: non-distended  Skin: warm, dry and intact.  Gait: Able to heel walk, toe walk. No antalgic gait.     Coordination:   Romberg: negative  Finger to nose coordination: normal  Heel to shin coordination: normal  Tandem walking coordination: normal    Cervical spine:   ROM is full in flexion, extension and lateral rotation without increased pain.  Spurling's maneuver causes no neck pain to either side.  Myofascial exam: No Tenderness to palpation across cervical paraspinous region bilaterally.    Lumbar spine:   ROM is full with flexion extension and oblique extension with no increased pain.    Michoacano's test causes no increased pain on either side.    Supine straight leg raise is negative bilaterally.    Internal and external rotation of the hip causes no increased pain on either side.  Myofascial exam: No tenderness to palpation across lumbar paraspinous muscles. No tenderness to palpation over the bilateral greater trochanters and bilateral SI joint    Sensory:  Intact and symmetrical to light touch in  C4-T1 dermatomes bilaterally. Intact and symmetrical to light touch in L1-S1 dermatomes bilaterally.    Motor:    Right Left   C4 Shoulder Abduction  5  5   C5 Elbow Flexion    5  5   C6 Wrist Extension  5  5   C7 Elbow Extension   5  5   C8/T1 Hand Intrinsics   5  5        Right Left   L2/3 Iliacus Hip flexion  5  5   L3/4 Qudratus Femoris Knee Extension  5  5   L4/5 Tib Anterior Ankle Dorsiflexion   5  5   L5/S1 Extensor Hallicus Longus Great toe extension  5  5   S1/S2 Gastroc/Soleus Plantar Flexion  5  5      Right Left   Triceps DTR 2+ 2+   Biceps DTR 2+ 2+   Brachioradialis DTR 2+ 2+   Patellar DTR 2+ 2+   Achilles DTR 2+ 2+   Ramirez Absent  Absent   Clonus Absent Absent   Babinski Absent Absent     Imaging:    I have reports.    Xray lumbar spine report 11-:  multilevel lumbar sponylosis with subtle degenerative retrolisthesis of L1 on L2 and atnerolisthesis of L4 on L5.  Chronic compression deformity invlolving L1 superior endplate with very minimal loss of stature.    MRI lumbar spine report 5-:  multilevel spondylosis in the lumbar spine.  Severe facet degenerative arthropathy bilaterally at L4/5.  Severe DDD at L5/S1 and Grade 1 anterolisthesis of L5 on S1.  Right pars defect of L5 and possible left sided pars defect. No disc herniation at any level.    Labs:  No results found for: LABA1C, HGBA1C    Lab Results   Component Value Date    WBC 9.11 03/18/2023    HGB 14.1 03/18/2023    HCT 41.4 03/18/2023    MCV 90 03/18/2023     03/18/2023           Assessment:     Mr. Mai has known spondylolisthesis in the lumbar spine.  He has acute onset right buttock pain.  Most likely myofascial as he has no radiculopathy, no neurological deficits and no pain with axilal facet loading.  Will try a medrol taper and home exericse.  If no improvement in the next week or two then proceed with PT. He is agreeable.  If pain starts to radiate into the leg or he develops numbness, weakness then proceed  with MRI.      Follow Up: RTC as needed in no improvement in 1-2 weeks.    : Not applicable      Problem List Items Addressed This Visit    None  Visit Diagnoses       Acute right-sided low back pain without sciatica    -  Primary    Relevant Medications    methylPREDNISolone (MEDROL, PROSPER,) 4 mg tablet    Spondylolisthesis of lumbar region                            Kaelyn Williamson PA-C  [facility]      This note was completed with dictation software and grammatical errors may exist.

## 2023-03-27 NOTE — TELEPHONE ENCOUNTER
----- Message from Sarah Don MA sent at 3/27/2023  8:13 AM CDT -----  Contact: pt  Back issue,, please call to schedule   Call back

## 2023-03-27 NOTE — TELEPHONE ENCOUNTER
Spoke with Mr. Bernal and scheduled him an appointment to see Kaelyn Williamson today, he indicated understanding.

## 2023-04-06 ENCOUNTER — PATIENT MESSAGE (OUTPATIENT)
Dept: PAIN MEDICINE | Facility: CLINIC | Age: 78
End: 2023-04-06
Payer: MEDICARE

## 2023-04-06 DIAGNOSIS — M43.10 SPONDYLOLISTHESIS, UNSPECIFIED SPINAL REGION: Primary | ICD-10-CM

## 2023-04-06 DIAGNOSIS — M54.50 ACUTE RIGHT-SIDED LOW BACK PAIN WITHOUT SCIATICA: ICD-10-CM

## 2023-04-06 DIAGNOSIS — M43.16 SPONDYLOLISTHESIS OF LUMBAR REGION: ICD-10-CM

## 2023-04-09 ENCOUNTER — PATIENT MESSAGE (OUTPATIENT)
Dept: FAMILY MEDICINE | Facility: CLINIC | Age: 78
End: 2023-04-09
Payer: MEDICARE

## 2023-04-12 ENCOUNTER — PATIENT MESSAGE (OUTPATIENT)
Dept: FAMILY MEDICINE | Facility: CLINIC | Age: 78
End: 2023-04-12
Payer: MEDICARE

## 2023-04-13 ENCOUNTER — OFFICE VISIT (OUTPATIENT)
Dept: FAMILY MEDICINE | Facility: CLINIC | Age: 78
End: 2023-04-13
Payer: MEDICARE

## 2023-04-13 VITALS
HEIGHT: 72 IN | BODY MASS INDEX: 23.53 KG/M2 | RESPIRATION RATE: 18 BRPM | DIASTOLIC BLOOD PRESSURE: 70 MMHG | HEART RATE: 74 BPM | WEIGHT: 173.75 LBS | OXYGEN SATURATION: 97 % | TEMPERATURE: 98 F | SYSTOLIC BLOOD PRESSURE: 118 MMHG

## 2023-04-13 DIAGNOSIS — I10 ESSENTIAL HYPERTENSION: ICD-10-CM

## 2023-04-13 DIAGNOSIS — J20.9 ACUTE BRONCHITIS, UNSPECIFIED ORGANISM: Primary | ICD-10-CM

## 2023-04-13 PROCEDURE — 1125F PR PAIN SEVERITY QUANTIFIED, PAIN PRESENT: ICD-10-PCS | Mod: HCNC,CPTII,S$GLB, | Performed by: INTERNAL MEDICINE

## 2023-04-13 PROCEDURE — 1160F RVW MEDS BY RX/DR IN RCRD: CPT | Mod: HCNC,CPTII,S$GLB, | Performed by: INTERNAL MEDICINE

## 2023-04-13 PROCEDURE — 3074F SYST BP LT 130 MM HG: CPT | Mod: HCNC,CPTII,S$GLB, | Performed by: INTERNAL MEDICINE

## 2023-04-13 PROCEDURE — 1160F PR REVIEW ALL MEDS BY PRESCRIBER/CLIN PHARMACIST DOCUMENTED: ICD-10-PCS | Mod: HCNC,CPTII,S$GLB, | Performed by: INTERNAL MEDICINE

## 2023-04-13 PROCEDURE — 99214 PR OFFICE/OUTPT VISIT, EST, LEVL IV, 30-39 MIN: ICD-10-PCS | Mod: HCNC,S$GLB,, | Performed by: INTERNAL MEDICINE

## 2023-04-13 PROCEDURE — 1125F AMNT PAIN NOTED PAIN PRSNT: CPT | Mod: HCNC,CPTII,S$GLB, | Performed by: INTERNAL MEDICINE

## 2023-04-13 PROCEDURE — 3078F DIAST BP <80 MM HG: CPT | Mod: HCNC,CPTII,S$GLB, | Performed by: INTERNAL MEDICINE

## 2023-04-13 PROCEDURE — 3074F PR MOST RECENT SYSTOLIC BLOOD PRESSURE < 130 MM HG: ICD-10-PCS | Mod: HCNC,CPTII,S$GLB, | Performed by: INTERNAL MEDICINE

## 2023-04-13 PROCEDURE — 99214 OFFICE O/P EST MOD 30 MIN: CPT | Mod: HCNC,S$GLB,, | Performed by: INTERNAL MEDICINE

## 2023-04-13 PROCEDURE — 99999 PR PBB SHADOW E&M-EST. PATIENT-LVL IV: CPT | Mod: PBBFAC,HCNC,, | Performed by: INTERNAL MEDICINE

## 2023-04-13 PROCEDURE — 3288F PR FALLS RISK ASSESSMENT DOCUMENTED: ICD-10-PCS | Mod: HCNC,CPTII,S$GLB, | Performed by: INTERNAL MEDICINE

## 2023-04-13 PROCEDURE — 1101F PT FALLS ASSESS-DOCD LE1/YR: CPT | Mod: HCNC,CPTII,S$GLB, | Performed by: INTERNAL MEDICINE

## 2023-04-13 PROCEDURE — 99999 PR PBB SHADOW E&M-EST. PATIENT-LVL IV: ICD-10-PCS | Mod: PBBFAC,HCNC,, | Performed by: INTERNAL MEDICINE

## 2023-04-13 PROCEDURE — 3078F PR MOST RECENT DIASTOLIC BLOOD PRESSURE < 80 MM HG: ICD-10-PCS | Mod: HCNC,CPTII,S$GLB, | Performed by: INTERNAL MEDICINE

## 2023-04-13 PROCEDURE — 1101F PR PT FALLS ASSESS DOC 0-1 FALLS W/OUT INJ PAST YR: ICD-10-PCS | Mod: HCNC,CPTII,S$GLB, | Performed by: INTERNAL MEDICINE

## 2023-04-13 PROCEDURE — 3288F FALL RISK ASSESSMENT DOCD: CPT | Mod: HCNC,CPTII,S$GLB, | Performed by: INTERNAL MEDICINE

## 2023-04-13 PROCEDURE — 1159F MED LIST DOCD IN RCRD: CPT | Mod: HCNC,CPTII,S$GLB, | Performed by: INTERNAL MEDICINE

## 2023-04-13 PROCEDURE — 1159F PR MEDICATION LIST DOCUMENTED IN MEDICAL RECORD: ICD-10-PCS | Mod: HCNC,CPTII,S$GLB, | Performed by: INTERNAL MEDICINE

## 2023-04-13 RX ORDER — BENZONATATE 100 MG/1
CAPSULE ORAL
Qty: 45 CAPSULE | Refills: 0 | Status: SHIPPED | OUTPATIENT
Start: 2023-04-13 | End: 2023-07-10

## 2023-04-13 RX ORDER — CODEINE PHOSPHATE AND GUAIFENESIN 10; 100 MG/5ML; MG/5ML
5 SOLUTION ORAL EVERY 6 HOURS PRN
Qty: 180 ML | Refills: 0 | Status: SHIPPED | OUTPATIENT
Start: 2023-04-13 | End: 2023-04-23

## 2023-04-13 RX ORDER — AZITHROMYCIN 250 MG/1
TABLET, FILM COATED ORAL
Qty: 6 TABLET | Refills: 0 | Status: SHIPPED | OUTPATIENT
Start: 2023-04-13 | End: 2023-07-10

## 2023-04-13 NOTE — PROGRESS NOTES
Subjective:       Patient ID: Srinivasan Bernal III is a 77 y.o. male.  Chief Complaint: Cough (Head cold 3 weeks ago, went away. Cough has stayed and in now in chest)     HPI    Complains of a hacking cough for 2 weeks.  It is sometimes dry and sometimes with gray-yellow sputum.  Some fatigue with it.  Started with nasal congestion with clear runny nose.  No f/c.  No myalgia.          Low WBC - mild; close to trend.     mild memory changes.  Mostly with names and phrases.  Stable       CAD s/p 2 stents. No chest pain.  Dr Hernandez.   HTN - controlled  HLD - uncontrolled for goal < 70   ED - controlled with Viagra now  Takes advil pm to sleep occasionally  OA b/l knees and left ankle s/p fusion - relieved with otc ibuprofen about 2x/ wk      Send labs to Dr Hernandez        Assessment:       1. Acute bronchitis, unspecified organism    2. Essential hypertension        Plan:       Acute bronchitis, unspecified organism  -     guaiFENesin-codeine 100-10 mg/5 ml (GUAIFENESIN AC)  mg/5 mL syrup; Take 5 mLs by mouth every 6 (six) hours as needed for Cough.  Dispense: 180 mL; Refill: 0  -     benzonatate (TESSALON) 100 MG capsule; 1 - 2 po every 6 hours prn cough  Dispense: 45 capsule; Refill: 0  -     azithromycin (ZITHROMAX Z-PROSPER) 250 MG tablet; Take 2 po day 1, then 1 po day 2 - 5  Dispense: 6 tablet; Refill: 0    Essential hypertension            Continue current management and monitor.  Other diagnoses were reviewed and found stable and will continue to monitor.  Counseled on regular exercise, maintenance of a healthy weight, balanced diet rich in fruits/vegetables and lean protein, and avoidance of unhealthy habits like smoking and excessive alcohol intake.   Also, counseled on importance of being compliant with medication, health appointments, diet and exercise.     No follow-ups on file.      Medication List with Changes/Refills   New Medications    AZITHROMYCIN (ZITHROMAX Z-PROSPER) 250 MG TABLET    Take 2 po day  1, then 1 po day 2 - 5    BENZONATATE (TESSALON) 100 MG CAPSULE    1 - 2 po every 6 hours prn cough    GUAIFENESIN-CODEINE 100-10 MG/5 ML (GUAIFENESIN AC)  MG/5 ML SYRUP    Take 5 mLs by mouth every 6 (six) hours as needed for Cough.   Current Medications    ASPIRIN (ECOTRIN) 81 MG EC TABLET    Take 81 mg by mouth once daily.    ATORVASTATIN (LIPITOR) 80 MG TABLET    Take 1 tablet (80 mg total) by mouth once daily.    EZETIMIBE (ZETIA) 10 MG TABLET    Take 1 tablet (10 mg total) by mouth once daily.    IBUPROFEN (ADVIL,MOTRIN) 200 MG TABLET    Take 200 mg by mouth every 6 (six) hours as needed for Pain.    METHOCARBAMOL (ROBAXIN) 750 MG TAB    Take 1 tablet (750 mg total) by mouth 4 (four) times daily as needed.    METHYLPREDNISOLONE (MEDROL, PROSPER,) 4 MG TABLET    use as directed    PERINDOPRIL ERBUMINE (ACEON) 4 MG TABLET    Take 1 tablet (4 mg total) by mouth once daily.    TADALAFIL (CIALIS) 20 MG TAB    Take 1 tablet (20 mg total) by mouth once daily.       BP Readings from Last 3 Encounters:   04/13/23 118/70   03/27/23 111/66   03/18/23 104/67     No results found for: HGBA1C  No results found for: TSH  Lab Results   Component Value Date    LDLCALC 72.6 01/04/2023    LDLCALC 63.2 06/29/2022    LDLCALC 64.0 12/15/2021     Lab Results   Component Value Date    TRIG 47 01/04/2023    TRIG 54 06/29/2022    TRIG 55 12/15/2021     Wt Readings from Last 3 Encounters:   04/13/23 78.8 kg (173 lb 11.6 oz)   03/27/23 77.2 kg (170 lb 3.1 oz)   03/18/23 77.1 kg (170 lb)     Lab Results   Component Value Date    HGB 14.1 03/18/2023    HCT 41.4 03/18/2023    WBC 9.11 03/18/2023    ALT 26 03/18/2023    AST 28 03/18/2023     03/18/2023    K 4.0 03/18/2023    CREATININE 1.00 03/18/2023    PSA 0.90 01/04/2023           Review of Systems        Objective:      Vitals:    04/13/23 1324   BP: 118/70   Pulse: 74   Resp: 18   Temp: 98.1 °F (36.7 °C)     Physical Exam  Vitals reviewed.   Constitutional:       Appearance:  Normal appearance.   Eyes:      Conjunctiva/sclera: Conjunctivae normal.   Cardiovascular:      Rate and Rhythm: Normal rate.   Pulmonary:      Effort: Pulmonary effort is normal.      Breath sounds: Normal breath sounds.   Musculoskeletal:      Cervical back: Normal range of motion.      Comments: Normal ROM bilateral    Skin:     General: Skin is warm and dry.   Neurological:      Mental Status: He is alert.      Cranial Nerves: Cranial nerve deficit: grossly intact.   Psychiatric:      Comments: Alert and orientated

## 2023-04-13 NOTE — TELEPHONE ENCOUNTER
Was trying to get patient in tomorrow and wouldn't let me, I redid the appt an done popped up for today. Called patient to verify he can come in and scheduled.

## 2023-04-21 ENCOUNTER — CLINICAL SUPPORT (OUTPATIENT)
Dept: REHABILITATION | Facility: HOSPITAL | Age: 78
End: 2023-04-21
Payer: MEDICARE

## 2023-04-21 DIAGNOSIS — M54.50 ACUTE RIGHT-SIDED LOW BACK PAIN WITHOUT SCIATICA: ICD-10-CM

## 2023-04-21 DIAGNOSIS — M43.16 SPONDYLOLISTHESIS OF LUMBAR REGION: ICD-10-CM

## 2023-04-21 PROCEDURE — 97161 PT EVAL LOW COMPLEX 20 MIN: CPT | Mod: HCNC,PO

## 2023-04-21 PROCEDURE — 97110 THERAPEUTIC EXERCISES: CPT | Mod: HCNC,PO

## 2023-04-21 NOTE — PLAN OF CARE
[facility]   Physical Therapy Initial Evaluation     Date: 4/21/2023   Name: Srinivasan Bernal III  Clinic Number: [id number]    Therapy Diagnosis:   Encounter Diagnoses   Name Primary?    Acute right-sided low back pain without sciatica     Spondylolisthesis of lumbar region      Physician: Kaelyn Williamson PA-C    Physician Orders: PT Eval and Treat   Medical Diagnosis from Referral: right sided low back pain  Evaluation Date: 4/21/2023  Authorization Period Expiration: 12/31/23  Plan of Care Expiration: 6/21/23  Progress Note Due: 5/21/23  Visit # / Visits authorized: 1/ 1   FOTO: Issued Visit #: Next visit        Precautions: Standard     Time In: 1000  Time Out: 1055  Total Appointment Time (timed & untimed codes): 55 minutes      SUBJECTIVE   Date of onset: 40 years ago    History of current condition - Sergei reports: that he had a major back issue that began about 40 years ago. It was in the L4/5 region on the right side. He states that he has had the pain on and off since then. About a month ago, the pain returned. He went to pain management, and they put him on a steroid pack, which worked until he was done the course of medication. At that point he was back to [facility]. He is now trying physical therapy for this. He states he feels good for about 5 minutes in the morning when he gets up, but then it starts up again. He struggles to walk his dog. Today, though, he woke up and he had reduced pain to start that resolved quickly. Before, it would take until noon or so for him to feel fine. He states that he does not like physical therapy, but he understands that he needs to try it. He states that the pain has migrated down to the knee area over the last month or so. He states that the real pain is in the L4/5 region, though. Patient states that he does not like exercise. Sitting always feels good, and bending forward feels good.    Falls: none    Imaging, none: no recent  imaging    Prior Therapy: no, patient does not like exercise  Social History:  lives alone  Occupation: retired  Prior Level of Function: fully independent-played tennis, golf, pickle ball   Current Level of Function: limited in walking in the morning    Pain:  Current 0/10, worst 7/10, best 0/10   Location: back - lumbar and right leg(s)  Description: Aching and Dull  Aggravating Factors: Standing and Walking  Easing Factors: lying down and sitting, bending over    Patients goals: reduce pain     Medical History:   Past Medical History:   Diagnosis Date    Cataracts, bilateral     2009, 2013    Coronary artery disease 2004    Hypercholesteremia     Kidney stone 2015    Urerteroscopy    Skin cancer 2015    left ankle/ left ear       Surgical History:   Srinivasan Bernal III  has a past surgical history that includes Tonsillectomy; Toe Surgery; Foot surgery; Ankle Fusion; Hernia repair (Left, 2008); Knee arthroscopy (Left, 2010); Knee surgery (Left, 2010); Knee arthroscopy (Right, 2010); Knee surgery (Right, 2012); Mouth surgery; Circumcision; and Colonoscopy (N/A, 9/20/2022).    Medications:   Srinivasan has a current medication list which includes the following prescription(s): aspirin, atorvastatin, azithromycin, benzonatate, ezetimibe, guaifenesin-codeine 100-10 mg/5 ml, ibuprofen, methocarbamol, perindopril erbumine, and tadalafil.    Allergies:   Review of patient's allergies indicates:  No Known Allergies       OBJECTIVE     Repeated Motion Testing:    Symptoms Response   Repeated Flexion  no change   Repeated Extension   Range of Motion limited but patient reported feeling a good stretch.    Left Sidebending  no change   Right Sidebending   Slight increase in discomfort       Myotomes:    Left  Right   L2  5  4+   L3  5  5   L4  5  4+   L5  4  4   S1  5  5   S2  5  5     Patient unable to extend toes beyond neutral.     Dermatomes:  All WNL     Strength:    Left  Right   Hip Abduction   4/5  4/5   Hip Extension  "*measured in bridge  3/5 (caused pain on right side)  3/5   Upper Abdominals  4/5   Lower Abdominals  4/5     Special Tests:    Left  Right    Sign of Buttock  negative negative   MARTHA negative negative   SLR negative negative   FADIR negative negative   Scour negative negative   Femoral Compression negative negative   Log Roll negative negative   Femoral Nerve Tension Test negative negative    negative negative     Hip extension: 0 degrees          Limitation/Restriction for FOTO NV Survey    Therapist reviewed FOTO scores for Srinivasan Bernal III on 4/21/2023.   FOTO documents entered into [employer] - see Media section.    Limitation Score: NV%         TREATMENT     Plan for Next Visit:  Flexion based exercises, mobility for hip extension     Total Treatment time (time-based codes) separate from Evaluation: 10 minutes      Sergei received the treatments listed below:      therapeutic exercises to develop ROM and flexibility for 10 minutes including:  Education on mobility expectations and home exercise program   TC 10x10"      PATIENT EDUCATION AND HOME EXERCISES     Education provided:   - mobility expectations and home exercise program     Written Home Exercises Provided: yes. Exercises were reviewed and Sergei was able to demonstrate them prior to the end of the session.  Sergei demonstrated good  understanding of the education provided. See EMR under Patient Instructions for exercises provided during therapy sessions.    Access Code: [email or web address]  URL: [email or web address]  Date: 04/21/2023  Prepared by: Azul Mayfield    Exercises  - Supine Double Knee to Chest  - 1 x daily - 7 x weekly - 3 sets - 10 reps - 10 sec hold    ASSESSMENT     Srinivasan is a 77 y.o. male referred to outpatient Physical Therapy with a medical diagnosis of acute right sided low back pain. Patient presents with mild weakness and significant flexion preference. Current presentation in conjunction with age and other risk factors indicates " lumbar stenosis is likely. Interventions, including manual therapy, therapeutic exercise, therapeutic activities, gait training, and neuromuscular re-education will be utilized to address these impairments. Physical therapy intervention is medically necessary to optimize functional outcomes.      Patient prognosis is Good.   Patientt will benefit from skilled outpatient Physical Therapy to address the deficits stated above and in the chart below, provide patient /family education, and to maximize patientt's level of independence.     Plan of care discussed with patient: Yes  Patient's spiritual, cultural and educational needs considered and patient is agreeable to the plan of care and goals as stated below:     Anticipated Barriers for therapy: none    Medical Necessity is demonstrated by the following  History  Co-morbidities and personal factors that may impact the plan of care Co-morbidities:   See medical history above    Personal Factors:   no deficits     low   Examination  Body Structures and Functions, activity limitations and participation restrictions that may impact the plan of care Body Regions:   lower extremities    Body Systems:    ROM  strength  gait  motor control    Participation Restrictions:   none    Activity limitations:   Learning and applying knowledge  no deficits    General Tasks and Commands  no deficits    Communication  no deficits    Mobility  walking    Self care  no deficits    Domestic Life  doing house work (cleaning house, washing dishes, laundry)    Interactions/Relationships  no deficits    Life Areas  no deficits    Community and Social Life  community life  recreation and leisure         moderate   Clinical Presentation stable and uncomplicated low   Decision Making/ Complexity Score: low     Goals:  Short Term Goals: 2 weeks   Patient will report morning stiffness and pain lasting < or = 1 hour to improve quality of life.  Patient will be compliant with home exercise program.      Long Term Goals: 4 weeks   Patient will improve core strength to > or = 4+/5 to improve score.  Patient will walk his dog for 30 minutes with < or = 2/10 pain.  Patient will report < or = 2/10 pain at maximum to improve quality of life.   Patient will be independent in management.    PLAN   Plan of care Certification: 4/21/2023 to 6/21/23.      Outpatient Physical Therapy 2 times weekly for 4 weeks to include the following interventions: Gait Training, Manual Therapy, Moist Heat/ Ice, Neuromuscular Re-ed, Patient Education, Self Care, Therapeutic Activities, and Therapeutic Exercise.     Azul Mayfield, PT      I CERTIFY THE NEED FOR THESE SERVICES FURNISHED UNDER THIS PLAN OF TREATMENT AND WHILE UNDER MY CARE   Physician's comments:     Physician's Signature: ___________________________________________________

## 2023-05-03 ENCOUNTER — CLINICAL SUPPORT (OUTPATIENT)
Dept: REHABILITATION | Facility: HOSPITAL | Age: 78
End: 2023-05-03
Payer: MEDICARE

## 2023-05-03 DIAGNOSIS — M54.50 ACUTE LOW BACK PAIN, UNSPECIFIED BACK PAIN LATERALITY, UNSPECIFIED WHETHER SCIATICA PRESENT: ICD-10-CM

## 2023-05-03 PROCEDURE — 97530 THERAPEUTIC ACTIVITIES: CPT | Mod: PO

## 2023-05-03 PROCEDURE — 97112 NEUROMUSCULAR REEDUCATION: CPT | Mod: PO

## 2023-05-03 PROCEDURE — 97110 THERAPEUTIC EXERCISES: CPT | Mod: PO

## 2023-05-03 NOTE — PROGRESS NOTES
"[facility]   Physical Therapy Treatment Note     Name: Srinivasan Bernal III  Clinic Number: [id number]    Therapy Diagnosis:   Encounter Diagnosis   Name Primary?    Acute low back pain, unspecified back pain laterality, unspecified whether sciatica present      Physician: Kaelyn Williamson PA-C    Visit Date: 5/3/2023    Physician Orders: PT Eval and Treat   Medical Diagnosis from Referral: right sided low back pain  Evaluation Date: 4/21/2023  Authorization Period Expiration: 12/31/23  Plan of Care Expiration: 6/21/23  Progress Note Due: 5/21/23  Visit # / Visits authorized: 1/ 1   FOTO: Issued Visit #: Next visit    PTA Visit #: 0/5     Time In: 1400  Time Out: 1504  Total Billable Time: 64 minutes    SUBJECTIVE     Pt reports: that things are feeling better. He states that the debilitating pain after walking is gone. He has some mild pain in the morning. He is having some pain coming into the right knee.   He was compliant with home exercise program.  Response to previous treatment: reduced pain  Functional change: able to walk farther    Pain: 0/10  Location: right back      OBJECTIVE     Objective Measures updated at progress report unless specified.     Treatment     Plan for Next Visit:  Continue strength/mobility     Sergei received the treatments listed below:      therapeutic exercises to develop strength, endurance, ROM, and flexibility for 25 minutes including:  Lumbar flexion rollouts 30x 5" hold each, forward/lateral    manual therapy techniques: Joint mobilizations, Manual traction, Myofacial release, and Soft tissue Mobilization were applied to the:  for 00 minutes, including:    neuromuscular re-education activities to improve: Balance, Coordination, Kinesthetic, Sense, Proprioception, and Posture for 25 minutes. The following activities were included:  Toe yoga:   Great toe extension  Lesser toe extension  Toe splay  Standing crunch    therapeutic activities to improve " functional performance for 14  minutes, including:  Education on functional implications of mobility, impact of muscle on joint function          Patient Education and Home Exercises     Home Exercises Provided and Patient Education Provided     Education provided:   - range of motion expectations  -foot intrinsic retraining    Written Home Exercises Provided: yes. Exercises were reviewed and Sergei was able to demonstrate them prior to the end of the session.  Sergei demonstrated good  understanding of the education provided. See EMR under Patient Instructions for exercises provided during therapy sessions    Access Code: [email or web address]  URL: [email or web address]  Date: 05/03/2023  Prepared by: Azul Mayfield    Exercises  - Supine Double Knee to Chest  - 1 x daily - 7 x weekly - 3 sets - 10 reps - 10 sec hold  - Seated Great Toe Extension  - 1 x daily - 2 sets - 15 reps  - Seated Lesser Toes Extension  - 1 x daily - 2 sets - 15 reps  - Toe Spreading  - 1 x daily - 2 sets - 15 reps    ASSESSMENT     Session emphasized lumbar flexion mobility with a focus on opening the right side of the spine. Activities to address core stability were added as well. Intrinsic foot strength was addressed as patient was observed to have poor control, which may be contributing to poor force distribution through the kinetic chain. These were very challenging for the patient with assistance of the other foot required to promote isolation of movement. Standing crunches were performed for flexion based core strengthening and improved neuromuscular control of the abdominal musculature with good tolerance. Continued physical therapy intervention is medically necessary to maximize functional outcomes.      Sergei Is progressing well towards his goals.   Pt prognosis is Good.     Pt will continue to benefit from skilled outpatient physical therapy to address the deficits listed in the problem list box on initial evaluation, provide pt/family  education and to maximize pt's level of independence in the home and community environment.     Pt's spiritual, cultural and educational needs considered and pt agreeable to plan of care and goals.     Anticipated barriers to physical therapy: none    Goals:   Short Term Goals: 2 weeks   Patient will report morning stiffness and pain lasting < or = 1 hour to improve quality of life.  Patient will be compliant with home exercise program.      Long Term Goals: 4 weeks   Patient will improve core strength to > or = 4+/5 to improve score.  Patient will walk his dog for 30 minutes with < or = 2/10 pain.  Patient will report < or = 2/10 pain at maximum to improve quality of life.   Patient will be independent in management.    PLAN     Continue with physical therapy to address impairments.    Azul Mayfield, PT

## 2023-05-10 ENCOUNTER — CLINICAL SUPPORT (OUTPATIENT)
Dept: REHABILITATION | Facility: HOSPITAL | Age: 78
End: 2023-05-10
Payer: MEDICARE

## 2023-05-10 DIAGNOSIS — M54.50 ACUTE LOW BACK PAIN, UNSPECIFIED BACK PAIN LATERALITY, UNSPECIFIED WHETHER SCIATICA PRESENT: Primary | ICD-10-CM

## 2023-05-10 PROCEDURE — 97112 NEUROMUSCULAR REEDUCATION: CPT | Mod: PO,CQ

## 2023-05-10 PROCEDURE — 97110 THERAPEUTIC EXERCISES: CPT | Mod: PO,CQ

## 2023-05-10 PROCEDURE — 97530 THERAPEUTIC ACTIVITIES: CPT | Mod: PO,CQ

## 2023-05-10 NOTE — PROGRESS NOTES
"[facility]   Physical Therapy Treatment Note     Name: Srinivasan Bernal [facility]  Clinic Number: [id number]    Therapy Diagnosis:   Encounter Diagnosis   Name Primary?    Acute low back pain, unspecified back pain laterality, unspecified whether sciatica present Yes     Physician: Kaelyn Williamson PA-C    Visit Date: 5/10/2023    Physician Orders: PT Eval and Treat   Medical Diagnosis from Referral: right sided low back pain  Evaluation Date: 4/21/2023  Authorization Period Expiration: 12/31/23  Plan of Care Expiration: 6/21/23  Progress Note Due: 5/21/23  Visit # / Visits authorized: 2/20  FOTO: Issued Visit #: Next visit    PTA Visit #: 1/5     Time In: 1332  Time Out: 1430  Total Billable Time: 53 minutes    SUBJECTIVE     Pt reports: his back is okay today but mornings are not enjoyable. Patient states he walks his dog for 20 minutes every morning and he usually has to stop and bend over 5-6 times during this walk. Patient states he had a CT scan done for cardiology that showed grade 1 retrolisthesis of L1 on L2 and he is wondering if this has anything to do with his current lower back pain. Patient states he does have occasional tingling into anterior (R) thigh but he does not report pain or weakness.  He was compliant with home exercise program.  Response to previous treatment: reduced pain  Functional change: able to walk farther    Pain: 0/10  Location: right back      OBJECTIVE     Objective Measures updated at progress report unless specified.     Treatment     Sergei received the treatments listed below:      therapeutic exercises to develop strength, endurance, ROM, and flexibility for 15 minutes including:  DKTC x 10, 10 sec hold   Lumbar flexion rollouts 30x 5" hold each, forward/lateral  BLE shuttle squats 3x10, 75#     manual therapy techniques: Joint mobilizations, Manual traction, Myofacial release, and Soft tissue Mobilization were applied to the:  for 00 minutes, " including:    neuromuscular re-education activities to improve: Balance, Coordination, Kinesthetic, Sense, Proprioception, and Posture for 25 minutes. The following activities were included:  Posterior pelvic tilt x 20, 5 sec hold (cues for correction of elevation)  PPT + ball squeeze x 2 minutes, 5 sec hold   PPT + dead bug from hooklying (orange SB) x 2 minutes  Standing hip abduction (cues for core activation, maintaining neutral foot and correction of trunk lean) 2x10  Standing paloff press (blue TB) 2x10  Standing crunch x 2 minutes, 5 sec hold     Not performed today:  Toe yoga  Great toe extension  Lesser toe extension  Toe splay    therapeutic activities to improve functional performance for 10 minutes, including:  Heavy education on functional implications of spinal stenosis and why he feels relief with forward flexion. Also, discussed sleeping positions and importance of core and hip muscle recruitment for spinal stability.     Patient Education and Home Exercises     Home Exercises Provided and Patient Education Provided     Education provided:   - range of motion expectations  - foot intrinsic retraining    Written Home Exercises Provided: Patient instructed to cont prior HEP. Exercises were reviewed and Sergei was able to demonstrate them prior to the end of the session.  Sergei demonstrated good  understanding of the education provided. See EMR under Patient Instructions for exercises provided during therapy sessions    Access Code: [email or web address]  URL: [email or web address]  Date: 05/03/2023  Prepared by: Azul Mayfield    Exercises  - Supine Double Knee to Chest  - 1 x daily - 7 x weekly - 3 sets - 10 reps - 10 sec hold  - Seated Great Toe Extension  - 1 x daily - 2 sets - 15 reps  - Seated Lesser Toes Extension  - 1 x daily - 2 sets - 15 reps  - Toe Spreading  - 1 x daily - 2 sets - 15 reps    ASSESSMENT     Sergei continues to respond positively to flexion bases exercises as this relieves (R) lower  back pain. Patient also complains of occasional (R) anterior thigh tingling. This was provoked with L anti-rotation during paloff press and standing core activation. Complete resolution of pain achieved with forward flexion. Frequent tactile cueing for PPT dual task activities as he tends to elevate pelvis. Extensive education on current condition, sleeping positions and activity modification as patient presents to clinic with several questions. Continued physical therapy intervention is medically necessary to maximize functional outcomes.    Sergei Is progressing well towards his goals.   Pt prognosis is Good.     Pt will continue to benefit from skilled outpatient physical therapy to address the deficits listed in the problem list box on initial evaluation, provide pt/family education and to maximize pt's level of independence in the home and community environment.     Pt's spiritual, cultural and educational needs considered and pt agreeable to plan of care and goals.     Anticipated barriers to physical therapy: none    Goals:   Short Term Goals: 2 weeks   Patient will report morning stiffness and pain lasting < or = 1 hour to improve quality of life.  Patient will be compliant with home exercise program.      Long Term Goals: 4 weeks   Patient will improve core strength to > or = 4+/5 to improve score.  Patient will walk his dog for 30 minutes with < or = 2/10 pain.  Patient will report < or = 2/10 pain at maximum to improve quality of life.   Patient will be independent in management.    PLAN     Continue with physical therapy to address impairments.    Nydia Camacho, PTA

## 2023-05-12 ENCOUNTER — CLINICAL SUPPORT (OUTPATIENT)
Dept: REHABILITATION | Facility: HOSPITAL | Age: 78
End: 2023-05-12
Payer: MEDICARE

## 2023-05-12 DIAGNOSIS — M54.50 ACUTE LOW BACK PAIN, UNSPECIFIED BACK PAIN LATERALITY, UNSPECIFIED WHETHER SCIATICA PRESENT: Primary | ICD-10-CM

## 2023-05-12 PROCEDURE — 97110 THERAPEUTIC EXERCISES: CPT | Mod: PO,CQ

## 2023-05-12 PROCEDURE — 97112 NEUROMUSCULAR REEDUCATION: CPT | Mod: PO,CQ

## 2023-05-12 NOTE — PROGRESS NOTES
"[facility]   Physical Therapy Treatment Note     Name: Srinivasan Bernal III  Clinic Number: [id number]    Therapy Diagnosis:   Encounter Diagnosis   Name Primary?    Acute low back pain, unspecified back pain laterality, unspecified whether sciatica present Yes     Physician: Kaelyn Williamson PA-C    Visit Date: 5/12/2023    Physician Orders: PT Eval and Treat   Medical Diagnosis from Referral: right sided low back pain  Evaluation Date: 4/21/2023  Authorization Period Expiration: 12/31/23  Plan of Care Expiration: 6/21/23  Progress Note Due: 5/21/23  Visit # / Visits authorized: 2/20  FOTO: Issued Visit #: Next visit    PTA Visit #: 2/5     Time In: 1301   Time Out: 1355  Total Billable Time:  30 minutes    SUBJECTIVE     Pt reports: he is trying to do everything right but he feels like he isn't making progress. Patient states he could barely walk a half of a block this morning due to lower back pain. Patient states that this was a bad morning but he was able to walk the dog yesterday without increase in symptoms. Patient states He was compliant with home exercise program.  Response to previous treatment: reduced pain  Functional change: able to walk farther    Pain: 0/10  Location: right back      OBJECTIVE     Objective Measures updated at progress report unless specified.     Treatment     Sergei received the treatments listed below:      therapeutic exercises to develop strength, endurance, ROM, and flexibility for 15 minutes including:  DKTC x 10, 10 sec hold   Lumbar flexion rollouts 30x 5" hold each, forward/lateral  BLE shuttle squats 3x10, 75#     manual therapy techniques: Joint mobilizations, Manual traction, Myofacial release, and Soft tissue Mobilization were applied to the:  for 00 minutes, including:    neuromuscular re-education activities to improve: Balance, Coordination, Kinesthetic, Sense, Proprioception, and Posture for 25 minutes. The following activities were " included:  Posterior pelvic tilt x 20, 5 sec hold (cues for correction of elevation)  PPT + ball squeeze x 2 minutes, 5 sec hold   PPT + dead bug from hooklying (orange SB) x 2 minutes  PPT + bridge 2x10  Standing hip abduction (cues for core activation, maintaining neutral foot and correction of trunk lean) 2x10  Standing paloff press (blue TB) 2x10  Standing row with isometric hold on contralateral side 2x10 ea (blue TB)  Standing crunch x 2 minutes, 5 sec hold     Not performed today:  Toe yoga  Great toe extension  Lesser toe extension  Toe splay    therapeutic activities to improve functional performance for 10 minutes, including:  Heavy education on functional implications of spinal stenosis and why he feels relief with forward flexion. Also, discussed sleeping positions and importance of core and hip muscle recruitment for spinal stability.     Patient Education and Home Exercises     Home Exercises Provided and Patient Education Provided     Education provided:   - range of motion expectations  - foot intrinsic retraining    Written Home Exercises Provided: Patient instructed to cont prior HEP. Exercises were reviewed and Sergei was able to demonstrate them prior to the end of the session.  Sergei demonstrated good  understanding of the education provided. See EMR under Patient Instructions for exercises provided during therapy sessions    Access Code: [email or web address]  URL: [email or web address]  Date: 05/03/2023  Prepared by: Azul Mayfield    Exercises  - Supine Double Knee to Chest  - 1 x daily - 7 x weekly - 3 sets - 10 reps - 10 sec hold  - Seated Great Toe Extension  - 1 x daily - 2 sets - 15 reps  - Seated Lesser Toes Extension  - 1 x daily - 2 sets - 15 reps  - Toe Spreading  - 1 x daily - 2 sets - 15 reps    ASSESSMENT     Sergei continues to respond positively to flexion bases exercises as this relieves (R) lower back pain. Difficulty maintaining posterior pelvic tilt today provoking back pain with LE  extension. No adverse effects to bridging. Extensive education again today on current condition, sleeping positions and activity modification as patient presents to clinic with several questions. Continued physical therapy intervention is medically necessary to maximize functional outcomes.    Sergei Is progressing well towards his goals.   Pt prognosis is Good.     Pt will continue to benefit from skilled outpatient physical therapy to address the deficits listed in the problem list box on initial evaluation, provide pt/family education and to maximize pt's level of independence in the home and community environment.     Pt's spiritual, cultural and educational needs considered and pt agreeable to plan of care and goals.     Anticipated barriers to physical therapy: none    Goals:   Short Term Goals: 2 weeks   Patient will report morning stiffness and pain lasting < or = 1 hour to improve quality of life.  Patient will be compliant with home exercise program.      Long Term Goals: 4 weeks   Patient will improve core strength to > or = 4+/5 to improve score.  Patient will walk his dog for 30 minutes with < or = 2/10 pain.  Patient will report < or = 2/10 pain at maximum to improve quality of life.   Patient will be independent in management.    PLAN     Continue with physical therapy to address impairments.    Nydia Camacho, PTA

## 2023-05-17 ENCOUNTER — CLINICAL SUPPORT (OUTPATIENT)
Dept: REHABILITATION | Facility: HOSPITAL | Age: 78
End: 2023-05-17
Payer: MEDICARE

## 2023-05-17 DIAGNOSIS — M54.50 ACUTE LOW BACK PAIN, UNSPECIFIED BACK PAIN LATERALITY, UNSPECIFIED WHETHER SCIATICA PRESENT: Primary | ICD-10-CM

## 2023-05-17 PROCEDURE — 97112 NEUROMUSCULAR REEDUCATION: CPT | Mod: PO

## 2023-05-17 PROCEDURE — 97530 THERAPEUTIC ACTIVITIES: CPT | Mod: PO

## 2023-05-17 PROCEDURE — 97110 THERAPEUTIC EXERCISES: CPT | Mod: PO

## 2023-05-17 NOTE — PROGRESS NOTES
"[facility]   Physical Therapy Treatment Note     Name: Srinivasan Bernal III  Clinic Number: [id number]    Therapy Diagnosis:   Encounter Diagnosis   Name Primary?    Acute low back pain, unspecified back pain laterality, unspecified whether sciatica present Yes     Physician: Kaelyn Williamson PA-C    Visit Date: 5/17/2023    Physician Orders: PT Eval and Treat   Medical Diagnosis from Referral: right sided low back pain  Evaluation Date: 4/21/2023  Authorization Period Expiration: 12/31/23  Plan of Care Expiration: 6/21/23  Progress Note Due: 5/21/23  Visit # / Visits authorized: 4/20  FOTO: Issued Visit #: Next visit    PTA Visit #: 2/5     Time In: 1056   Time Out: 1156  Total Billable Time:  60 minutes    SUBJECTIVE     Pt reports: that he feels like he is not making a lot of progress or change from previous session. Changing the time he walks the dog did not do much. He feels like the pain typically clears up lateral in the afternoon. Patient states He was compliant with home exercise program.  Response to previous treatment: reduced pain  Functional change: able to walk farther    Pain: 0/10  Location: right back      OBJECTIVE     Objective Measures updated at progress report unless specified.     Treatment     Sergei received the treatments listed below:      therapeutic exercises to develop strength, endurance, ROM, and flexibility for 30 minutes including:  DKTC 2 x 10, 10 sec hold on stability ball  Lumbar flexion rollouts 30x 5" hold each, forward/lateral  Prone quad stretch 2x1 min per side  BLE shuttle squats 3x10, 75# NP  Femoral nerve glides x20    manual therapy techniques: Joint mobilizations, Manual traction, Myofacial release, and Soft tissue Mobilization were applied to the:  for 00 minutes, including:    neuromuscular re-education activities to improve: Balance, Coordination, Kinesthetic, Sense, Proprioception, and Posture for 20 minutes. The following activities were " included:  Posterior pelvic tilt x 20, 5 sec hold (cues for correction of elevation)  PPT + ball squeeze x 2 minutes, 5 sec hold   PPT + dead bug from hooklying (orange SB) x 2 minutes  PPT + bridge 2x10  NP:  Standing hip abduction (cues for core activation, maintaining neutral foot and correction of trunk lean) 2x10  Standing paloff press (blue TB) 2x10  Standing row with isometric hold on contralateral side 2x10 ea (blue TB)  Standing crunch x 2 minutes, 5 sec hold     Not performed today:  Toe yoga  Great toe extension  Lesser toe extension  Toe splay    therapeutic activities to improve functional performance for 10 minutes, including:  Heavy education on expected functional progression    Patient Education and Home Exercises     Home Exercises Provided and Patient Education Provided     Education provided:   - range of motion expectations  - foot intrinsic retraining    Written Home Exercises Provided: Patient instructed to cont prior HEP. Exercises were reviewed and Sergei was able to demonstrate them prior to the end of the session.  Sergei demonstrated good  understanding of the education provided. See EMR under Patient Instructions for exercises provided during therapy sessions    Access Code: [email or web address]  URL: [email or web address]  Date: 05/03/2023  Prepared by: Azul Mayfield    Exercises  - Supine Double Knee to Chest  - 1 x daily - 7 x weekly - 3 sets - 10 reps - 10 sec hold  - Seated Great Toe Extension  - 1 x daily - 2 sets - 15 reps  - Seated Lesser Toes Extension  - 1 x daily - 2 sets - 15 reps  - Toe Spreading  - 1 x daily - 2 sets - 15 reps    ASSESSMENT     Verbal cues required during pelvic tilts to decrease engagement of lower extremity musculature. Tactile facilitation was required under the spine during ball presses to ensure proper motion of the lumbar spine and pelvis. Sergei reported tingling in the right knee following bridges. Straight leg raise did not increase symptoms, and femoral  nerve tension testing was negative initially. Significant quadriceps tightness was noted on the right side, however; therefore prone quad stretch was adding. Increased tingling was present following quad stretch, indicating that neural provocation is positive with prolonged tension on the nerve. Minor numbness noted with sensation testing. Symptoms improved with flexion rollouts, indicating that neural compression at the spinal level is likely. Continued physical therapy intervention is medically necessary to maximize functional outcomes.      Sergei Is progressing well towards his goals.   Pt prognosis is Good.     Pt will continue to benefit from skilled outpatient physical therapy to address the deficits listed in the problem list box on initial evaluation, provide pt/family education and to maximize pt's level of independence in the home and community environment.     Pt's spiritual, cultural and educational needs considered and pt agreeable to plan of care and goals.     Anticipated barriers to physical therapy: none    Goals:   Short Term Goals: 2 weeks   Patient will report morning stiffness and pain lasting < or = 1 hour to improve quality of life.  Patient will be compliant with home exercise program.      Long Term Goals: 4 weeks   Patient will improve core strength to > or = 4+/5 to improve score.  Patient will walk his dog for 30 minutes with < or = 2/10 pain.  Patient will report < or = 2/10 pain at maximum to improve quality of life.   Patient will be independent in management.    PLAN     Continue with physical therapy to address impairments.    Azul Mayfield, PT

## 2023-05-22 ENCOUNTER — CLINICAL SUPPORT (OUTPATIENT)
Dept: REHABILITATION | Facility: HOSPITAL | Age: 78
End: 2023-05-22
Payer: MEDICARE

## 2023-05-22 DIAGNOSIS — M54.50 ACUTE LOW BACK PAIN, UNSPECIFIED BACK PAIN LATERALITY, UNSPECIFIED WHETHER SCIATICA PRESENT: Primary | ICD-10-CM

## 2023-05-22 PROCEDURE — 97112 NEUROMUSCULAR REEDUCATION: CPT | Mod: PO,CQ

## 2023-05-22 PROCEDURE — 97110 THERAPEUTIC EXERCISES: CPT | Mod: PO,CQ

## 2023-05-22 NOTE — PROGRESS NOTES
"[facility]   Physical Therapy Treatment Note     Name: Srinivasan Bernal III  Clinic Number: [id number]    Therapy Diagnosis:   Encounter Diagnosis   Name Primary?    Acute low back pain, unspecified back pain laterality, unspecified whether sciatica present Yes     Physician: Kaelyn Williamson PA-C    Visit Date: 5/22/2023    Physician Orders: PT Eval and Treat   Medical Diagnosis from Referral: right sided low back pain  Evaluation Date: 4/21/2023  Authorization Period Expiration: 12/31/23  Plan of Care Expiration: 6/21/23  Progress Note Due: 5/21/23  Visit # / Visits authorized: 5/20  FOTO: Issued Visit #: Next visit    PTA Visit #: 1/5     Time In: 1404  Time Out: 1500  Total Billable Time:  60 minutes    SUBJECTIVE     Pt reports: he was able to walk the dog today without having to bend over. Patient states he did have back pain but it wasn't intense enough to bend over. Patient states he is still having tingling at the knee from time to time. Patient states he is having trouble crossing (R)LE over (L) LE when putting on shoe. He was compliant with home exercise program.  Response to previous treatment: reduced pain  Functional change: able to walk farther    Pain: 0/10  Location: right back      OBJECTIVE     Objective Measures updated at progress report unless specified.     Treatment     Sergei received the treatments listed below:      therapeutic exercises to develop strength, endurance, ROM, and flexibility for 30 minutes including:  DKTC 2 x 10, 10 sec hold on stability ball  Lumbar flexion rollouts 30x 5" hold each, forward/lateral  Prone quad stretch 2x1 min per side  BLE shuttle squats 3x10, 75#   SL shuttle squats 2x10, 50#     Femoral nerve glides x 20  Seated edge of mat: hip ER (green TB) 2x10    manual therapy techniques: Joint mobilizations, Manual traction, Myofacial release, and Soft tissue Mobilization were applied to the:  for 00 minutes, including:    neuromuscular " re-education activities to improve: Balance, Coordination, Kinesthetic, Sense, Proprioception, and Posture for 20 minutes. The following activities were included:  Posterior pelvic tilt x 20, 5 sec hold (cues for correction of elevation)  PPT + ball squeeze x 2 minutes, 5 sec hold   PPT + dead bug from hooklying (orange SB) x 2 minutes  PPT + bridge 2x10  Prone gluteal set x 10  Prone glute set + hip extension x 10  Seated march 2x10 (R)   Standing hip abduction 2x10 (yellow TB at knees)    therapeutic activities to improve functional performance for 00 minutes, including:  Heavy education on expected functional progression    Patient Education and Home Exercises     Home Exercises Provided and Patient Education Provided     Education provided:   - range of motion expectations  - foot intrinsic retraining    Written Home Exercises Provided: Patient instructed to cont prior HEP. Exercises were reviewed and Sergei was able to demonstrate them prior to the end of the session.  Sergei demonstrated good  understanding of the education provided. See EMR under Patient Instructions for exercises provided during therapy sessions    Access Code: [email or web address]  URL: [email or web address]  Date: 05/03/2023  Prepared by: Azul Mayfield    Exercises  - Supine Double Knee to Chest  - 1 x daily - 7 x weekly - 3 sets - 10 reps - 10 sec hold  - Seated Great Toe Extension  - 1 x daily - 2 sets - 15 reps  - Seated Lesser Toes Extension  - 1 x daily - 2 sets - 15 reps  - Toe Spreading  - 1 x daily - 2 sets - 15 reps    ASSESSMENT     Proximal (R) hip girdle weakness more evident today as patient is demonstrating difficulty with march against gravity and standing hip abduction. This coincides with patient's subjective reports of having difficulty putting on (R) shoe. (R) tingling present in straight back positions and when femoral nerve is tensioned. Continued physical therapy intervention is medically necessary to maximize functional  outcomes.    Sergei Is progressing well towards his goals.   Pt prognosis is Good.     Pt will continue to benefit from skilled outpatient physical therapy to address the deficits listed in the problem list box on initial evaluation, provide pt/family education and to maximize pt's level of independence in the home and community environment.     Pt's spiritual, cultural and educational needs considered and pt agreeable to plan of care and goals.     Anticipated barriers to physical therapy: none    Goals:   Short Term Goals: 2 weeks   Patient will report morning stiffness and pain lasting < or = 1 hour to improve quality of life.  Patient will be compliant with home exercise program.      Long Term Goals: 4 weeks   Patient will improve core strength to > or = 4+/5 to improve score.  Patient will walk his dog for 30 minutes with < or = 2/10 pain.  Patient will report < or = 2/10 pain at maximum to improve quality of life.   Patient will be independent in management.    PLAN     Continue with physical therapy to address impairments.    Nydia Camacho, PTA

## 2023-05-26 ENCOUNTER — CLINICAL SUPPORT (OUTPATIENT)
Dept: REHABILITATION | Facility: HOSPITAL | Age: 78
End: 2023-05-26
Payer: MEDICARE

## 2023-05-26 DIAGNOSIS — M54.50 ACUTE LOW BACK PAIN, UNSPECIFIED BACK PAIN LATERALITY, UNSPECIFIED WHETHER SCIATICA PRESENT: Primary | ICD-10-CM

## 2023-05-26 PROCEDURE — 97112 NEUROMUSCULAR REEDUCATION: CPT | Mod: PO,CQ

## 2023-05-26 NOTE — PROGRESS NOTES
"[facility]   Physical Therapy Treatment Note     Name: Srinivasan Bernal III  Clinic Number: [id number]    Therapy Diagnosis:   Encounter Diagnosis   Name Primary?    Acute low back pain, unspecified back pain laterality, unspecified whether sciatica present Yes     Physician: Kaelyn Williamson PA-C    Visit Date: 5/26/2023    Physician Orders: PT Eval and Treat   Medical Diagnosis from Referral: right sided low back pain  Evaluation Date: 4/21/2023  Authorization Period Expiration: 12/31/23  Plan of Care Expiration: 6/21/23  Progress Note Due: 5/21/23  Visit # / Visits authorized: 6/20  FOTO: Issued Visit #: Next visit    PTA Visit #: 2/5     Time In: 0800 AM  Time Out: 0855 AM  Total Billable Time:  30 minutes    SUBJECTIVE     Pt reports: he is noticing increasing weakness in (R) LE. Patient states he has fallen 2x since last treatment session. Patient states both were related to standing on (R) LE. Patient states he is noticing increased tingling into (R) LE as well. Patient states his lower back symptoms is improving and he doesn't find himself bending over as often when he is walking the dog.  He was compliant with home exercise program.  Response to previous treatment: reduced pain  Functional change: able to walk farther    Pain: 0/10  Location: right back      OBJECTIVE     Objective Measures updated at progress report unless specified.     Treatment     Sergei received the treatments listed below:      therapeutic exercises to develop strength, endurance, ROM, and flexibility for 25 minutes including:  DKTC 2 x 10, 10 sec hold on stability ball  Lumbar flexion rollouts 30x 5" hold each, forward/lateral  Prone quad stretch 2x1 min per side (NP)  BLE shuttle squats 3x10, 75#   SL shuttle squats 2x10, 50#     Femoral nerve glides x 20  Seated edge of mat: hip ER (green TB) 2x10    manual therapy techniques: Joint mobilizations, Manual traction, Myofacial release, and Soft tissue " Mobilization were applied to the:  for 00 minutes, including:    neuromuscular re-education activities to improve: Balance, Coordination, Kinesthetic, Sense, Proprioception, and Posture for 30 minutes. The following activities were included:  Quad set x 20, 5 sec hold (cues for reduction of gluteal compensation, tapping for proper muscle recruitment)  SAQ 2x10, 3# (tapping for proper muscle recruitment)   LAQ 2x10 (utilizing contralateral for biofeedback for quad recruitment and knee extension)  Posterior pelvic tilt x 20, 5 sec hold (cues for correction of elevation)  PPT + ball squeeze x 2 minutes, 5 sec hold (NP)  PPT + dead bug from hooklying (orange SB) x 2 minutes (NP)  PPT + bridge 2x10  Prone gluteal set x 10  Prone glute set + hip extension x 10  SL stance 20 sec x 2 (B, finger tip assist)  Standing march 2x10 (B)   Standing hip abduction 2x10 (yellow TB at knees)    therapeutic activities to improve functional performance for 00 minutes, including:  Heavy education on expected functional progression    Patient Education and Home Exercises     Home Exercises Provided and Patient Education Provided     Education provided:   - range of motion expectations  - foot intrinsic retraining    Written Home Exercises Provided: Patient instructed to cont prior HEP. Exercises were reviewed and Sergei was able to demonstrate them prior to the end of the session.  Sergei demonstrated good  understanding of the education provided. See EMR under Patient Instructions for exercises provided during therapy sessions    Access Code: [email or web address]  URL: [email or web address]  Date: 05/03/2023  Prepared by: Azul Mayfield    Exercises  - Supine Double Knee to Chest  - 1 x daily - 7 x weekly - 3 sets - 10 reps - 10 sec hold  - Seated Great Toe Extension  - 1 x daily - 2 sets - 15 reps  - Seated Lesser Toes Extension  - 1 x daily - 2 sets - 15 reps  - Toe Spreading  - 1 x daily - 2 sets - 15 reps    ASSESSMENT     Sergei presenting  to clinic today with subjective reports of 2 falls since last treatment session. Patient also verbal regarding increased, progressive (R) LE muscle weakness (quad and hip specific) that he feels contributed significantly to both falls. Patient lacks (R) TKE but localized, chronic edema is noted at knee joint. Patient is able to DF against gravity without issue but heavy (R) QL compensation is noted with (R) march. (R) proximal weakness appears more evident than distal. Instructed patient to reach out to [facility] to inquire about MRI due to new onset of (R) LE muscle weakness and recent falls. Continued physical therapy intervention is medically necessary to maximize functional outcomes.    Sergei Is progressing well towards his goals.   Pt prognosis is Good.     Pt will continue to benefit from skilled outpatient physical therapy to address the deficits listed in the problem list box on initial evaluation, provide pt/family education and to maximize pt's level of independence in the home and community environment.     Pt's spiritual, cultural and educational needs considered and pt agreeable to plan of care and goals.     Anticipated barriers to physical therapy: none    Goals:   Short Term Goals: 2 weeks   Patient will report morning stiffness and pain lasting < or = 1 hour to improve quality of life.  Patient will be compliant with home exercise program.      Long Term Goals: 4 weeks   Patient will improve core strength to > or = 4+/5 to improve score.  Patient will walk his dog for 30 minutes with < or = 2/10 pain.  Patient will report < or = 2/10 pain at maximum to improve quality of life.   Patient will be independent in management.    PLAN     Continue with physical therapy to address impairments.    Nydia Camacho, PTA

## 2023-05-29 ENCOUNTER — CLINICAL SUPPORT (OUTPATIENT)
Dept: REHABILITATION | Facility: HOSPITAL | Age: 78
End: 2023-05-29
Payer: MEDICARE

## 2023-05-29 DIAGNOSIS — M54.50 ACUTE LOW BACK PAIN, UNSPECIFIED BACK PAIN LATERALITY, UNSPECIFIED WHETHER SCIATICA PRESENT: Primary | ICD-10-CM

## 2023-05-29 PROCEDURE — 97110 THERAPEUTIC EXERCISES: CPT | Mod: PO

## 2023-05-29 NOTE — PROGRESS NOTES
"[facility]   Physical Therapy Treatment Note     Name: Srinivasan Bernal III  Clinic Number: [id number]    Therapy Diagnosis:   Encounter Diagnosis   Name Primary?    Acute low back pain, unspecified back pain laterality, unspecified whether sciatica present Yes     Physician: Kaelyn Williamson PA-C    Visit Date: 5/29/2023    Physician Orders: PT Eval and Treat   Medical Diagnosis from Referral: right sided low back pain  Evaluation Date: 4/21/2023  Authorization Period Expiration: 12/31/23  Plan of Care Expiration: 6/21/23  Progress Note Due: 5/21/23  Visit # / Visits authorized: 6/20  FOTO: Issued Visit #: Next visit    PTA Visit #: 2/5     Time In: 0856 AM  Time Out: 911 AM  Total Billable Time:  15 minutes    SUBJECTIVE     Pt reports: that he woke up this morning with his back feeling pretty good. He is having progressive weakness in the leg, however, and he is very concerned about it.  He was compliant with home exercise program.  Response to previous treatment: reduced pain  Functional change: able to walk farther    Pain: 0/10  Location: right back      OBJECTIVE     Objective Measures updated at progress report unless specified.  (5/29/23)    L3: 4/5   Decreased sensation in the right leg.  Edema in right lower extremity.    Treatment     Sergei received the treatments listed below:      therapeutic exercises to develop strength, endurance, ROM, and flexibility for 15 minutes including:    Assessment    Not performed:  DKTC 2 x 10, 10 sec hold on stability ball  Lumbar flexion rollouts 30x 5" hold each, forward/lateral  Prone quad stretch 2x1 min per side (NP)  BLE shuttle squats 3x10, 75#   SL shuttle squats 2x10, 50#     Femoral nerve glides x 20  Seated edge of mat: hip ER (green TB) 2x10    manual therapy techniques: Joint mobilizations, Manual traction, Myofacial release, and Soft tissue Mobilization were applied to the:  for 00 minutes, including:    neuromuscular re-education " activities to improve: Balance, Coordination, Kinesthetic, Sense, Proprioception, and Posture for 30 minutes. The following activities were included:  Quad set x 20, 5 sec hold (cues for reduction of gluteal compensation, tapping for proper muscle recruitment)  SAQ 2x10, 3# (tapping for proper muscle recruitment)   LAQ 2x10 (utilizing contralateral for biofeedback for quad recruitment and knee extension)  Posterior pelvic tilt x 20, 5 sec hold (cues for correction of elevation)  PPT + ball squeeze x 2 minutes, 5 sec hold (NP)  PPT + dead bug from hooklying (orange SB) x 2 minutes (NP)  PPT + bridge 2x10  Prone gluteal set x 10  Prone glute set + hip extension x 10  SL stance 20 sec x 2 (B, finger tip assist)  Standing march 2x10 (B)   Standing hip abduction 2x10 (yellow TB at knees)    therapeutic activities to improve functional performance for 00 minutes, including:  Heavy education on expected functional progression    Patient Education and Home Exercises     Home Exercises Provided and Patient Education Provided     Education provided:   - range of motion expectations  - foot intrinsic retraining    Written Home Exercises Provided: Patient instructed to cont prior HEP. Exercises were reviewed and Sergei was able to demonstrate them prior to the end of the session.  Sergei demonstrated good  understanding of the education provided. See EMR under Patient Instructions for exercises provided during therapy sessions    Access Code: [email or web address]  URL: [email or web address]  Date: 05/03/2023  Prepared by: Azul Mayfield    Exercises  - Supine Double Knee to Chest  - 1 x daily - 7 x weekly - 3 sets - 10 reps - 10 sec hold  - Seated Great Toe Extension  - 1 x daily - 2 sets - 15 reps  - Seated Lesser Toes Extension  - 1 x daily - 2 sets - 15 reps  - Toe Spreading  - 1 x daily - 2 sets - 15 reps    ASSESSMENT     A marked weakness in the quadriceps was noted today vs evaluation. This along with progression in tingling and  falls indicates a need for further imaging. Patient was instructed to perform activities that do not increase lower extremity symptoms. Patient is on hold until imaging.    Sergei Is progressing well towards his goals.   Pt prognosis is Good.     Pt will continue to benefit from skilled outpatient physical therapy to address the deficits listed in the problem list box on initial evaluation, provide pt/family education and to maximize pt's level of independence in the home and community environment.     Pt's spiritual, cultural and educational needs considered and pt agreeable to plan of care and goals.     Anticipated barriers to physical therapy: none    Goals:   Short Term Goals: 2 weeks   Patient will report morning stiffness and pain lasting < or = 1 hour to improve quality of life.  Patient will be compliant with home exercise program.      Long Term Goals: 4 weeks   Patient will improve core strength to > or = 4+/5 to improve score.  Patient will walk his dog for 30 minutes with < or = 2/10 pain.  Patient will report < or = 2/10 pain at maximum to improve quality of life.   Patient will be independent in management.    PLAN     Continue with physical therapy to address impairments.    Azul Mayfield, PT

## 2023-05-30 ENCOUNTER — TELEPHONE (OUTPATIENT)
Dept: PAIN MEDICINE | Facility: CLINIC | Age: 78
End: 2023-05-30
Payer: MEDICARE

## 2023-05-30 DIAGNOSIS — M43.10 SPONDYLOLISTHESIS, UNSPECIFIED SPINAL REGION: ICD-10-CM

## 2023-05-30 DIAGNOSIS — M54.50 ACUTE RIGHT-SIDED LOW BACK PAIN WITHOUT SCIATICA: ICD-10-CM

## 2023-05-30 DIAGNOSIS — M54.9 DORSALGIA, UNSPECIFIED: ICD-10-CM

## 2023-05-30 DIAGNOSIS — M54.16 LUMBAR RADICULOPATHY, CHRONIC: ICD-10-CM

## 2023-05-30 NOTE — TELEPHONE ENCOUNTER
Returned pt call. Pt stated that he is unsure if he is able to get an MRI with his stent. I gave pt the number to schedule the MRI at [facility] as they have a better understanding of which stents are MRI compatible. Pt understood.

## 2023-05-30 NOTE — TELEPHONE ENCOUNTER
----- Message from George Argueta sent at 5/30/2023 12:02 PM CDT -----  Regarding: Return Call  Contact: Patient  Type:  Patient Returning Call    Who Called:Patient  Who Left Message for Patient:office nurse  Does the patient know what this is regarding?:unknown  Would the patient rather a call back or a response via [facility]? Call  Best Call Back Number:[phone]  Additional Information: Please call patient.

## 2023-05-30 NOTE — TELEPHONE ENCOUNTER
Informed pt that Kaelyn Williamson PA-C stated Recommend MRI lumbar spine to further assess.  Needs clinic follow up after MRI. Pt stated he just got a stent placed and wants to make sure that his cardiologist says its ok to get the MRI. Pt stated that he would call back.

## 2023-05-30 NOTE — TELEPHONE ENCOUNTER
See PT note.    Recommend MRI lumbar spine to further assess.  Needs clinic follow up after MRI.  We will determine if orthopedic referral is needed after review of MRI.    Thanks.

## 2023-05-30 NOTE — TELEPHONE ENCOUNTER
----- Message from Azul Mayfield, PT sent at 5/29/2023  9:09 AM CDT -----  Regarding: Imaging Referral  Hi Kaelyn,    I have been working with Sergei, and in the last couple weeks, he has had a rapid onset of quad weakness and progressive tingling resulting in 2 falls and an inability to climb stairs. I would like to request imaging of the back and possibly the knee (chronic edema since knee replacement) if you are agreeable. I am planning to put him on hold for physical therapy until he can have further work up.    Thank you!    Azul Mayfield

## 2023-06-05 ENCOUNTER — PATIENT MESSAGE (OUTPATIENT)
Dept: PAIN MEDICINE | Facility: CLINIC | Age: 78
End: 2023-06-05
Payer: MEDICARE

## 2023-06-05 DIAGNOSIS — G89.29 CHRONIC KNEE PAIN, UNSPECIFIED LATERALITY: Primary | ICD-10-CM

## 2023-06-05 DIAGNOSIS — M25.569 CHRONIC KNEE PAIN, UNSPECIFIED LATERALITY: Primary | ICD-10-CM

## 2023-06-07 ENCOUNTER — HOSPITAL ENCOUNTER (OUTPATIENT)
Dept: RADIOLOGY | Facility: HOSPITAL | Age: 78
Discharge: HOME | End: 2023-06-07
Attending: PHYSICIAN ASSISTANT
Payer: MEDICARE

## 2023-06-07 ENCOUNTER — PATIENT MESSAGE (OUTPATIENT)
Dept: PAIN MEDICINE | Facility: CLINIC | Age: 78
End: 2023-06-07
Payer: MEDICARE

## 2023-06-07 DIAGNOSIS — G89.29 CHRONIC KNEE PAIN, UNSPECIFIED LATERALITY: ICD-10-CM

## 2023-06-07 DIAGNOSIS — M25.569 CHRONIC KNEE PAIN, UNSPECIFIED LATERALITY: ICD-10-CM

## 2023-06-07 PROCEDURE — 73562 X-RAY EXAM OF KNEE 3: CPT | Mod: 26,50,, | Performed by: RADIOLOGY

## 2023-06-07 PROCEDURE — 73562 XR KNEE 3 VIEW BILATERAL: ICD-10-PCS | Mod: 26,50,, | Performed by: RADIOLOGY

## 2023-06-07 PROCEDURE — 73562 X-RAY EXAM OF KNEE 3: CPT | Mod: TC,50,FY,PO

## 2023-06-07 NOTE — TELEPHONE ENCOUNTER
Dr. Goldberg/ staff -     Please see knee xray results from today.  Mr. Mai is asking for an appointment as soon as possible to discuss knee pain and weakness with you further.  I am also assessing his lumbar spine with an MRI as a potential source.    Xray kne 6-7-23:  The bones are osteopenic.  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  There is nonspecific lucency present beneath the distal right femoral component, and component loosening/infection cannot be excluded.  The left knee medial tibiofemoral hardware is anatomically aligned without complicating feature appreciated radiographically.  There is a moderate left knee joint effusion and/or synovial hypertrophy.  There is subcortical cyst formation observed no fracture, dislocation, or osseous destructive process appreciated radiographically.  There is extensive capsular calcification noted along the posterior margin of the right knee joint.  There is a 9.4 cm calcified density present overlying the left suprapatellar recess.  These findings could relate to CPPD/pyrophosphate arthropathy.  No acute fracture.      Thanks  Kaelyn Williamson PA-C  [facility]

## 2023-06-08 NOTE — TELEPHONE ENCOUNTER
I spoke with Mr. Bernal and scheduled him an appointment to follow up with Kaelyn Williamson tomorrow for results of the MRI, he indicated understanding.

## 2023-06-09 ENCOUNTER — HOSPITAL ENCOUNTER (OUTPATIENT)
Dept: RADIOLOGY | Facility: HOSPITAL | Age: 78
Discharge: HOME | End: 2023-06-09
Attending: PHYSICIAN ASSISTANT
Payer: MEDICARE

## 2023-06-09 ENCOUNTER — OFFICE VISIT (OUTPATIENT)
Dept: PAIN MEDICINE | Facility: CLINIC | Age: 78
End: 2023-06-09
Payer: MEDICARE

## 2023-06-09 VITALS
HEIGHT: 72 IN | DIASTOLIC BLOOD PRESSURE: 61 MMHG | BODY MASS INDEX: 23.53 KG/M2 | SYSTOLIC BLOOD PRESSURE: 107 MMHG | WEIGHT: 173.75 LBS | HEART RATE: 81 BPM

## 2023-06-09 DIAGNOSIS — M48.061 SPINAL STENOSIS OF LUMBAR REGION, UNSPECIFIED WHETHER NEUROGENIC CLAUDICATION PRESENT: ICD-10-CM

## 2023-06-09 DIAGNOSIS — M43.10 SPONDYLOLISTHESIS, UNSPECIFIED SPINAL REGION: Primary | ICD-10-CM

## 2023-06-09 DIAGNOSIS — M43.10 SPONDYLOLISTHESIS, UNSPECIFIED SPINAL REGION: ICD-10-CM

## 2023-06-09 PROCEDURE — 72114 X-RAY EXAM L-S SPINE BENDING: CPT | Mod: 26,,, | Performed by: RADIOLOGY

## 2023-06-09 PROCEDURE — 72114 X-RAY EXAM L-S SPINE BENDING: CPT | Mod: TC,PO

## 2023-06-09 PROCEDURE — 99999 PR PBB SHADOW E&M-EST. PATIENT-LVL III: ICD-10-PCS | Mod: PBBFAC,,, | Performed by: PHYSICIAN ASSISTANT

## 2023-06-09 PROCEDURE — 1160F PR REVIEW ALL MEDS BY PRESCRIBER/CLIN PHARMACIST DOCUMENTED: ICD-10-PCS | Mod: CPTII,S$GLB,, | Performed by: PHYSICIAN ASSISTANT

## 2023-06-09 PROCEDURE — 3078F DIAST BP <80 MM HG: CPT | Mod: CPTII,S$GLB,, | Performed by: PHYSICIAN ASSISTANT

## 2023-06-09 PROCEDURE — 1159F PR MEDICATION LIST DOCUMENTED IN MEDICAL RECORD: ICD-10-PCS | Mod: CPTII,S$GLB,, | Performed by: PHYSICIAN ASSISTANT

## 2023-06-09 PROCEDURE — 1160F RVW MEDS BY RX/DR IN RCRD: CPT | Mod: CPTII,S$GLB,, | Performed by: PHYSICIAN ASSISTANT

## 2023-06-09 PROCEDURE — 3074F SYST BP LT 130 MM HG: CPT | Mod: CPTII,S$GLB,, | Performed by: PHYSICIAN ASSISTANT

## 2023-06-09 PROCEDURE — 3288F FALL RISK ASSESSMENT DOCD: CPT | Mod: CPTII,S$GLB,, | Performed by: PHYSICIAN ASSISTANT

## 2023-06-09 PROCEDURE — 1100F PR PT FALLS ASSESS DOC 2+ FALLS/FALL W/INJURY/YR: ICD-10-PCS | Mod: CPTII,S$GLB,, | Performed by: PHYSICIAN ASSISTANT

## 2023-06-09 PROCEDURE — 1125F AMNT PAIN NOTED PAIN PRSNT: CPT | Mod: CPTII,S$GLB,, | Performed by: PHYSICIAN ASSISTANT

## 2023-06-09 PROCEDURE — 1100F PTFALLS ASSESS-DOCD GE2>/YR: CPT | Mod: CPTII,S$GLB,, | Performed by: PHYSICIAN ASSISTANT

## 2023-06-09 PROCEDURE — 99214 OFFICE O/P EST MOD 30 MIN: CPT | Mod: S$GLB,,, | Performed by: PHYSICIAN ASSISTANT

## 2023-06-09 PROCEDURE — 1159F MED LIST DOCD IN RCRD: CPT | Mod: CPTII,S$GLB,, | Performed by: PHYSICIAN ASSISTANT

## 2023-06-09 PROCEDURE — 1125F PR PAIN SEVERITY QUANTIFIED, PAIN PRESENT: ICD-10-PCS | Mod: CPTII,S$GLB,, | Performed by: PHYSICIAN ASSISTANT

## 2023-06-09 PROCEDURE — 72114 XR LUMBAR SPINE 5 VIEW WITH FLEX AND EXT: ICD-10-PCS | Mod: 26,,, | Performed by: RADIOLOGY

## 2023-06-09 PROCEDURE — 3074F PR MOST RECENT SYSTOLIC BLOOD PRESSURE < 130 MM HG: ICD-10-PCS | Mod: CPTII,S$GLB,, | Performed by: PHYSICIAN ASSISTANT

## 2023-06-09 PROCEDURE — 99214 PR OFFICE/OUTPT VISIT, EST, LEVL IV, 30-39 MIN: ICD-10-PCS | Mod: S$GLB,,, | Performed by: PHYSICIAN ASSISTANT

## 2023-06-09 PROCEDURE — 99999 PR PBB SHADOW E&M-EST. PATIENT-LVL III: CPT | Mod: PBBFAC,,, | Performed by: PHYSICIAN ASSISTANT

## 2023-06-09 PROCEDURE — 3288F PR FALLS RISK ASSESSMENT DOCUMENTED: ICD-10-PCS | Mod: CPTII,S$GLB,, | Performed by: PHYSICIAN ASSISTANT

## 2023-06-09 PROCEDURE — 3078F PR MOST RECENT DIASTOLIC BLOOD PRESSURE < 80 MM HG: ICD-10-PCS | Mod: CPTII,S$GLB,, | Performed by: PHYSICIAN ASSISTANT

## 2023-06-09 NOTE — PROGRESS NOTES
[facility]      PCP:   Ray Anna MD    CC:   Chief Complaint   Patient presents with    Follow-up     MRI results for lbp.      No flowsheet data found.      HPI:     Mr. Mai returns for follow up of back pain after PT.  Last seen right lower back and buttock pain that increased with walking and improved with sitting.  He compelded medrol taper and has been working with PT.  While PT has helped improve, but not resolve right lower back pain, he has developed weakness in the right quadriceps.  PT contacted me 5/29/23 about acute onset weakness in the right quadriceps.  Mr. Mai has noticed diffculty stepping up onto a curb because of the weakness.    He has some history of right knee welling since a replacement; he had an xray of the knee and is scheduled to see orthopedics soon for further assessment.     Initial HPI:  Srinviasan Bernal III is a 78 y.o. male with history of CAD presents with lower back pain.  He has had similar pain in 2007 and 2011 with benefit from PT in the past.  Current pain started 3/22/23 without triggering event.  He has pain in the right lower lumbar region/ buttock.  No radicular leg pain, numbness or tignling.  Pain is worse with walking and improves with sitting.  No pain with laying down.  Has tried ibuprofen.    He had MRI and xray in the past showing right L5 pars deffect and anterolisthesis of L5 on S1.      Past and current medications:  Antineuropathics:  NSAIDs:  ibuprofen  Antidepressants:  Muscle relaxers:  Opioids:  Antiplatelets/Anticoagulants:  ASA  Others: completed medrol taper    Physical therapy/ Chiropractic care:  PT in the past and currently going with benefit    Pain Intervention History:  none    Past Spine Surgical History:  none      History:    Current Outpatient Medications:     aspirin (ECOTRIN) 81 MG EC tablet, Take 81 mg by mouth once daily., Disp: , Rfl:     atorvastatin (LIPITOR) 80 MG tablet, Take 1 tablet (80 mg total) by mouth  once daily., Disp: 90 tablet, Rfl: 3    ezetimibe (ZETIA) 10 mg tablet, Take 1 tablet (10 mg total) by mouth once daily., Disp: 90 tablet, Rfl: 3    ibuprofen (ADVIL,MOTRIN) 200 MG tablet, Take 200 mg by mouth every 6 (six) hours as needed for Pain., Disp: , Rfl:     perindopril erbumine (ACEON) 4 mg tablet, Take 1 tablet (4 mg total) by mouth once daily., Disp: 90 tablet, Rfl: 3    azithromycin (ZITHROMAX Z-PROSPER) 250 MG tablet, Take 2 po day 1, then 1 po day 2 - 5 (Patient not taking: Reported on 6/9/2023), Disp: 6 tablet, Rfl: 0    benzonatate (TESSALON) 100 MG capsule, 1 - 2 po every 6 hours prn cough (Patient not taking: Reported on 6/9/2023), Disp: 45 capsule, Rfl: 0    methocarbamoL (ROBAXIN) 750 MG Tab, Take 1 tablet (750 mg total) by mouth 4 (four) times daily as needed. (Patient not taking: Reported on 4/13/2023), Disp: 44 tablet, Rfl: 3    tadalafiL (CIALIS) 20 MG Tab, Take 1 tablet (20 mg total) by mouth once daily. (Patient not taking: Reported on 7/6/2022), Disp: 30 tablet, Rfl: 11    Past Medical History:   Diagnosis Date    Cataracts, bilateral     2009, 2013    Coronary artery disease 2004    Hypercholesteremia     Kidney stone 2015    Urerteroscopy    Skin cancer 2015    left ankle/ left ear       Past Surgical History:   Procedure Laterality Date    ANKLE FUSION      CIRCUMCISION      COLONOSCOPY N/A 9/20/2022    Procedure: COLONOSCOPY;  Surgeon: Natasha Sommer MD;  Location: [facility];  Service: Endoscopy;  Laterality: N/A;    FOOT SURGERY      HERNIA REPAIR Left 2008    KNEE ARTHROSCOPY Left 2010    KNEE ARTHROSCOPY Right 2010    KNEE SURGERY Left 2010    partial replacement    KNEE SURGERY Right 2012    replacement    MOUTH SURGERY      TOE SURGERY      TONSILLECTOMY         Family History   Problem Relation Age of Onset    Heart disease Father     Dementia Mother        Social History     Socioeconomic History    Marital status:    Tobacco Use    Smoking status: Some Days     Types:  Cigars    Smokeless tobacco: Never   Substance and Sexual Activity    Alcohol use: Yes     Comment: rarely    Drug use: No    Sexual activity: Yes     Social Determinants of Health     Financial Resource Strain: Low Risk     Difficulty of Paying Living Expenses: Not hard at all   Food Insecurity: No Food Insecurity    Worried About Running Out of Food in the Last Year: Never true    Ran Out of Food in the Last Year: Never true   Transportation Needs: No Transportation Needs    Lack of Transportation (Medical): No    Lack of Transportation (Non-Medical): No   Physical Activity: Sufficiently Active    Days of Exercise per Week: 7 days    Minutes of Exercise per Session: 60 min   Stress: No Stress Concern Present    Feeling of Stress : Not at all   Social Connections: Unknown    Frequency of Communication with Friends and Family: More than three times a week    Frequency of Social Gatherings with Friends and Family: More than three times a week    Active Member of Clubs or Organizations: Yes    Attends Club or Organization Meetings: More than 4 times per year    Marital Status:    Housing Stability: Low Risk     Unable to Pay for Housing in the Last Year: No    Number of Places Lived in the Last Year: 1    Unstable Housing in the Last Year: No       Review of patient's allergies indicates:  No Known Allergies    Review of Systems:  Right low back/ buttock pain.  Balance of review of systems is negative.    Physical Exam:  Vitals:    06/09/23 1032   BP: 107/61   Pulse: 81   Weight: 78.8 kg (173 lb 11.6 oz)   Height: 6' (1.829 m)   PainSc:   2   PainLoc: Back     Body mass index is 23.56 kg/m².    Gen: NAD  Psych: mood appropriate for given condition  HEENT: eyes anicteric   CV: RRR, 2+ radial pulse  HEENT: anicteric   Respiratory: non-labored, no signs of respiratory distress  Abd: non-distended  Skin: warm, dry and intact.  Gait: Able to heel walk, toe walk. No antalgic gait.     Coordination:   Romberg:  negative  Finger to nose coordination: normal  Heel to shin coordination: normal  Tandem walking coordination: normal    Cervical spine:   ROM is full in flexion, extension and lateral rotation without increased pain.  Spurling's maneuver causes no neck pain to either side.  Myofascial exam: No Tenderness to palpation across cervical paraspinous region bilaterally.    Lumbar spine:   ROM is full with flexion extension and oblique extension with no increased pain.    Michoacano's test causes no increased pain on either side.    Supine straight leg raise is negative bilaterally.    Internal and external rotation of the hip causes no increased pain on either side.  Myofascial exam: No tenderness to palpation across lumbar paraspinous muscles. No tenderness to palpation over the bilateral greater trochanters and bilateral SI joint    Sensory:  Intact and symmetrical to light touch in C4-T1 dermatomes bilaterally. Intact and symmetrical to light touch in L1-S1 dermatomes bilaterally.    Motor:    Right Left   C4 Shoulder Abduction  5  5   C5 Elbow Flexion    5  5   C6 Wrist Extension  5  5   C7 Elbow Extension   5  5   C8/T1 Hand Intrinsics   5  5        Right Left   L2/3 Iliacus Hip flexion  4  5   L3/4 Qudratus Femoris Knee Extension  5  5   L4/5 Tib Anterior Ankle Dorsiflexion   5  5   L5/S1 Extensor Hallicus Longus Great toe extension  5  5   S1/S2 Gastroc/Soleus Plantar Flexion  5  5      Right Left   Triceps DTR 1+ 1+   Biceps DTR 1+ 1+   Brachioradialis DTR 1+ 1+   Patellar DTR 0+ 0+   Achilles DTR 0+ 0+   Ramirez Absent  Absent   Clonus Absent Absent   Babinski Absent Absent     Imaging:    Xray lumbar spine report 11-:  multilevel lumbar sponylosis with subtle degenerative retrolisthesis of L1 on L2 and atnerolisthesis of L4 on L5.  Chronic compression deformity invlolving L1 superior endplate with very minimal loss of stature.    MRI lumbar spine report 5-:  multilevel spondylosis in the lumbar  spine.  Severe facet degenerative arthropathy bilaterally at L4/5.  Severe DDD at L5/S1 and Grade 1 anterolisthesis of L5 on S1.  Right pars defect of L5 and possible left sided pars defect. No disc herniation at any level.    Xray knees 6-7-23:  The bones are osteopenic.  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  There is nonspecific lucency present beneath the distal right femoral component, and component loosening/infection cannot be excluded.  The left knee medial tibiofemoral hardware is anatomically aligned without complicating feature appreciated radiographically.  There is a moderate left knee joint effusion and/or synovial hypertrophy.  There is subcortical cyst formation observed no fracture, dislocation, or osseous destructive process appreciated radiographically.  There is extensive capsular calcification noted along the posterior margin of the right knee joint.  There is a 9.4 cm calcified density present overlying the left suprapatellar recess.  These findings could relate to CPPD/pyrophosphate arthropathy.  No acute fracture.    MRI lumbar spine 6-8-23:  multilevel degenerative chagnes.  L1 retrolisthesis on L2.  L2/3 retrolisthesis, facet hypertrophy, ligamentous hypertrophy with severe central canal narrowing and occlusion of the thecal sac.  L3/4 broad based disk with right greater than left facet hypertrophy with severe right foraminal narrowing and moderate central canal narrowing.  L4 anterolisthesis on L5 with broad based disk and moderate central canal narrowing.  L5/S1 degenerative disk dessication.     Labs:  No results found for: LABA1C, HGBA1C    Lab Results   Component Value Date    WBC 9.11 03/18/2023    HGB 14.1 03/18/2023    HCT 41.4 03/18/2023    MCV 90 03/18/2023     03/18/2023       Assessment:     Mr. Mai has known spondylolisthesis in the lumbar spine.  He has acute onset right  buttock pain - myofascial vs referred from degeneratvie changes.  Acute onset weakness in the right hip flexors due to L2/3 severe central canal stenosis.  Still significant, but less stenosis at L3/4, L4/5.  Given weakness will refer to neurosurgery.  Will get dynamic xrays prior to neurosurgery visits.  Continue with orthopedic follow up as well.    Follow Up: RTC as needed..    : Not applicable      Problem List Items Addressed This Visit    None                    Kaelyn Williamson PA-C  [facility]      This note was completed with dictation software and grammatical errors may exist.

## 2023-06-22 ENCOUNTER — OFFICE VISIT (OUTPATIENT)
Dept: NEUROSURGERY | Facility: CLINIC | Age: 78
End: 2023-06-22
Payer: MEDICARE

## 2023-06-22 VITALS
SYSTOLIC BLOOD PRESSURE: 110 MMHG | HEART RATE: 69 BPM | DIASTOLIC BLOOD PRESSURE: 71 MMHG | RESPIRATION RATE: 18 BRPM | BODY MASS INDEX: 23.43 KG/M2 | WEIGHT: 173 LBS | HEIGHT: 72 IN

## 2023-06-22 DIAGNOSIS — M43.10 SPONDYLOLISTHESIS, UNSPECIFIED SPINAL REGION: ICD-10-CM

## 2023-06-22 DIAGNOSIS — M48.061 SPINAL STENOSIS OF LUMBAR REGION, UNSPECIFIED WHETHER NEUROGENIC CLAUDICATION PRESENT: ICD-10-CM

## 2023-06-22 PROBLEM — I25.10 CORONARY ARTERIOSCLEROSIS IN NATIVE ARTERY: Status: ACTIVE | Noted: 2019-11-13

## 2023-06-22 PROBLEM — E78.00 HYPERCHOLESTEROLEMIA: Status: ACTIVE | Noted: 2019-11-13

## 2023-06-22 PROBLEM — I34.0 MITRAL VALVE REGURGITATION: Status: ACTIVE | Noted: 2021-11-10

## 2023-06-22 PROBLEM — I10 ESSENTIAL HYPERTENSION: Status: ACTIVE | Noted: 2019-11-13

## 2023-06-22 PROCEDURE — 1100F PR PT FALLS ASSESS DOC 2+ FALLS/FALL W/INJURY/YR: ICD-10-PCS | Mod: HCNC,CPTII,S$GLB, | Performed by: STUDENT IN AN ORGANIZED HEALTH CARE EDUCATION/TRAINING PROGRAM

## 2023-06-22 PROCEDURE — 99204 PR OFFICE/OUTPT VISIT, NEW, LEVL IV, 45-59 MIN: ICD-10-PCS | Mod: HCNC,S$GLB,, | Performed by: STUDENT IN AN ORGANIZED HEALTH CARE EDUCATION/TRAINING PROGRAM

## 2023-06-22 PROCEDURE — 1159F PR MEDICATION LIST DOCUMENTED IN MEDICAL RECORD: ICD-10-PCS | Mod: HCNC,CPTII,S$GLB, | Performed by: STUDENT IN AN ORGANIZED HEALTH CARE EDUCATION/TRAINING PROGRAM

## 2023-06-22 PROCEDURE — 1125F AMNT PAIN NOTED PAIN PRSNT: CPT | Mod: HCNC,CPTII,S$GLB, | Performed by: STUDENT IN AN ORGANIZED HEALTH CARE EDUCATION/TRAINING PROGRAM

## 2023-06-22 PROCEDURE — 3288F PR FALLS RISK ASSESSMENT DOCUMENTED: ICD-10-PCS | Mod: HCNC,CPTII,S$GLB, | Performed by: STUDENT IN AN ORGANIZED HEALTH CARE EDUCATION/TRAINING PROGRAM

## 2023-06-22 PROCEDURE — 1100F PTFALLS ASSESS-DOCD GE2>/YR: CPT | Mod: HCNC,CPTII,S$GLB, | Performed by: STUDENT IN AN ORGANIZED HEALTH CARE EDUCATION/TRAINING PROGRAM

## 2023-06-22 PROCEDURE — 99204 OFFICE O/P NEW MOD 45 MIN: CPT | Mod: HCNC,S$GLB,, | Performed by: STUDENT IN AN ORGANIZED HEALTH CARE EDUCATION/TRAINING PROGRAM

## 2023-06-22 PROCEDURE — 3074F PR MOST RECENT SYSTOLIC BLOOD PRESSURE < 130 MM HG: ICD-10-PCS | Mod: HCNC,CPTII,S$GLB, | Performed by: STUDENT IN AN ORGANIZED HEALTH CARE EDUCATION/TRAINING PROGRAM

## 2023-06-22 PROCEDURE — 1159F MED LIST DOCD IN RCRD: CPT | Mod: HCNC,CPTII,S$GLB, | Performed by: STUDENT IN AN ORGANIZED HEALTH CARE EDUCATION/TRAINING PROGRAM

## 2023-06-22 PROCEDURE — 3078F DIAST BP <80 MM HG: CPT | Mod: HCNC,CPTII,S$GLB, | Performed by: STUDENT IN AN ORGANIZED HEALTH CARE EDUCATION/TRAINING PROGRAM

## 2023-06-22 PROCEDURE — 3078F PR MOST RECENT DIASTOLIC BLOOD PRESSURE < 80 MM HG: ICD-10-PCS | Mod: HCNC,CPTII,S$GLB, | Performed by: STUDENT IN AN ORGANIZED HEALTH CARE EDUCATION/TRAINING PROGRAM

## 2023-06-22 PROCEDURE — 3074F SYST BP LT 130 MM HG: CPT | Mod: HCNC,CPTII,S$GLB, | Performed by: STUDENT IN AN ORGANIZED HEALTH CARE EDUCATION/TRAINING PROGRAM

## 2023-06-22 PROCEDURE — 1125F PR PAIN SEVERITY QUANTIFIED, PAIN PRESENT: ICD-10-PCS | Mod: HCNC,CPTII,S$GLB, | Performed by: STUDENT IN AN ORGANIZED HEALTH CARE EDUCATION/TRAINING PROGRAM

## 2023-06-22 PROCEDURE — 3288F FALL RISK ASSESSMENT DOCD: CPT | Mod: HCNC,CPTII,S$GLB, | Performed by: STUDENT IN AN ORGANIZED HEALTH CARE EDUCATION/TRAINING PROGRAM

## 2023-06-22 NOTE — PROGRESS NOTES
[facility] - [place]  Clinic Consult     Consult Requested By: Kaelyn Williamson PA-C  PCP: Ray Anna MD    SUBJECTIVE:     Chief Complaint:   Chief Complaint   Patient presents with    Lumbar Spine Pain (L-Spine)     Patient present to clinic today as back pain. States years with pain on/off symptoms. Weakness/tingling of Rt leg. Multiple falls       History of Present Illness:  Srinivasan Bernal III is a 78 y.o. male with HTN, CAD who presents for evaluation of back and leg pain. Patient reports episodes of back pain historically. He reports this episode began approximately 6 months ago. The pain is present in the right low back. He reports tingling in the right L4 distribution. He also notes difficulties climbing stairs due to weakness in the right leg. The knee will give out and causes him to fall. He is currently attending PT with significant improvement in low back pain.     Pertinent and recent history, provider evaluations, imaging and data reviewed in EPIC        Past Medical History:   Diagnosis Date    Cataracts, bilateral     2009, 2013    Coronary artery disease 2004    Hypercholesteremia     Kidney stone 2015    Urerteroscopy    Skin cancer 2015    left ankle/ left ear     Past Surgical History:   Procedure Laterality Date    ANKLE FUSION      CIRCUMCISION      COLONOSCOPY N/A 9/20/2022    Procedure: COLONOSCOPY;  Surgeon: Natasha Sommer MD;  Location: [facility];  Service: Endoscopy;  Laterality: N/A;    FOOT SURGERY      HERNIA REPAIR Left 2008    KNEE ARTHROSCOPY Left 2010    KNEE ARTHROSCOPY Right 2010    KNEE SURGERY Left 2010    partial replacement    KNEE SURGERY Right 2012    replacement    MOUTH SURGERY      TOE SURGERY      TONSILLECTOMY       Family History   Problem Relation Age of Onset    Heart disease Father     Dementia Mother      Social History     Tobacco Use    Smoking status: Some Days     Types: Cigars    Smokeless tobacco: Never    Substance Use Topics    Alcohol use: Yes     Comment: rarely    Drug use: No      Review of patient's allergies indicates:  No Known Allergies    Current Outpatient Medications:     aspirin (ECOTRIN) 81 MG EC tablet, Take 81 mg by mouth once daily., Disp: , Rfl:     atorvastatin (LIPITOR) 80 MG tablet, Take 1 tablet (80 mg total) by mouth once daily., Disp: 90 tablet, Rfl: 3    ezetimibe (ZETIA) 10 mg tablet, Take 1 tablet (10 mg total) by mouth once daily., Disp: 90 tablet, Rfl: 3    ibuprofen (ADVIL,MOTRIN) 200 MG tablet, Take 200 mg by mouth every 6 (six) hours as needed for Pain., Disp: , Rfl:     methocarbamoL (ROBAXIN) 750 MG Tab, Take 1 tablet (750 mg total) by mouth 4 (four) times daily as needed., Disp: 44 tablet, Rfl: 3    perindopril erbumine (ACEON) 4 mg tablet, Take 1 tablet (4 mg total) by mouth once daily., Disp: 90 tablet, Rfl: 3    azithromycin (ZITHROMAX Z-PROSPER) 250 MG tablet, Take 2 po day 1, then 1 po day 2 - 5 (Patient not taking: Reported on 6/9/2023), Disp: 6 tablet, Rfl: 0    benzonatate (TESSALON) 100 MG capsule, 1 - 2 po every 6 hours prn cough (Patient not taking: Reported on 6/9/2023), Disp: 45 capsule, Rfl: 0    tadalafiL (CIALIS) 20 MG Tab, Take 1 tablet (20 mg total) by mouth once daily. (Patient not taking: Reported on 7/6/2022), Disp: 30 tablet, Rfl: 11    Review of Systems:   Constitutional: no fever, chills or night sweats. No changes in weight   Eyes: no visual changes   ENT: no nasal congestion or sore throat   Respiratory: no cough or shortness of breath   Cardiovascular: no chest pain or palpitations   Gastrointestinal: no nausea or vomiting   Genitourinary: no hematuria or dysuria   Integument/Breast: no rash or pruritis   Hematologic/Lymphatic: no easy bruising or lymphadenopathy   Musculoskeletal: +back pain   Neurological: no seizures or tremors +paresthesias, weakness   Behavioral/Psych: no auditory or visual hallucinations   Endocrine: no heat or cold intolerance          OBJECTIVE:     Vital Signs (Most Recent):  Pulse: 69 (06/22/23 0939)  Resp: 18 (06/22/23 0939)  BP: 110/71 (06/22/23 0939)  Estimated body mass index is 23.46 kg/m² as calculated from the following:    Height as of this encounter: 6' (1.829 m).    Weight as of this encounter: 78.5 kg (173 lb).    Physical Exam:   General: well developed, well nourished, no distress   Neurologic: Alert and oriented. Thought content appropriate. GCS 15.   Head: normocephalic, atraumatic  Eyes: EOMI  Neck: trachea midline, no JVD   Cardiovascular: no LE edema  Pulmonary: normal respirations, no signs of respiratory distress  Abdomen: non-distended  Sensory: intact to light touch throughout  Skin: Skin is warm, dry and intact    Motor Strength: Moves all extremities spontaneously with good tone. No abnormal movements seen.       Deltoids Triceps Biceps Wrist Extension Wrist Flexion Hand    Upper: R 5/5 5/5 5/5 5/5 5/5 5/5    L 5/5 5/5 5/5 5/5 5/5 5/5     Iliopsoas Quadriceps Knee  Flexion Tibialis  anterior Gastro- cnemius EHL   Lower: R 5/5 5/5 5/5 5/5 5/5 5/5    L 4+/5 4+/5 5/5 * * 5/5     *ankle is fused    DTR's: 0 LE    Gait: normal            Diagnostic Results:  I have independently reviewed the following imaging:  MRI lumbar spine  FINDINGS:  Exaggerated generalized lumbar lordotic curvature L2-L5.  Kyphotic curvature T11-L2.  No acute fracture or destructive lesion evident.  Distal spinal cord and conus are grossly normal.     T11-T12 and T12-L1 demonstrate moderate disc space narrowing and mild disc bulge.  There is facet arthrosis at T11-T12 and mild foraminal narrowing.     L1-L2 severe disc space narrowing with near complete obliteration of the disc space, marginal retrolisthesis and osteophytic change along the endplate margin, mild moderate foraminal narrowing.     L2-L3 demonstrates moderate severe disc space narrowing, marginal retrolisthesis, moderate osteophytic change and desiccated disc bulge, moderate  facet arthrosis, prominent ligament flavum thickening, moderate severe spinal canal narrowing with reduction of the AP diameter to 3-4 mm and near complete effacement of the CSF, moderate severe foraminal narrowing with nerve root distortion.     L3-L4 moderate disc space narrowing, desiccation, moderate severe disc bulge, moderate bilateral facet arthrosis with prominent ligament flavum thickening, mild moderate spinal canal narrowing reduction of the AP diameter to 5-6 mm, severe right-sided foraminal narrowing with nerve root impingement, asymmetric right-sided severe disc bulging.     L4-5 demonstrates severe disc space narrowing, 3-4 mm anterolisthesis, severe disc bulge with uncovering of the disc, severe facet arthrosis with osteophytic hypertrophy, bilateral lateral recess narrowing with suspected L5 nerve root impingement bilaterally, severe right-sided foraminal narrowing with nerve root impingement, moderate severe left foraminal narrowing with nerve root impingement.     L5-S1 demonstrates solid osseous fusion, anterolisthesis, moderate foraminal narrowing with nerve root distortion bilaterally.     Intra-abdominal organs and paraspinal soft tissues are normal.     Impression:     L2-L3 severe disc bulging, prominent ligament flavum thickening, moderate severe spinal canal narrowing with near complete effacement of the CSF.     L3-L4 severe disc bulge, mild moderate spinal canal narrowing, ligament flavum thickening with posterior prominent epidural fat.     Multilevel foraminal narrowing including nerve root impingement at L3-L4 on the right, nerve root impingement at L4-5 bilaterally on the right greater than left.     Spinal curvature as above.        Electronically signed by: Patrick Solares MD  Date:                                            06/09/2023  Time:                                           09:36    ASSESSMENT/PLAN:     Spondylolisthesis, unspecified spinal region  -     Ambulatory  referral/consult to Neurosurgery    Spinal stenosis of lumbar region, unspecified whether neurogenic claudication present  -     Ambulatory referral/consult to Neurosurgery        Srinivasan Bernal III is a 78 y.o. male  Male with many flare up over the decades  Recently with back pain , right leg pain and weakness  He was really struggling but much better and more mobile with Pt  He has chronic diffuse degeneration with moderate stenosis , multilevel  No focal target for surgery  And doing better  He seems to still have a component of L4/5 radiculopathy and wants  to consider a MARITZA- pain mg refer all    Also he has some imbalance, instablility and subjective right IP and quad weakness     - will re-engage PT for balance, gait and right quad stim/strenthening per Pt eval and tx recs      Wants to proceed and maximize conservative tx    We discussed non-surgical treatment options.  We discussed medical management and referral options including anti-inflammatories, muscle relaxants, narcotics and neuropathic pain medications.  We discussed physical therapy for back strengthening and flexibility.  We talked about injection therapy for both treatment and diagnosis.         Patient verbalized understanding of plan. Encouraged to call with any questions or concerns.     This note was partially dictated using voice recognition software, so please excuse any errors that were not corrected.

## 2023-06-24 DIAGNOSIS — E78.5 DYSLIPIDEMIA: ICD-10-CM

## 2023-06-24 DIAGNOSIS — I25.10 CORONARY ARTERY DISEASE: ICD-10-CM

## 2023-06-24 DIAGNOSIS — I10 ESSENTIAL HYPERTENSION: ICD-10-CM

## 2023-06-24 RX ORDER — ATORVASTATIN CALCIUM 80 MG/1
TABLET, FILM COATED ORAL
Qty: 90 TABLET | Refills: 1 | Status: SHIPPED | OUTPATIENT
Start: 2023-06-24 | End: 2024-01-31

## 2023-06-24 RX ORDER — EZETIMIBE 10 MG/1
TABLET ORAL
Qty: 90 TABLET | Refills: 1 | Status: SHIPPED | OUTPATIENT
Start: 2023-06-24 | End: 2024-01-31

## 2023-06-24 RX ORDER — PERINDOPRIL ERBUMINE 4 MG/1
TABLET ORAL
Qty: 90 TABLET | Refills: 2 | Status: SHIPPED | OUTPATIENT
Start: 2023-06-24

## 2023-06-24 NOTE — TELEPHONE ENCOUNTER
Refill Decision Note   Srinivasan Bernal  is requesting a refill authorization.  Brief Assessment and Rationale for Refill:  Approve     Medication Therapy Plan:         Comments:     No Care Gaps recommended.     Note composed:6:07 AM 06/24/2023

## 2023-06-24 NOTE — TELEPHONE ENCOUNTER
No care due was identified.  [facility] Embedded Care Due Messages. Reference number: [id number].   6/24/2023 1:59:43 AM CDT

## 2023-06-28 ENCOUNTER — LAB VISIT (OUTPATIENT)
Dept: LAB | Facility: HOSPITAL | Age: 78
End: 2023-06-28
Attending: INTERNAL MEDICINE
Payer: MEDICARE

## 2023-06-28 DIAGNOSIS — I10 ESSENTIAL HYPERTENSION: ICD-10-CM

## 2023-06-28 DIAGNOSIS — D72.829 LEUKOCYTOSIS, UNSPECIFIED TYPE: ICD-10-CM

## 2023-06-28 DIAGNOSIS — E78.5 DYSLIPIDEMIA: ICD-10-CM

## 2023-06-28 LAB
ALBUMIN SERPL BCP-MCNC: 3.8 G/DL (ref 3.5–5.2)
ALP SERPL-CCNC: 101 U/L (ref 55–135)
ALT SERPL W/O P-5'-P-CCNC: 21 U/L (ref 10–44)
ANION GAP SERPL CALC-SCNC: 11 MMOL/L (ref 8–16)
AST SERPL-CCNC: 21 U/L (ref 10–40)
BASOPHILS # BLD AUTO: 0.05 K/UL (ref 0–0.2)
BASOPHILS NFR BLD: 1 % (ref 0–1.9)
BILIRUB SERPL-MCNC: 1 MG/DL (ref 0.1–1)
BUN SERPL-MCNC: 17 MG/DL (ref 8–23)
CALCIUM SERPL-MCNC: 9.5 MG/DL (ref 8.7–10.5)
CHLORIDE SERPL-SCNC: 109 MMOL/L (ref 95–110)
CHOLEST SERPL-MCNC: 143 MG/DL (ref 120–199)
CHOLEST/HDLC SERPL: 2.6 {RATIO} (ref 2–5)
CO2 SERPL-SCNC: 23 MMOL/L (ref 23–29)
CREAT SERPL-MCNC: 0.9 MG/DL (ref 0.5–1.4)
DIFFERENTIAL METHOD: NORMAL
EOSINOPHIL # BLD AUTO: 0.1 K/UL (ref 0–0.5)
EOSINOPHIL NFR BLD: 1.9 % (ref 0–8)
ERYTHROCYTE [DISTWIDTH] IN BLOOD BY AUTOMATED COUNT: 12.9 % (ref 11.5–14.5)
EST. GFR  (NO RACE VARIABLE): >60 ML/MIN/1.73 M^2
GLUCOSE SERPL-MCNC: 89 MG/DL (ref 70–110)
HCT VFR BLD AUTO: 43.5 % (ref 40–54)
HDLC SERPL-MCNC: 55 MG/DL (ref 40–75)
HDLC SERPL: 38.5 % (ref 20–50)
HGB BLD-MCNC: 14.2 G/DL (ref 14–18)
IMM GRANULOCYTES # BLD AUTO: 0.01 K/UL (ref 0–0.04)
IMM GRANULOCYTES NFR BLD AUTO: 0.2 % (ref 0–0.5)
LDLC SERPL CALC-MCNC: 77.2 MG/DL (ref 63–159)
LYMPHOCYTES # BLD AUTO: 1 K/UL (ref 1–4.8)
LYMPHOCYTES NFR BLD: 19.8 % (ref 18–48)
MCH RBC QN AUTO: 30.5 PG (ref 27–31)
MCHC RBC AUTO-ENTMCNC: 32.6 G/DL (ref 32–36)
MCV RBC AUTO: 93 FL (ref 82–98)
MONOCYTES # BLD AUTO: 0.3 K/UL (ref 0.3–1)
MONOCYTES NFR BLD: 6.5 % (ref 4–15)
NEUTROPHILS # BLD AUTO: 3.7 K/UL (ref 1.8–7.7)
NEUTROPHILS NFR BLD: 70.6 % (ref 38–73)
NONHDLC SERPL-MCNC: 88 MG/DL
NRBC BLD-RTO: 0 /100 WBC
PLATELET # BLD AUTO: 223 K/UL (ref 150–450)
PMV BLD AUTO: 9.3 FL (ref 9.2–12.9)
POTASSIUM SERPL-SCNC: 4.2 MMOL/L (ref 3.5–5.1)
PROT SERPL-MCNC: 6.5 G/DL (ref 6–8.4)
RBC # BLD AUTO: 4.66 M/UL (ref 4.6–6.2)
SODIUM SERPL-SCNC: 143 MMOL/L (ref 136–145)
TRIGL SERPL-MCNC: 54 MG/DL (ref 30–150)
WBC # BLD AUTO: 5.26 K/UL (ref 3.9–12.7)

## 2023-06-28 PROCEDURE — 80061 LIPID PANEL: CPT | Mod: HCNC | Performed by: INTERNAL MEDICINE

## 2023-06-28 PROCEDURE — 80053 COMPREHEN METABOLIC PANEL: CPT | Mod: HCNC | Performed by: INTERNAL MEDICINE

## 2023-06-28 PROCEDURE — 36415 COLL VENOUS BLD VENIPUNCTURE: CPT | Mod: HCNC,PO | Performed by: INTERNAL MEDICINE

## 2023-06-28 PROCEDURE — 85025 COMPLETE CBC W/AUTO DIFF WBC: CPT | Mod: HCNC | Performed by: INTERNAL MEDICINE

## 2023-07-10 ENCOUNTER — OFFICE VISIT (OUTPATIENT)
Dept: PAIN MEDICINE | Facility: CLINIC | Age: 78
End: 2023-07-10
Payer: MEDICARE

## 2023-07-10 VITALS
WEIGHT: 166.44 LBS | SYSTOLIC BLOOD PRESSURE: 132 MMHG | DIASTOLIC BLOOD PRESSURE: 82 MMHG | BODY MASS INDEX: 22.54 KG/M2 | HEART RATE: 71 BPM | HEIGHT: 72 IN

## 2023-07-10 DIAGNOSIS — R29.898 RIGHT LEG WEAKNESS: Primary | ICD-10-CM

## 2023-07-10 DIAGNOSIS — M43.10 SPONDYLOLISTHESIS, UNSPECIFIED SPINAL REGION: ICD-10-CM

## 2023-07-10 DIAGNOSIS — M54.16 LUMBAR RADICULOPATHY, CHRONIC: ICD-10-CM

## 2023-07-10 DIAGNOSIS — M48.061 SPINAL STENOSIS OF LUMBAR REGION, UNSPECIFIED WHETHER NEUROGENIC CLAUDICATION PRESENT: ICD-10-CM

## 2023-07-10 PROCEDURE — 99999 PR PBB SHADOW E&M-EST. PATIENT-LVL IV: CPT | Mod: PBBFAC,HCNC,,

## 2023-07-10 PROCEDURE — 3075F SYST BP GE 130 - 139MM HG: CPT | Mod: HCNC,CPTII,S$GLB,

## 2023-07-10 PROCEDURE — 1126F PR PAIN SEVERITY QUANTIFIED, NO PAIN PRESENT: ICD-10-PCS | Mod: HCNC,CPTII,S$GLB,

## 2023-07-10 PROCEDURE — 3079F DIAST BP 80-89 MM HG: CPT | Mod: HCNC,CPTII,S$GLB,

## 2023-07-10 PROCEDURE — 99999 PR PBB SHADOW E&M-EST. PATIENT-LVL IV: ICD-10-PCS | Mod: PBBFAC,HCNC,,

## 2023-07-10 PROCEDURE — 3288F PR FALLS RISK ASSESSMENT DOCUMENTED: ICD-10-PCS | Mod: HCNC,CPTII,S$GLB,

## 2023-07-10 PROCEDURE — 1159F MED LIST DOCD IN RCRD: CPT | Mod: HCNC,CPTII,S$GLB,

## 2023-07-10 PROCEDURE — 3075F PR MOST RECENT SYSTOLIC BLOOD PRESS GE 130-139MM HG: ICD-10-PCS | Mod: HCNC,CPTII,S$GLB,

## 2023-07-10 PROCEDURE — 1101F PR PT FALLS ASSESS DOC 0-1 FALLS W/OUT INJ PAST YR: ICD-10-PCS | Mod: HCNC,CPTII,S$GLB,

## 2023-07-10 PROCEDURE — 99213 OFFICE O/P EST LOW 20 MIN: CPT | Mod: HCNC,S$GLB,,

## 2023-07-10 PROCEDURE — 3288F FALL RISK ASSESSMENT DOCD: CPT | Mod: HCNC,CPTII,S$GLB,

## 2023-07-10 PROCEDURE — 1126F AMNT PAIN NOTED NONE PRSNT: CPT | Mod: HCNC,CPTII,S$GLB,

## 2023-07-10 PROCEDURE — 3079F PR MOST RECENT DIASTOLIC BLOOD PRESSURE 80-89 MM HG: ICD-10-PCS | Mod: HCNC,CPTII,S$GLB,

## 2023-07-10 PROCEDURE — 1159F PR MEDICATION LIST DOCUMENTED IN MEDICAL RECORD: ICD-10-PCS | Mod: HCNC,CPTII,S$GLB,

## 2023-07-10 PROCEDURE — 1101F PT FALLS ASSESS-DOCD LE1/YR: CPT | Mod: HCNC,CPTII,S$GLB,

## 2023-07-10 PROCEDURE — 99213 PR OFFICE/OUTPT VISIT, EST, LEVL III, 20-29 MIN: ICD-10-PCS | Mod: HCNC,S$GLB,,

## 2023-07-10 NOTE — PROGRESS NOTES
[facility]      PCP:   Ray Anna MD    CC:   Chief Complaint   Patient presents with    Leg Problem      No flowsheet data found.    Interval HPI 7/11/2023: Srinivasan Bernal III returns to the office for follow up.  He is new patient to me.  He was previously being evaluated by Kaelyn Williamson PA-C, and was previously having worsening back pain.  He started formal physical therapy and had near complete relief of his lower back pain.  Today his main complaint is weakness with right leg when trying to step up with right foot and some lateral movements when playing pickleball.  He denies any significant weakness when walking and only has weakness when trying to step up with right leg.  He reports associated tingling throughout right leg.  He denies any pain down his left leg nor any numbness or any changes to his bowel or bladder function.      HPI:     Mr. Mai returns for follow up of back pain after PT.  Last seen right lower back and buttock pain that increased with walking and improved with sitting.  He compelded medrol taper and has been working with PT.  While PT has helped improve, but not resolve right lower back pain, he has developed weakness in the right quadriceps.  PT contacted me 5/29/23 about acute onset weakness in the right quadriceps.  Mr. Mai has noticed diffculty stepping up onto a curb because of the weakness.    He has some history of right knee welling since a replacement; he had an xray of the knee and is scheduled to see orthopedics soon for further assessment.     Initial HPI:  Srinivasan Bernal III is a 78 y.o. male with history of CAD presents with lower back pain.  He has had similar pain in 2007 and 2011 with benefit from PT in the past.  Current pain started 3/22/23 without triggering event.  He has pain in the right lower lumbar region/ buttock.  No radicular leg pain, numbness or tignling.  Pain is worse with walking and improves with sitting.  No pain with  laying down.  Has tried ibuprofen.    He had MRI and xray in the past showing right L5 pars deffect and anterolisthesis of L5 on S1.      Past and current medications:  Antineuropathics:  NSAIDs:  ibuprofen  Antidepressants:  Muscle relaxers:  Opioids:  Antiplatelets/Anticoagulants:  ASA  Others: completed medrol taper    Physical therapy/ Chiropractic care:  PT in the past and currently going with benefit    Pain Intervention History:  none    Past Spine Surgical History:  none      History:    Current Outpatient Medications:     aspirin (ECOTRIN) 81 MG EC tablet, Take 81 mg by mouth once daily., Disp: , Rfl:     atorvastatin (LIPITOR) 80 MG tablet, TAKE 1 TABLET ONE TIME DAILY, Disp: 90 tablet, Rfl: 1    azithromycin (ZITHROMAX Z-PROSPER) 250 MG tablet, Take 2 po day 1, then 1 po day 2 - 5 (Patient not taking: Reported on 6/9/2023), Disp: 6 tablet, Rfl: 0    benzonatate (TESSALON) 100 MG capsule, 1 - 2 po every 6 hours prn cough (Patient not taking: Reported on 6/9/2023), Disp: 45 capsule, Rfl: 0    ezetimibe (ZETIA) 10 mg tablet, TAKE 1 TABLET ONE TIME DAILY, Disp: 90 tablet, Rfl: 1    ibuprofen (ADVIL,MOTRIN) 200 MG tablet, Take 200 mg by mouth every 6 (six) hours as needed for Pain., Disp: , Rfl:     methocarbamoL (ROBAXIN) 750 MG Tab, Take 1 tablet (750 mg total) by mouth 4 (four) times daily as needed., Disp: 44 tablet, Rfl: 3    perindopril erbumine (ACEON) 4 mg tablet, TAKE 1 TABLET ONE TIME DAILY, Disp: 90 tablet, Rfl: 2    tadalafiL (CIALIS) 20 MG Tab, Take 1 tablet (20 mg total) by mouth once daily. (Patient not taking: Reported on 7/6/2022), Disp: 30 tablet, Rfl: 11    Past Medical History:   Diagnosis Date    Cataracts, bilateral     2009, 2013    Coronary artery disease 2004    Hypercholesteremia     Kidney stone 2015    Urerteroscopy    Skin cancer 2015    left ankle/ left ear       Past Surgical History:   Procedure Laterality Date    ANKLE FUSION      CIRCUMCISION      COLONOSCOPY N/A 9/20/2022     Procedure: COLONOSCOPY;  Surgeon: Natasha Sommer MD;  Location: [facility];  Service: Endoscopy;  Laterality: N/A;    FOOT SURGERY      HERNIA REPAIR Left 2008    KNEE ARTHROSCOPY Left 2010    KNEE ARTHROSCOPY Right 2010    KNEE SURGERY Left 2010    partial replacement    KNEE SURGERY Right 2012    replacement    MOUTH SURGERY      TOE SURGERY      TONSILLECTOMY         Family History   Problem Relation Age of Onset    Heart disease Father     Dementia Mother        Social History     Socioeconomic History    Marital status:    Tobacco Use    Smoking status: Some Days     Types: Cigars    Smokeless tobacco: Never   Substance and Sexual Activity    Alcohol use: Yes     Comment: rarely    Drug use: No    Sexual activity: Yes     Social Determinants of Health     Financial Resource Strain: Low Risk     Difficulty of Paying Living Expenses: Not hard at all   Food Insecurity: No Food Insecurity    Worried About Running Out of Food in the Last Year: Never true    Ran Out of Food in the Last Year: Never true   Transportation Needs: No Transportation Needs    Lack of Transportation (Medical): No    Lack of Transportation (Non-Medical): No   Physical Activity: Sufficiently Active    Days of Exercise per Week: 7 days    Minutes of Exercise per Session: 60 min   Stress: No Stress Concern Present    Feeling of Stress : Not at all   Social Connections: Unknown    Frequency of Communication with Friends and Family: More than three times a week    Frequency of Social Gatherings with Friends and Family: More than three times a week    Active Member of Clubs or Organizations: Yes    Attends Club or Organization Meetings: More than 4 times per year    Marital Status:    Housing Stability: Low Risk     Unable to Pay for Housing in the Last Year: No    Number of Places Lived in the Last Year: 1    Unstable Housing in the Last Year: No       Review of patient's allergies indicates:  No Known Allergies    Review of  Systems:  Right low back/ buttock pain.  Balance of review of systems is negative.    Physical Exam:  Vitals:    07/10/23 1544   BP: 132/82   Pulse: 71   Weight: 75.5 kg (166 lb 7.2 oz)   Height: 6' (1.829 m)   PainSc: 0-No pain   PainLoc: Leg       Body mass index is 22.57 kg/m².    Gen: NAD  Psych: mood appropriate for given condition  HEENT: eyes anicteric   CV: RRR, 2+ radial pulse  HEENT: anicteric   Respiratory: non-labored, no signs of respiratory distress  Abd: non-distended  Skin: warm, dry and intact.  Gait: Able to heel walk, toe walk. No antalgic gait.     Coordination:   Romberg: negative  Finger to nose coordination: normal  Heel to shin coordination: normal  Tandem walking coordination: normal    Cervical spine:   ROM is full in flexion, extension and lateral rotation without increased pain.  Spurling's maneuver causes no neck pain to either side.  Myofascial exam: No Tenderness to palpation across cervical paraspinous region bilaterally.    Lumbar spine:   ROM is full with flexion extension and oblique extension with no increased pain.    Michoacano's test causes no increased pain on either side.    Supine straight leg raise is negative bilaterally.    Internal and external rotation of the hip causes no increased pain on either side.  Myofascial exam: No tenderness to palpation across lumbar paraspinous muscles. No tenderness to palpation over the bilateral greater trochanters and bilateral SI joint    Sensory:  Intact and symmetrical to light touch in C4-T1 dermatomes bilaterally. Intact and symmetrical to light touch in L1-S1 dermatomes bilaterally.    Motor:       Right Left   L2/3 Iliacus Hip flexion  5  5   L3/4 Qudratus Femoris Knee Extension  5  5   L4/5 Tib Anterior Ankle Dorsiflexion   5  5   L5/S1 Extensor Hallicus Longus Great toe extension  5  5   S1/S2 Gastroc/Soleus Plantar Flexion  5  5      Right Left   Triceps DTR     Biceps DTR     Brachioradialis DTR     Patellar DTR 0+ 0+   Achilles  DTR 0+ 0+   Ramirez Absent  Absent   Clonus Absent Absent   Babinski Absent Absent     Imaging:    Xray lumbar spine report 11-:  multilevel lumbar sponylosis with subtle degenerative retrolisthesis of L1 on L2 and atnerolisthesis of L4 on L5.  Chronic compression deformity invlolving L1 superior endplate with very minimal loss of stature.    MRI lumbar spine report 5-:  multilevel spondylosis in the lumbar spine.  Severe facet degenerative arthropathy bilaterally at L4/5.  Severe DDD at L5/S1 and Grade 1 anterolisthesis of L5 on S1.  Right pars defect of L5 and possible left sided pars defect. No disc herniation at any level.    Xray knees 6-7-23:  The bones are osteopenic.  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  There is nonspecific lucency present beneath the distal right femoral component, and component loosening/infection cannot be excluded.  The left knee medial tibiofemoral hardware is anatomically aligned without complicating feature appreciated radiographically.  There is a moderate left knee joint effusion and/or synovial hypertrophy.  There is subcortical cyst formation observed no fracture, dislocation, or osseous destructive process appreciated radiographically.  There is extensive capsular calcification noted along the posterior margin of the right knee joint.  There is a 9.4 cm calcified density present overlying the left suprapatellar recess.  These findings could relate to CPPD/pyrophosphate arthropathy.  No acute fracture.    MRI lumbar spine 6-8-23:  multilevel degenerative chagnes.  L1 retrolisthesis on L2.  L2/3 retrolisthesis, facet hypertrophy, ligamentous hypertrophy with severe central canal narrowing and occlusion of the thecal sac.  L3/4 broad based disk with right greater than left facet hypertrophy with severe right foraminal narrowing and moderate central canal narrowing.  L4  anterolisthesis on L5 with broad based disk and moderate central canal narrowing.  L5/S1 degenerative disk dessication.     Labs:  No results found for: LABA1C, HGBA1C    Lab Results   Component Value Date    WBC 5.26 06/28/2023    HGB 14.2 06/28/2023    HCT 43.5 06/28/2023    MCV 93 06/28/2023     06/28/2023           Problem List Items Addressed This Visit    None  Visit Diagnoses       Right leg weakness    -  Primary    Relevant Orders    EMG W/ ULTRASOUND AND NERVE CONDUCTION TEST 2 Extremities    Spinal stenosis of lumbar region, unspecified whether neurogenic claudication present        Spondylolisthesis, unspecified spinal region        Lumbar radiculopathy, chronic                Assessment:     Mr. Mai has known spondylolisthesis in the lumbar spine.  He has acute onset right buttock pain - myofascial vs referred from osbaldovie edouard.  Acute onset weakness in the right hip flexors due to L2/3 severe central canal stenosis.  Still significant, but less stenosis at L3/4, L4/5.  Given weakness will refer to neurosurgery.  Will get dynamic xrays prior to neurosurgery visits.  Continue with orthopedic follow up as well.    7/11/2023: Srinivasan Bernal III returns to the office for follow up.  He is new patient to me.  He was previously being evaluated by Kaelyn Williamson PA-C, and was previously having worsening back pain.  He started formal physical therapy and had near complete relief of his lower back pain.  Today his main complaint is weakness with right leg when trying to step up with right foot and some lateral movements when playing pickleball.  He denies any significant weakness when walking and only has weakness when trying to step up with right leg.  He reports associated tingling throughout right leg.  He denies any pain down his left leg nor any numbness or any changes to his bowel or bladder function.    On exam he has full strength.  He does have weakness with stepping onto exam table  with right foot.  Otherwise no weakness.  At this time without significant pain nor any significant numbness I think it is best to maximize conservative therapy.  He has been evaluated by Neurosurgery and they do not recommend any surgical interventions.  They have placed orders for him to attend formal physical therapy.  This weakness is likely originating from his lumbar spine as he does have narrowing that could be contributing to this.  However at this time without significant pain I think it is best to see how he progresses with physical therapy.  He does have a history of right knee replacement however I do not believe this is originating from that.  Nor do I believe he is having any muscular issue contributing to this weakness.  I have ordered an EMG for further evaluation.  Follow up as needed.  If pain returns or weakness worsens can consider MARITZA.    : Not applicable    This note was completed with dictation software and grammatical errors may exist.

## 2023-07-11 ENCOUNTER — TELEPHONE (OUTPATIENT)
Dept: FAMILY MEDICINE | Facility: CLINIC | Age: 78
End: 2023-07-11
Payer: MEDICARE

## 2023-07-12 ENCOUNTER — TELEPHONE (OUTPATIENT)
Dept: FAMILY MEDICINE | Facility: CLINIC | Age: 78
End: 2023-07-12
Payer: MEDICARE

## 2023-07-14 DIAGNOSIS — M25.561 RIGHT KNEE PAIN, UNSPECIFIED CHRONICITY: Primary | ICD-10-CM

## 2023-07-19 ENCOUNTER — CLINICAL SUPPORT (OUTPATIENT)
Dept: REHABILITATION | Facility: HOSPITAL | Age: 78
End: 2023-07-19
Attending: STUDENT IN AN ORGANIZED HEALTH CARE EDUCATION/TRAINING PROGRAM
Payer: MEDICARE

## 2023-07-19 DIAGNOSIS — M48.061 SPINAL STENOSIS OF LUMBAR REGION, UNSPECIFIED WHETHER NEUROGENIC CLAUDICATION PRESENT: ICD-10-CM

## 2023-07-19 PROCEDURE — 97110 THERAPEUTIC EXERCISES: CPT | Mod: HCNC,PO | Performed by: PHYSICAL THERAPIST

## 2023-07-19 PROCEDURE — 97161 PT EVAL LOW COMPLEX 20 MIN: CPT | Mod: HCNC,PO | Performed by: PHYSICAL THERAPIST

## 2023-07-19 NOTE — PLAN OF CARE
[facility]  Physical Therapy Plan of Care Note     Name: Srinivasan Bernal III  Clinic Number: [id number]    Therapy Diagnosis:   Encounter Diagnosis   Name Primary?    Spinal stenosis of lumbar region, unspecified whether neurogenic claudication present      Physician: Gio Goldberg MD    Physician Orders: PT Eval and Treat   Post Surgical? No   Eval and Treat Yes   Type of Therapy Outpatient Therapy   Location: Other (see comments)   Referred to Region: Only select region(s) you would like the patient to be seen in if it is outside of the current encounter's department. [facility] ([place]) Comment - Balance, gait, quad     Medical Diagnosis from Referral: M48.061 (ICD-10-CM) - Spinal stenosis of lumbar region, unspecified whether neurogenic claudication present   Evaluation Date: 7/19/2023  Authorization Period Expiration: 7/22/2024  Plan of Care Expiration:  09/13/2023  Progress Note Due: 08/17/2023  Visit # / Visits authorized: 1/ 1   FOTO: 1/ 3    Precautions: cancer and CAD FALL RISK, Sudden movements    Time In: 1404  Time Out: 1459  Total Billable Time: 55 minutes    Subjective     Date of onset: Left leg and knee started when he fell up the stairs April/may 2023    History of current condition - Sergei reports: He started formal physical therapy and had near complete relief of his lower back pain back in April/may. Today his main complaint is weakness in his right leg when trying to step up with his right foot. He also has weakness with lateral movements when playing pickleball. He denies any significant weakness when walking and only has weakness when trying to step up with right leg. He reports associated tingling throughout right leg. He has some history of right knee swelling since a TKA.   He reported his original knee ONEAL was in April or May of 2023. He reports he was going up a [place] staircase on the side with no handrail and he fell on the stairs. This was  the first time he became aware of the knee weakness.      Falls: multiple falls when turning etc. Anytime he gets out of alignment he falls.     Imaging: radiography: Narrative & Impression  EXAMINATION:  XR LUMBAR SPINE 5 VIEW WITH FLEX AND EXT     CLINICAL HISTORY:  Spondylolisthesis, site unspecified     TECHNIQUE:  Five views of the lumbar spine plus flexion extension views were performed.     COMPARISON:  MRI of the lumbar spine 06/08/2023.     FINDINGS:  Redemonstrated broad levocurvature.  Grade 1 retrolisthesis of L1 on L2 and anterolisthesis of L4 on L5 and L5 on S1 in the setting of advanced multilevel facet arthrosis.  No definite abnormal motion between dynamic flexion and extension maneuvers.  No fractures or bony destructive changes.  Severe disc height loss at L1-L2, L4-L5, and L5-S1. mild broad levocurvature.     Impression:     1. Advanced multilevel spondylosis.        Electronically signed by: Jasper Posadas  Date:                                            06/09/2023  Time:                                           12:46    Prior Therapy: none  Social History: lives alone  Occupation: retired   Prior Level of Function: able to play [employer] ball etc.   Current Level of Function: Has stopped playing pickle ball.     Pain:  Current 0/10, worst 0/10, best 0/10   Location: right leg   Description: tingling   Aggravating Factors: first thing in the morning and after sitting.   Easing Factors: none     Patients goals: Wants to be able to climb normal stairs without holding on to the railing.      Medical History:   Past Medical History:   Diagnosis Date    Cataracts, bilateral     2009, 2013    Coronary artery disease 2004    Hypercholesteremia     Kidney stone 2015    Urerteroscopy    Skin cancer 2015    left ankle/ left ear     Surgical History:   Srinivasan Bernal III  has a past surgical history that includes Tonsillectomy; Toe Surgery; Foot surgery; Ankle Fusion; Hernia repair (Left,  ); Knee arthroscopy (Left, ); Knee surgery (Left, ); Knee arthroscopy (Right, ); Knee surgery (Right, ); Mouth surgery; Circumcision; and Colonoscopy (N/A, 2022).    Medications:   Srinivasan has a current medication list which includes the following prescription(s): aspirin, atorvastatin, ezetimibe, ibuprofen, methocarbamol, perindopril erbumine, and tadalafil.    Allergies:   Review of patient's allergies indicates:  No Known Allergies     Objective      Gait: pt. Has moderately impaired/antalgic gait with moderate retroversion in the right LE during gait.     Lower Extremity Strength  Right LE  Left LE    Knee extension: 4-/5 Knee extension: 4-/5   Knee flexion: 4-/5 Knee flexion: 4-/5   Hip flexion: 3-/5 Hip flexion: 3-/5   Hip extension:  3-/5 Hip extension: 3-/5   Hip abduction: 4/5 Hip abduction: 4/5   Hip adduction: 4-/5 Hip adduction 4-/5   Ankle dorsiflexion: 4-/5 Ankle dorsiflexion: 5/5   Ankle plantarflexion: 4-/5 Ankle plantarflexion: 5/5     Range of Motion:   Knee Left active Left Passive Right Active R passive   Flexion 130 135 + 115  117   Extension 0 NT -7 -4       Function:  Balance testing     Left le seconds with multiple HHAs (<3 s without HHA)  Right le seconds with multiple HHAs (<3 s without HHA)  Right Tandem stance: 30 seconds on floor with mild sway   Left Tandem stance: 30 seconds on floor with moderate sway     Step down test: NT    Sensation: impaired around Right knee, ant thigh      Intake Outcome Measure for FOTO KNEE Survey    Therapist reviewed FOTO scores for Srinivasan Bernal III on 2023.   FOTO documents entered into EPIC - see Media section.    Intake Score: NT test next time%         Treatment     Total Treatment time (time-based codes) separate from Evaluation: 10 minutes     Sergei received the treatments listed below:      therapeutic exercises to develop strength, endurance, ROM, flexibility, posture, and core stabilization for 10  minutes including:  Heel slides x 5 bilateral   Long arc quad 5 x bilateral   SHC x 5 bilateral   Quad sets 5 x bilateral       Patient Education and Home Exercises     Education provided:   - on HEP.   - Pt/family was provided educational information, including: role of PT, role of pt/caregiver, goals for PT, POC, scheduling, and attendance policy.- pt verbalized understanding.     Written Home Exercises Provided: yes. Exercises were reviewed and Sergei was able to demonstrate them prior to the end of the session.  Sergei demonstrated good  understanding of the education provided. See EMR under Patient Instructions for exercises provided during therapy sessions.    Assessment     Srinivasan is a 78 y.o. male referred to outpatient Physical Therapy with a medical diagnosis of Spinal stenosis of lumbar region, unspecified whether neurogenic claudication present by Dr. Whiteside. Pt. sees Dr. Goldberg tomorrow for his right knee. PT communicated with Dr. Goldberg's office regarding a new referral for his right knee/leg and the office verbalized agreement to do so as pt does not want to be treated for his back and states the back is doing well. Patient presents with limited bilateral knee AROM most notably in the right knee. Pt. Presents with bilateral lower extremity weakness and balance issues. Patient has moderate balance issues doing anything besides walking straight. He also reported he has extreme trouble going up stairs. He can benefit from overall LE strengthening and balance exercises.     Patient prognosis is Good.   Patient will benefit from skilled outpatient Physical Therapy to address the deficits stated above and in the chart below, provide patient /family education, and to maximize patientt's level of independence.     Plan of care discussed with patient: Yes  Patient's spiritual, cultural and educational needs considered and patient is agreeable to the plan of care and goals as stated below:     Anticipated  Barriers for therapy: multiple co-morbidities.     Medical Necessity is demonstrated by the following  History  Co-morbidities and personal factors that may impact the plan of care [] LOW: no personal factors / co-morbidities  [] MODERATE: 1-2 personal factors / co-morbidities  [x] HIGH: 3+ personal factors / co-morbidities    Moderate / High Support Documentation:   Co-morbidities affecting plan of care:   Past Medical History:   Diagnosis Date    Cataracts, bilateral     2009, 2013    Coronary artery disease 2004    Hypercholesteremia     Kidney stone 2015    Urerteroscopy    Skin cancer 2015    left ankle/ left ear       Personal Factors:   Attitude      Examination  Body Structures and Functions, activity limitations and participation restrictions that may impact the plan of care [x] LOW: addressing 1-2 elements  [] MODERATE: 3+ elements  [] HIGH: 4+ elements (please support below)    Moderate / High Support Documentation: bilateral LE weakness and moderate balance issues.      Clinical Presentation [x] LOW: stable  [] MODERATE: Evolving  [] HIGH: Unstable     Decision Making/ Complexity Score: low       Goals:  Short Term Goals: 4 weeks   Pt. Will report he is able to walk up stairs more easily with a handrail for increased ability to go out in the community.   Pt. Will improve bilateral lower extremity to 4-/5 for improved ambulatory endurance.   Pt. Will be able to do single leg stance bilaterally for 15 seconds with no HHA, to decrease likelihood of falls.     Long Term Goals: 8 weeks   Pt. Will report he is able to walk up stairs more easily with a handrail for increased ability to go out in the community.   Pt. Will improve bilateral lower extremity to 4+/5  for improved ambulatory endurance.   Pt. Will be able to do single leg stance bilaterally for 30 seconds with no HHA, to decrease likelihood of falls.     Plan     Plan of care Certification: 7/19/2023 to 09/13/2023.    Outpatient Physical Therapy 1-2  times weekly for 8 weeks to include the following interventions: Cervical/Lumbar Traction, Gait Training, Manual Therapy, Neuromuscular Re-ed, Patient Education, Self Care, Therapeutic Activities, and Therapeutic Exercise.     Rocio De La Cruz, PT      I CERTIFY THE NEED FOR THESE SERVICES FURNISHED UNDER THIS PLAN OF TREATMENT AND WHILE UNDER MY CARE    Physician's comments:      Physician's Signature: ___________________________________________________ DATE:_______________________

## 2023-07-20 ENCOUNTER — HOSPITAL ENCOUNTER (OUTPATIENT)
Dept: RADIOLOGY | Facility: HOSPITAL | Age: 78
Discharge: HOME | End: 2023-07-20
Attending: ORTHOPAEDIC SURGERY
Payer: MEDICARE

## 2023-07-20 ENCOUNTER — OFFICE VISIT (OUTPATIENT)
Dept: ORTHOPEDICS | Facility: CLINIC | Age: 78
End: 2023-07-20
Payer: MEDICARE

## 2023-07-20 VITALS — BODY MASS INDEX: 22.54 KG/M2 | HEIGHT: 72 IN | WEIGHT: 166.44 LBS

## 2023-07-20 DIAGNOSIS — M17.11 PRIMARY OSTEOARTHRITIS OF RIGHT KNEE: Primary | ICD-10-CM

## 2023-07-20 DIAGNOSIS — M25.561 RIGHT KNEE PAIN, UNSPECIFIED CHRONICITY: ICD-10-CM

## 2023-07-20 PROCEDURE — 3288F PR FALLS RISK ASSESSMENT DOCUMENTED: ICD-10-PCS | Mod: HCNC,CPTII,S$GLB, | Performed by: ORTHOPAEDIC SURGERY

## 2023-07-20 PROCEDURE — 73562 XR KNEE ORTHO RIGHT: ICD-10-PCS | Mod: 26,HCNC,RT, | Performed by: RADIOLOGY

## 2023-07-20 PROCEDURE — 1126F PR PAIN SEVERITY QUANTIFIED, NO PAIN PRESENT: ICD-10-PCS | Mod: HCNC,CPTII,S$GLB, | Performed by: ORTHOPAEDIC SURGERY

## 2023-07-20 PROCEDURE — 73560 X-RAY EXAM OF KNEE 1 OR 2: CPT | Mod: TC,HCNC,PO,LT

## 2023-07-20 PROCEDURE — 99214 OFFICE O/P EST MOD 30 MIN: CPT | Mod: HCNC,S$GLB,, | Performed by: ORTHOPAEDIC SURGERY

## 2023-07-20 PROCEDURE — 99999 PR PBB SHADOW E&M-EST. PATIENT-LVL III: ICD-10-PCS | Mod: PBBFAC,HCNC,, | Performed by: ORTHOPAEDIC SURGERY

## 2023-07-20 PROCEDURE — 1126F AMNT PAIN NOTED NONE PRSNT: CPT | Mod: HCNC,CPTII,S$GLB, | Performed by: ORTHOPAEDIC SURGERY

## 2023-07-20 PROCEDURE — 1101F PT FALLS ASSESS-DOCD LE1/YR: CPT | Mod: HCNC,CPTII,S$GLB, | Performed by: ORTHOPAEDIC SURGERY

## 2023-07-20 PROCEDURE — 1159F PR MEDICATION LIST DOCUMENTED IN MEDICAL RECORD: ICD-10-PCS | Mod: HCNC,CPTII,S$GLB, | Performed by: ORTHOPAEDIC SURGERY

## 2023-07-20 PROCEDURE — 99214 PR OFFICE/OUTPT VISIT, EST, LEVL IV, 30-39 MIN: ICD-10-PCS | Mod: HCNC,S$GLB,, | Performed by: ORTHOPAEDIC SURGERY

## 2023-07-20 PROCEDURE — 73562 X-RAY EXAM OF KNEE 3: CPT | Mod: 26,HCNC,RT, | Performed by: RADIOLOGY

## 2023-07-20 PROCEDURE — 3288F FALL RISK ASSESSMENT DOCD: CPT | Mod: HCNC,CPTII,S$GLB, | Performed by: ORTHOPAEDIC SURGERY

## 2023-07-20 PROCEDURE — 1101F PR PT FALLS ASSESS DOC 0-1 FALLS W/OUT INJ PAST YR: ICD-10-PCS | Mod: HCNC,CPTII,S$GLB, | Performed by: ORTHOPAEDIC SURGERY

## 2023-07-20 PROCEDURE — 1160F PR REVIEW ALL MEDS BY PRESCRIBER/CLIN PHARMACIST DOCUMENTED: ICD-10-PCS | Mod: HCNC,CPTII,S$GLB, | Performed by: ORTHOPAEDIC SURGERY

## 2023-07-20 PROCEDURE — 99999 PR PBB SHADOW E&M-EST. PATIENT-LVL III: CPT | Mod: PBBFAC,HCNC,, | Performed by: ORTHOPAEDIC SURGERY

## 2023-07-20 PROCEDURE — 73560 X-RAY EXAM OF KNEE 1 OR 2: CPT | Mod: 26,HCNC,LT, | Performed by: RADIOLOGY

## 2023-07-20 PROCEDURE — 1160F RVW MEDS BY RX/DR IN RCRD: CPT | Mod: HCNC,CPTII,S$GLB, | Performed by: ORTHOPAEDIC SURGERY

## 2023-07-20 PROCEDURE — 73560 XR KNEE ORTHO RIGHT: ICD-10-PCS | Mod: 26,HCNC,LT, | Performed by: RADIOLOGY

## 2023-07-20 PROCEDURE — 1159F MED LIST DOCD IN RCRD: CPT | Mod: HCNC,CPTII,S$GLB, | Performed by: ORTHOPAEDIC SURGERY

## 2023-07-20 NOTE — PROGRESS NOTES
Chief Complaint   Patient presents with    Right Knee - Pain       HPI:    This is a 78 y.o. who presents today complaining of right knee weakness and pain for 2 months after no interval trauma. He is s/p TKA at OSH. Pain is dull. No numbness or tingling. No associated signs or symptoms.      Past Medical History:   Diagnosis Date    Cataracts, bilateral     2009, 2013    Coronary artery disease 2004    Hypercholesteremia     Kidney stone 2015    Urerteroscopy    Skin cancer 2015    left ankle/ left ear      Past Surgical History:   Procedure Laterality Date    ANKLE FUSION      CIRCUMCISION      COLONOSCOPY N/A 9/20/2022    Procedure: COLONOSCOPY;  Surgeon: Natasha Sommer MD;  Location: [facility];  Service: Endoscopy;  Laterality: N/A;    FOOT SURGERY      HERNIA REPAIR Left 2008    KNEE ARTHROSCOPY Left 2010    KNEE ARTHROSCOPY Right 2010    KNEE SURGERY Left 2010    partial replacement    KNEE SURGERY Right 2012    replacement    MOUTH SURGERY      TOE SURGERY      TONSILLECTOMY        Current Outpatient Medications on File Prior to Visit   Medication Sig Dispense Refill    aspirin (ECOTRIN) 81 MG EC tablet Take 81 mg by mouth once daily.      atorvastatin (LIPITOR) 80 MG tablet TAKE 1 TABLET ONE TIME DAILY 90 tablet 1    ezetimibe (ZETIA) 10 mg tablet TAKE 1 TABLET ONE TIME DAILY 90 tablet 1    ibuprofen (ADVIL,MOTRIN) 200 MG tablet Take 200 mg by mouth every 6 (six) hours as needed for Pain.      methocarbamoL (ROBAXIN) 750 MG Tab Take 1 tablet (750 mg total) by mouth 4 (four) times daily as needed. 44 tablet 3    perindopril erbumine (ACEON) 4 mg tablet TAKE 1 TABLET ONE TIME DAILY 90 tablet 2    tadalafiL (CIALIS) 20 MG Tab Take 1 tablet (20 mg total) by mouth once daily. (Patient not taking: Reported on 7/6/2022) 30 tablet 11     No current facility-administered medications on file prior to visit.      Review of patient's allergies indicates:  No Known Allergies   Family History not pertinent   Social  History     Socioeconomic History    Marital status:    Tobacco Use    Smoking status: Some Days     Types: Cigars    Smokeless tobacco: Never   Substance and Sexual Activity    Alcohol use: Yes     Comment: rarely    Drug use: No    Sexual activity: Yes     Social Determinants of Health     Financial Resource Strain: Low Risk     Difficulty of Paying Living Expenses: Not hard at all   Food Insecurity: No Food Insecurity    Worried About Running Out of Food in the Last Year: Never true    Ran Out of Food in the Last Year: Never true   Transportation Needs: No Transportation Needs    Lack of Transportation (Medical): No    Lack of Transportation (Non-Medical): No   Physical Activity: Sufficiently Active    Days of Exercise per Week: 7 days    Minutes of Exercise per Session: 60 min   Stress: No Stress Concern Present    Feeling of Stress : Not at all   Social Connections: Unknown    Frequency of Communication with Friends and Family: More than three times a week    Frequency of Social Gatherings with Friends and Family: More than three times a week    Active Member of Clubs or Organizations: Yes    Attends Club or Organization Meetings: More than 4 times per year    Marital Status:    Housing Stability: Low Risk     Unable to Pay for Housing in the Last Year: No    Number of Places Lived in the Last Year: 1    Unstable Housing in the Last Year: No         Review of Systems:   Constitutional:  Denies fever or chills    Eyes:  Denies change in visual acuity    HENT:  Denies nasal congestion or sore throat    Respiratory:  Denies cough or shortness of breath    Cardiovascular:  Denies chest pain or edema    GI:  Denies abdominal pain, nausea, vomiting, bloody stools or diarrhea    :  Denies dysuria    Integument:  Denies rash    Neurologic:  Denies headache, focal weakness or sensory changes    Endocrine:  Denies polyuria or polydipsia    Lymphatic:  Denies swollen glands    Psychiatric:  Denies  depression or anxiety       Physical Exam:    Constitutional:  Well developed, well nourished, no acute distress, non-toxic appearance    Integument:  Well hydrated, no rash    Lymphatic:  No lymphadenopathy noted    Neurologic:  Alert & oriented x 3,     Psychiatric:  Speech and behavior appropriate    Gi: abdomen soft  Eyes: EOMI   L knee  Exam performed same as contralateral side and is normal  R knee  Instability noted in flexion. Overall normal alignment. 3/5 tib ant. 4/5 quads. NVI distally.      X-rays were performed today, personally reviewed by me and findings discussed with the patient.   3 views of the right knee show implants intact in good position      Primary osteoarthritis of right knee        Will place in brace and see him back in 1 month     Answers submitted by the patient for this visit:  Orthopedics Questionnaire (Submitted on 7/14/2023)  unexpected weight change: No  appetite change : No  sleep disturbance: No  IMMUNOCOMPROMISED: No  nervous/ anxious: No  dysphoric mood: No  rash: No  visual disturbance: No  eye redness: No  eye pain: No  ear pain: No  tinnitus: No  hearing loss: No  sinus pressure : No  nosebleeds: No  enviro allergies: No  food allergies: No  cough: No  shortness of breath: No  sweating: No  frequency: Yes  difficulty urinating: No  hematuria: No  chest pain: No  palpitations: No  nausea: No  vomiting: No  diarrhea: No  blood in stool: No  constipation: No  headaches: No  dizziness: No  numbness: No  seizures: No  joint swelling: No  myalgia: Yes  weakness: Yes  back pain: Yes   (Submitted on 7/14/2023)  Chief Complaint: Leg pain  Pain Chronicity: recurrent  History of trauma: No  Onset: more than 1 year ago  Frequency: intermittently  Progression since onset: gradually improving  Injury mechanism: lifting  injury location: at home  pain- numeric: 1/10  pain location: right hip  pain quality: tingling  Radiating Pain: Yes  If your pain is radiating, to what part of the  body?: right thigh  Aggravating factors: bearing weight  fever: No  inability to bear weight: Yes  itching: No  joint locking: No  limited range of motion: Yes  stiffness: No  tingling: Yes  Treatments tried: exercise  physical therapy: effective  Improvement on treatment: significant

## 2023-07-21 ENCOUNTER — CLINICAL SUPPORT (OUTPATIENT)
Dept: REHABILITATION | Facility: HOSPITAL | Age: 78
End: 2023-07-21
Attending: STUDENT IN AN ORGANIZED HEALTH CARE EDUCATION/TRAINING PROGRAM
Payer: MEDICARE

## 2023-07-21 PROCEDURE — 97112 NEUROMUSCULAR REEDUCATION: CPT | Mod: HCNC,PO,CQ

## 2023-07-21 PROCEDURE — 97110 THERAPEUTIC EXERCISES: CPT | Mod: HCNC,PO,CQ

## 2023-07-21 NOTE — PROGRESS NOTES
[facility]   Physical Therapy Treatment Note     Name: Srinivasan Bernal III  Clinic Number: [id number]    Therapy Diagnosis: No diagnosis found.  Physician: Harry Whiteside MD    Visit Date: 7/21/2023  Physician Orders: PT Eval and Treat   Post Surgical? No   Eval and Treat Yes   Type of Therapy Outpatient Therapy   Location: Other (see comments)   Referred to Region: Only select region(s) you would like the patient to be seen in if it is outside of the current encounter's department. [facility] ([place]) Comment - Balance, gait, quad      Medical Diagnosis from Referral: M48.061 (ICD-10-CM) - Spinal stenosis of lumbar region, unspecified whether neurogenic claudication present   Evaluation Date: 7/19/2023  Authorization Period Expiration: 7/22/2024  Plan of Care Expiration:  09/13/2023  Progress Note Due: 08/17/2023  Visit # / Visits authorized: 1/ 1   FOTO: 1/ 3     Precautions: cancer and CAD FALL RISK, Sudden movements     Time In: 4:30  Time Out: 5:25  Total Billable Time: 55 minutes  [    SUBJECTIVE     Pt reports: that he is w/o c/o knee pn. He states that his issue is R LE weakness. He notes that he is scheduled for an EMG .  He was compliant with home exercise program.  Response to previous treatment: no adverse effects  Functional change: too soon to assess    Pain: 0/10  Location: right knee      OBJECTIVE     Objective Measures updated at progress report unless specified.     Treatment     Sergei received the treatments listed below:      therapeutic exercises to develop strength, endurance, ROM, flexibility, posture, and core stabilization for 40 minutes including:  Stat bike x 5 min  GSS x 2 min  HSS x 2 min  Leg Press 50# 20x  Calf Press 50# 20x  Knee ex machine B 25# 20  Hip ABD 35# 20x  Hip ADD 45 # 20x    neuromuscular re-education activities to improve: Coordination, Kinesthetic, Sense, and Proprioception for 15 minutes. The following activities were  included:  SLR with QS 20x (some minor lag)  Mini Squat with focus on form to avoid valgus  Knee ex machine 2 up 1 down eccentric 20x 15#  TKE red t-band 20x VCs for quad contraction to achieve TKE        Patient Education and Home Exercises     Home Exercises Provided and Patient Education Provided     Education provided:   - Proper donning of brace    Written Home Exercises Provided: Patient instructed to cont prior HEP. Exercises were reviewed and Sergei was able to demonstrate them prior to the end of the session.  Sergei demonstrated good  understanding of the education provided. See EMR under Patient Instructions for exercises provided during therapy sessions    ASSESSMENT     Sergei raegan today's tx with ther ex and neuromuscular re-ed well. He was w/o c/o pn throughout tx. His quad and hip fatigue easily and he does exhibit quad and hip weakness with exs. He was instructed in proper fit and donning of knee brace.     Sergei Is progressing well towards his goals.   Pt prognosis is Good.     Pt will continue to benefit from skilled outpatient physical therapy to address the deficits listed in the problem list box on initial evaluation, provide pt/family education and to maximize pt's level of independence in the home and community environment.     Pt's spiritual, cultural and educational needs considered and pt agreeable to plan of care and goals.     Anticipated barriers to physical therapy: multiple co-morbidities     Goals: Short Term Goals: 4 weeks   Pt. Will report he is able to walk up stairs more easily with a handrail for increased ability to go out in the community.   Pt. Will improve bilateral lower extremity to 4-/5 for improved ambulatory endurance.   Pt. Will be able to do single leg stance bilaterally for 15 seconds with no HHA, to decrease likelihood of falls.      Long Term Goals: 8 weeks   Pt. Will report he is able to walk up stairs more easily with a handrail for increased ability to go out in  the community.   Pt. Will improve bilateral lower extremity to 4+/5  for improved ambulatory endurance.   Pt. Will be able to do single leg stance bilaterally for 30 seconds with no HHA, to decrease likelihood of falls    PLAN     Plan of care Certification: 7/19/2023 to 09/13/2023.     Outpatient Physical Therapy 1-2 times weekly for 8 weeks to include the following interventions: Cervical/Lumbar Traction, Gait Training, Manual Therapy, Neuromuscular Re-ed, Patient Education, Self Care, Therapeutic Activities, and Therapeutic Exercise    Immanuel Tolbert, PTA

## 2023-07-24 NOTE — PROGRESS NOTES
[facility]   Physical Therapy Treatment Note     Name: Srinivasan Bernal III  Clinic Number: [id number]    Therapy Diagnosis:   Encounter Diagnosis   Name Primary?    Primary osteoarthritis of right knee      Physician: Harry Whiteside MD    Visit Date: 7/25/2023  Physician Orders: PT Eval and Treat   Post Surgical? No   Eval and Treat Yes   Type of Therapy Outpatient Therapy   Location: Other (see comments)   Referred to Region: Only select region(s) you would like the patient to be seen in if it is outside of the current encounter's department. [facility] ([place]) Comment - Balance, gait, quad      Medical Diagnosis from Referral: M48.061 (ICD-10-CM) - Spinal stenosis of lumbar region, unspecified whether neurogenic claudication present   Evaluation Date: 7/19/2023  Authorization Period Expiration: 7/22/2024  Plan of Care Expiration:  09/13/2023  Progress Note Due: 08/17/2023  Visit # / Visits authorized: 1/ 1, (2/20 pending)   FOTO: 1/ 3     Precautions: cancer and CAD FALL RISK, Sudden movements     Time In: 10:00  Time Out: 10:59  Total Billable Time: 55 minutes      SUBJECTIVE     Pt reports: that he has no pain today. He states that his issue is R LE weakness. He notes that he is scheduled for an EMG on September 8th. He feels that he is seeing improvement when it comes to going upstairs.   He was compliant with home exercise program.  Response to previous treatment: no adverse effects  Functional change: too soon to assess    Pain: 0/10  Location: right knee      OBJECTIVE     Objective Measures updated at progress report unless specified.     Treatment     Sergei received the treatments listed below:      therapeutic exercises to develop strength, endurance, ROM, flexibility, posture, and core stabilization for 40 minutes including:  Stat bike x 5 minutes  Long arc quad 2 x 10 with a 2#  Short arc quad 2 x 10 with a 2#   GSS x 2 min  HSS x 2 minimum  Quad sets 2 x 10  "hold for 5"  Leg Press 50# 20x  Calf Press 50# 20x  Knee ex machine B 25# 20  Hip ABD 35# 20x  Hip ADD 45 # 20x  Hip matrix Abduction 20#  Marches with 2# weight     neuromuscular re-education activities to improve: Coordination, Kinesthetic, Sense, and Proprioception for 15 minutes. The following activities were included:  SLR with QS 20x (some minor lag)  Mini Squat with focus on form to avoid valgus (Vcs on proper form)   Knee ex machine 2 up 1 down eccentric 20x 15#  TKE red t-band 20x VCs for quad contraction to achieve TKE    Patient Education and Home Exercises     Home Exercises Provided and Patient Education Provided     Education provided:     Written Home Exercises Provided: Patient instructed to cont prior HEP. Exercises were reviewed and Sergei was able to demonstrate them prior to the end of the session.  Sergei demonstrated good  understanding of the education provided. See EMR under Patient Instructions for exercises provided during therapy sessions    ASSESSMENT     Sergei was able to progress his exercises today. He had moderate trouble with his mini squats prior to verbal and tactile cueing. He was w/o c/o pn throughout tx. His quad and hip fatigue easily and he does exhibit quad and hip weakness with exs. Stairs were implemented today with a gait belt as well Vcs to use handrails and Pt. Did well with them. Pt. Was informed to make sure to use handrails if he encounters any stairs in the community.     Sergei Is progressing well towards his goals.   Pt prognosis is Good.     Pt will continue to benefit from skilled outpatient physical therapy to address the deficits listed in the problem list box on initial evaluation, provide pt/family education and to maximize pt's level of independence in the home and community environment.     Pt's spiritual, cultural and educational needs considered and pt agreeable to plan of care and goals.     Anticipated barriers to physical therapy: multiple " co-morbidities     Goals: Short Term Goals: 4 weeks   Pt. Will report he is able to walk up stairs more easily with a handrail for increased ability to go out in the community.   Pt. Will improve bilateral lower extremity to 4-/5 for improved ambulatory endurance.   Pt. Will be able to do single leg stance bilaterally for 15 seconds with no HHA, to decrease likelihood of falls.      Long Term Goals: 8 weeks   Pt. Will report he is able to walk up stairs more easily with a handrail for increased ability to go out in the community.   Pt. Will improve bilateral lower extremity to 4+/5  for improved ambulatory endurance.   Pt. Will be able to do single leg stance bilaterally for 30 seconds with no HHA, to decrease likelihood of falls    PLAN     Plan of care Certification: 7/19/2023 to 09/13/2023.     Outpatient Physical Therapy 1-2 times weekly for 8 weeks to include the following interventions: Cervical/Lumbar Traction, Gait Training, Manual Therapy, Neuromuscular Re-ed, Patient Education, Self Care, Therapeutic Activities, and Therapeutic Exercise    Rocio De La Cruz, PT

## 2023-07-25 ENCOUNTER — CLINICAL SUPPORT (OUTPATIENT)
Dept: REHABILITATION | Facility: HOSPITAL | Age: 78
End: 2023-07-25
Attending: STUDENT IN AN ORGANIZED HEALTH CARE EDUCATION/TRAINING PROGRAM
Payer: MEDICARE

## 2023-07-25 DIAGNOSIS — M17.11 PRIMARY OSTEOARTHRITIS OF RIGHT KNEE: ICD-10-CM

## 2023-07-25 PROCEDURE — 97110 THERAPEUTIC EXERCISES: CPT | Mod: HCNC,PO | Performed by: PHYSICAL THERAPIST

## 2023-07-25 PROCEDURE — 97112 NEUROMUSCULAR REEDUCATION: CPT | Mod: HCNC,PO | Performed by: PHYSICAL THERAPIST

## 2023-07-26 NOTE — PROGRESS NOTES
"[facility] OUTPAT[facility] THERAP[facility] AND [facility]LL[facility]   Physical Therapy Treatment Note     Name: Srinivasan Bernal III  Clinic Number: [id number]    Therapy Diagnosis:   Encounter Diagnosis   Name Primary?    Weakness of right lower extremity        Physician: Harry Whiteside MD    Visit Date: 7/27/2023  Physician Orders: PT Eval and Treat   Post Surgical? No   Eval and Treat Yes   Type of Therapy Outpatient Therapy   Location: Other (see comments)   Referred to Region: Only select region(s) you would like the patient to be seen in if it is outside of the current encounter's department. [facility] ([place]) Comment - Balance, gait, quad      Medical Diagnosis from Referral: M48.061 (ICD-10-CM) - Spinal stenosis of lumbar region, unspecified whether neurogenic claudication present   Evaluation Date: 7/19/2023  Authorization Period Expiration: 7/22/2024  Plan of Care Expiration:  09/13/2023  Progress Note Due: 08/17/2023  Visit # / Visits authorized: 1/ 1, 4/20   FOTO: 1/ 3     Precautions: cancer and CAD FALL RISK, Sudden movements     Time In: 1405  Time Out: 1500  Total Billable Time: 25 minutes      SUBJECTIVE     Pt reports: his right LE continues to get stronger. He feels the soreness with working out where he should with any given ex.   He was compliant with home exercise program.  Response to previous treatment: no adverse effects  Functional change: too soon to assess    Pain: 0/10  Location: right knee      OBJECTIVE     Objective Measures updated at progress report unless specified.     Treatment     Sergei received the treatments listed below:      SPT assisted with 1/2 tx for non-billble time.    therapeutic exercises to develop strength, endurance, ROM, flexibility, posture, and core stabilization for 40 minutes including:  Stat bike x 5 minutes  Long arc quad 2 x 10 with a 2#  Short arc quad 2 x 10 with a 2#   GSS x 2 min  HSS x 2 minimum  Quad sets 2 x 10 hold for 5"  Leg Press 50# 2 x 20  Calf Press 50# " 20x  Knee ex machine B 25# 20  Hip ABD 35# 2 x 20   Hip ADD 45 # 2 x 20 open to 2  Hip matrix Abduction 20# 2 x 10  Marches with 2# weight     neuromuscular re-education activities to improve: Coordination, Kinesthetic, Sense, and Proprioception for 10 minutes. The following activities were included:  SLR with QS 20x (some minor lag)  Mini Squat with focus on form to avoid valgus (Vcs and visual cues on proper form)   Knee ex machine 2 up 1 down eccentric 20x 15#-np  TKE red t-band 20x VCs for quad contraction to achieve TKE-np    Patient Education and Home Exercises     Home Exercises Provided and Patient Education Provided     Education provided:     Written Home Exercises Provided: Patient instructed to cont prior HEP. Exercises were reviewed and Sergei was able to demonstrate them prior to the end of the session.  Sergei demonstrated good  understanding of the education provided. See EMR under Patient Instructions for exercises provided during therapy sessions    ASSESSMENT     Sergei was able to progress his exercises today. He continues with moderate trouble with his mini squats prior to verbal and tactile cueing. He was w/o c/o pn throughout tx. His quad and hip fatigue easily and he does exhibit quad and hip weakness with exs. Pt. Was informed to make sure to use handrails if he encounters any stairs in the community.     Sergei Is progressing well towards his goals.   Pt prognosis is Good.     Pt will continue to benefit from skilled outpatient physical therapy to address the deficits listed in the problem list box on initial evaluation, provide pt/family education and to maximize pt's level of independence in the home and community environment.     Pt's spiritual, cultural and educational needs considered and pt agreeable to plan of care and goals.     Anticipated barriers to physical therapy: multiple co-morbidities     Goals: Short Term Goals: 4 weeks   Pt. Will report he is able to walk up stairs more  easily with a handrail for increased ability to go out in the community.   Pt. Will improve bilateral lower extremity to 4-/5 for improved ambulatory endurance.   Pt. Will be able to do single leg stance bilaterally for 15 seconds with no HHA, to decrease likelihood of falls.      Long Term Goals: 8 weeks   Pt. Will report he is able to walk up stairs more easily with a handrail for increased ability to go out in the community.   Pt. Will improve bilateral lower extremity to 4+/5  for improved ambulatory endurance.   Pt. Will be able to do single leg stance bilaterally for 30 seconds with no HHA, to decrease likelihood of falls    PLAN     Plan of care Certification: 7/19/2023 to 09/13/2023.     Outpatient Physical Therapy 1-2 times weekly for 8 weeks to include the following interventions: Cervical/Lumbar Traction, Gait Training, Manual Therapy, Neuromuscular Re-ed, Patient Education, Self Care, Therapeutic Activities, and Therapeutic Exercise    Rocio De La Cruz, PT

## 2023-07-27 ENCOUNTER — CLINICAL SUPPORT (OUTPATIENT)
Dept: REHABILITATION | Facility: HOSPITAL | Age: 78
End: 2023-07-27
Attending: STUDENT IN AN ORGANIZED HEALTH CARE EDUCATION/TRAINING PROGRAM
Payer: MEDICARE

## 2023-07-27 DIAGNOSIS — R29.898 WEAKNESS OF RIGHT LOWER EXTREMITY: ICD-10-CM

## 2023-07-27 PROCEDURE — 97110 THERAPEUTIC EXERCISES: CPT | Mod: HCNC,PO | Performed by: PHYSICAL THERAPIST

## 2023-07-27 PROCEDURE — 97112 NEUROMUSCULAR REEDUCATION: CPT | Mod: HCNC,PO | Performed by: PHYSICAL THERAPIST

## 2023-07-31 ENCOUNTER — CLINICAL SUPPORT (OUTPATIENT)
Dept: REHABILITATION | Facility: HOSPITAL | Age: 78
End: 2023-07-31
Attending: STUDENT IN AN ORGANIZED HEALTH CARE EDUCATION/TRAINING PROGRAM
Payer: MEDICARE

## 2023-07-31 DIAGNOSIS — R29.898 WEAKNESS OF RIGHT LOWER EXTREMITY: Primary | ICD-10-CM

## 2023-07-31 PROCEDURE — 97110 THERAPEUTIC EXERCISES: CPT | Mod: HCNC,PO,CQ

## 2023-07-31 PROCEDURE — 97112 NEUROMUSCULAR REEDUCATION: CPT | Mod: HCNC,PO,CQ

## 2023-07-31 NOTE — PROGRESS NOTES
"[facility]   Physical Therapy Treatment Note     Name: Srinivasan Bernal III  Clinic Number: [id number]    Therapy Diagnosis:   Encounter Diagnosis   Name Primary?    Weakness of right lower extremity Yes       Physician: Harry Whiteside MD    Visit Date: 7/31/2023  Physician Orders: PT Eval and Treat   Post Surgical? No   Eval and Treat Yes   Type of Therapy Outpatient Therapy   Location: Other (see comments)   Referred to Region: Only select region(s) you would like the patient to be seen in if it is outside of the current encounter's department. [facility] ([place]) Comment - Balance, gait, quad      Medical Diagnosis from Referral: M48.061 (ICD-10-CM) - Spinal stenosis of lumbar region, unspecified whether neurogenic claudication present   Evaluation Date: 7/19/2023  Authorization Period Expiration: 7/22/2024  Plan of Care Expiration:  09/13/2023  Progress Note Due: 08/17/2023  Visit # / Visits authorized: 1/ 1, 5/20   FOTO: 1/ 3     Precautions: cancer and CAD FALL RISK, Sudden movements     Time In: 3:40 pm  Time Out: 3:35 pm  Total Billable Time: 55 minutes      SUBJECTIVE     Pt states: " I feel like I'm getting stronger with each of these sessions". He continues to report no R knee pain.    He was compliant with home exercise program.  Response to previous treatment: no adverse effects  Functional change: too soon to assess    Pain: 0/10  Location: right knee      OBJECTIVE     Objective Measures updated at progress report unless specified.     Treatment     Sergei received the treatments listed below:      therapeutic exercises to develop strength, endurance, ROM, flexibility, posture, and core stabilization for 40 minutes including:    Stat bike x 5 minutes  Long arc quad 2 x 10 with a 3#  Short arc quad 2 x 10 with a 3#  GSS x 2 min  HSS 3x30"  Quad sets 2 x 10 hold for 5"  Leg Press 50# 2 x 20  Calf Press 50# 20x  Knee ex machine B 25# 20-NP  Hip ABD 35# 2 x 20 " -NP  Hip ADD 45 # 2 x 20 open to 2-NP  Hip matrix Abduction 20# 2 x 10  +Hip matrix Adduction 20# 2x10  Marches with 2# weight- np  +Step ups 4 in step 2x10    neuromuscular re-education activities to improve: Coordination, Kinesthetic, Sense, and Proprioception for 10 minutes. The following activities were included:  SLR with QS +2# 20x   Mini Squat with focus on form to avoid valgus (Vcs and visual cues on proper form)   Knee ex machine 2 up 1 down eccentric 20x 20#  TKE red t-band 20x VCs for quad contraction to achieve TKE    Patient Education and Home Exercises     Home Exercises Provided and Patient Education Provided     Education provided:     Written Home Exercises Provided: Patient instructed to cont prior HEP. Exercises were reviewed and Sergei was able to demonstrate them prior to the end of the session.  Sergei demonstrated good  understanding of the education provided. See EMR under Patient Instructions for exercises provided during therapy sessions    ASSESSMENT   Sergei tolerated today's exercise progressions well with no increase in pain and appropriate muscular fatigue.  Resistance on LAQ's, SAQ's, and SLR were all increased. Step ups on 4 in step were also added to address pt's report of still having difficulty with stairs when he encounters them. Will assess response to today's exercise progressions and attempt to continue to progress per pt's tolerance.    Sergei Is progressing well towards his goals.   Pt prognosis is Good.     Pt will continue to benefit from skilled outpatient physical therapy to address the deficits listed in the problem list box on initial evaluation, provide pt/family education and to maximize pt's level of independence in the home and community environment.     Pt's spiritual, cultural and educational needs considered and pt agreeable to plan of care and goals.     Anticipated barriers to physical therapy: multiple co-morbidities     Goals: Short Term Goals: 4 weeks   Pt.  Will report he is able to walk up stairs more easily with a handrail for increased ability to go out in the community.   Pt. Will improve bilateral lower extremity to 4-/5 for improved ambulatory endurance.   Pt. Will be able to do single leg stance bilaterally for 15 seconds with no HHA, to decrease likelihood of falls.      Long Term Goals: 8 weeks   Pt. Will report he is able to walk up stairs more easily with a handrail for increased ability to go out in the community.   Pt. Will improve bilateral lower extremity to 4+/5  for improved ambulatory endurance.   Pt. Will be able to do single leg stance bilaterally for 30 seconds with no HHA, to decrease likelihood of falls    PLAN     Plan of care Certification: 7/19/2023 to 09/13/2023.     Outpatient Physical Therapy 1-2 times weekly for 8 weeks to include the following interventions: Cervical/Lumbar Traction, Gait Training, Manual Therapy, Neuromuscular Re-ed, Patient Education, Self Care, Therapeutic Activities, and Therapeutic Exercise    Jasper Beauchamp, PTA

## 2023-08-02 ENCOUNTER — TELEPHONE (OUTPATIENT)
Dept: NEUROLOGY | Facility: CLINIC | Age: 78
End: 2023-08-02
Payer: MEDICARE

## 2023-08-02 NOTE — TELEPHONE ENCOUNTER
----- Message from Darnell Cohen sent at 8/2/2023  4:08 PM CDT -----  Regarding: ret call  Contact: SONIDO CHINCHILLA III [[id number]]  Type:  Patient Returning Call    Who Called:  Sergei    Who Left Message for Patient:  Vanessa    Does the patient know what this is regarding?:  Yes    Best Call Back Number:  [phone]    Additional Information:  Please call to advise.

## 2023-08-04 ENCOUNTER — CLINICAL SUPPORT (OUTPATIENT)
Dept: REHABILITATION | Facility: HOSPITAL | Age: 78
End: 2023-08-04
Attending: STUDENT IN AN ORGANIZED HEALTH CARE EDUCATION/TRAINING PROGRAM
Payer: MEDICARE

## 2023-08-04 DIAGNOSIS — R29.898 WEAKNESS OF RIGHT LOWER EXTREMITY: Primary | ICD-10-CM

## 2023-08-04 PROCEDURE — 97110 THERAPEUTIC EXERCISES: CPT | Mod: HCNC,PO,CQ

## 2023-08-04 PROCEDURE — 97112 NEUROMUSCULAR REEDUCATION: CPT | Mod: HCNC,PO,CQ

## 2023-08-04 NOTE — PROGRESS NOTES
"[facility]   Physical Therapy Treatment Note     Name: Srinivasan Bernal III  Clinic Number: [id number]    Therapy Diagnosis:   Encounter Diagnosis   Name Primary?    Weakness of right lower extremity Yes       Physician: Harry Whiteside MD    Visit Date: 8/4/2023  Physician Orders: PT Eval and Treat   Post Surgical? No   Eval and Treat Yes   Type of Therapy Outpatient Therapy   Location: Other (see comments)   Referred to Region: Only select region(s) you would like the patient to be seen in if it is outside of the current encounter's department. [facility] ([place]) Comment - Balance, gait, quad      Medical Diagnosis from Referral: M48.061 (ICD-10-CM) - Spinal stenosis of lumbar region, unspecified whether neurogenic claudication present   Evaluation Date: 7/19/2023  Authorization Period Expiration: 7/22/2024  Plan of Care Expiration:  09/13/2023  Progress Note Due: 08/17/2023  Visit # / Visits authorized: 1/ 1, 6/20   FOTO: 1/ 3     Precautions: cancer and CAD FALL RISK, Sudden movements     Time In: 8:45 am  Time Out: 9:42 am  Total Billable Time: 57 minutes 40 minutes 1:1      SUBJECTIVE     Pt states: "I can tell I'm making some progress, I have noticed a little difference in going up step". He reports no R knee pain currently but does note increased discomfort with getting up from a chair.    He was compliant with home exercise program.  Response to previous treatment: no adverse effects  Functional change: too soon to assess    Pain: 0/10  Location: right knee      OBJECTIVE     Objective Measures updated at progress report unless specified.     Treatment     Sergei received the treatments listed below:      therapeutic exercises to develop strength, endurance, ROM, flexibility, posture, and core stabilization for 45 minutes including:    Stat bike x 5 minutes  Long arc quad 2 x 10 with a 3#  Short arc quad 2 x 10 with a 3#  GSS x 2 min  HSS 3x30"  +Prone Quad stretch " "3x30"  Quad sets 2 x 10 hold for 5" -NP  Leg Press 50# 2 x 20  Calf Press 50# 30x  Knee ex machine B 25# 20-NP  Hip ABD 35# 2 x 20 -NP  Hip ADD 45 # 2 x 20 open to 2-NP  Hip matrix Abduction 25# 2 x 10  Hip matrix Adduction 25# 2x10  Marches with 2# weight- np  Step ups 4 in step 2x10    neuromuscular re-education activities to improve: Coordination, Kinesthetic, Sense, and Proprioception for 12 minutes. The following activities were included:  SLR with QS 2# 20x   Mini Squat heels on slant board to assist with ankle mobility   Knee ex machine 2 up 1 down eccentric 20x 20#  TKE red t-band 30x VCs for quad contraction to achieve TKE    Patient Education and Home Exercises     Home Exercises Provided and Patient Education Provided     Education provided:     Written Home Exercises Provided: Patient instructed to cont prior HEP. Exercises were reviewed and Sergei was able to demonstrate them prior to the end of the session.  Sergei demonstrated good  understanding of the education provided. See EMR under Patient Instructions for exercises provided during therapy sessions    ASSESSMENT   Sergei returned without complaints of R knee pain but did experience some soreness following exercise progressions following exercise progressions made last visit. Functional R LE strengthening and flexibility exercises continued with addition of prone quad stretch.  Slant board under heels were added during mini squats to assist with ankle mobility. Pt noted increased comfort with motion with this adjustment. Will continue to progress per pt's tolerance.    Sergei Is progressing well towards his goals.   Pt prognosis is Good.     Pt will continue to benefit from skilled outpatient physical therapy to address the deficits listed in the problem list box on initial evaluation, provide pt/family education and to maximize pt's level of independence in the home and community environment.     Pt's spiritual, cultural and educational needs " considered and pt agreeable to plan of care and goals.     Anticipated barriers to physical therapy: multiple co-morbidities     Goals: Short Term Goals: 4 weeks   Pt. Will report he is able to walk up stairs more easily with a handrail for increased ability to go out in the community.   Pt. Will improve bilateral lower extremity to 4-/5 for improved ambulatory endurance.   Pt. Will be able to do single leg stance bilaterally for 15 seconds with no HHA, to decrease likelihood of falls.      Long Term Goals: 8 weeks   Pt. Will report he is able to walk up stairs more easily with a handrail for increased ability to go out in the community.   Pt. Will improve bilateral lower extremity to 4+/5  for improved ambulatory endurance.   Pt. Will be able to do single leg stance bilaterally for 30 seconds with no HHA, to decrease likelihood of falls    PLAN     Plan of care Certification: 7/19/2023 to 09/13/2023.     Outpatient Physical Therapy 1-2 times weekly for 8 weeks to include the following interventions: Cervical/Lumbar Traction, Gait Training, Manual Therapy, Neuromuscular Re-ed, Patient Education, Self Care, Therapeutic Activities, and Therapeutic Exercise    Jasper Beauchamp, PTA

## 2023-08-08 ENCOUNTER — CLINICAL SUPPORT (OUTPATIENT)
Dept: REHABILITATION | Facility: HOSPITAL | Age: 78
End: 2023-08-08
Attending: STUDENT IN AN ORGANIZED HEALTH CARE EDUCATION/TRAINING PROGRAM
Payer: MEDICARE

## 2023-08-08 DIAGNOSIS — R29.898 WEAKNESS OF RIGHT LOWER EXTREMITY: Primary | ICD-10-CM

## 2023-08-08 PROCEDURE — 97110 THERAPEUTIC EXERCISES: CPT | Mod: HCNC,PO | Performed by: PHYSICAL THERAPIST

## 2023-08-08 PROCEDURE — 97112 NEUROMUSCULAR REEDUCATION: CPT | Mod: HCNC,PO | Performed by: PHYSICAL THERAPIST

## 2023-08-08 NOTE — PROGRESS NOTES
"[facility]   Physical Therapy Treatment Note     Name: Srinivasan Bernal III  Clinic Number: [id number]    Therapy Diagnosis:   Encounter Diagnosis   Name Primary?    Weakness of right lower extremity Yes       Physician: Harry Whiteside MD    Visit Date: 8/8/2023  Physician Orders: PT Eval and Treat   Post Surgical? No   Eval and Treat Yes   Type of Therapy Outpatient Therapy   Location: Other (see comments)   Referred to Region: Only select region(s) you would like the patient to be seen in if it is outside of the current encounter's department. [facility] ([place]) Comment - Balance, gait, quad      Medical Diagnosis from Referral: M48.061 (ICD-10-CM) - Spinal stenosis of lumbar region, unspecified whether neurogenic claudication present   Evaluation Date: 7/19/2023  Authorization Period Expiration: 7/22/2024  Plan of Care Expiration:  09/13/2023  Progress Note Due: 08/17/2023  Visit # / Visits authorized: 1/ 1, 13/20   FOTO: 1/ 3     Precautions: cancer and CAD FALL RISK, Sudden movements     Time In: 1:00 pm  Time Out: 2:02 pm  Total Billable Time: 55 minutes       SUBJECTIVE     Pt states: that he is doing pretty good and he only has trouble with sitting to standing if he is doing it multiple times.     He was compliant with home exercise program.  Response to previous treatment: no adverse effects  Functional change: is able to get up steps more easily.     Pain: 0/10  Location: right knee      OBJECTIVE     Objective Measures updated at progress report unless specified.     Treatment     Sergei received the treatments listed below:      therapeutic exercises to develop strength, endurance, ROM, flexibility, posture, and core stabilization for 45 minutes including:    Stat bike x 5 minutes  Long arc quad 3 x 10 with a 4#  Short arc quad 2 x 10 with a 4#  GSS x 2 minimum -NP  HSS 3x30" -NP  +Prone Quad stretch 3x30" -NP  Quad sets 2 x 10 hold for 5" -NP  Leg Press 50# 2 x " 10  Calf Press 50# 2 x 10  Precor HSC 40# 2 x 10   Hip ABD 35# 2 x 20   Hip ADD 45 # 2 x 20   Hip matrix Abduction bilateral 25# 2 x 10  Hip matrix hip flexion bilateral 25# 2x10 (complaints of mild right knee pain)   Hip matrix hip extension bilateral 25# 2x10  Marches with 2# weight  Step ups 4 in step 2x10   Lateral step ups 4 in step 2 x 10   Stairs with gait belt 3x     neuromuscular re-education activities to improve: Coordination, Kinesthetic, Sense, and Proprioception for 10 minutes. The following activities were included:  SLR with QS 4# 20x   Mini Squat heels on slant board to assist with ankle mobility   Knee ex machine 2 up 1 down eccentric 20x 20#  TKE red t-band 30x VCs for quad contraction to achieve TKE    Patient Education and Home Exercises     Home Exercises Provided and Patient Education Provided     Education provided:   -pt educated on right knee brace usage.     Written Home Exercises Provided: Patient instructed to cont prior HEP. Exercises were reviewed and Sergei was able to demonstrate them prior to the end of the session.  Sergei demonstrated good  understanding of the education provided. See EMR under Patient Instructions for exercises provided during therapy sessions    ASSESSMENT   Sergei returned without complaints of R knee pain. Sergei was able to progress with increased weight on some exercises today. Sergei reported Mild right knee pain with right hip flexion on the hip Matrix. Pt. Noted that the slant board during squats made them much easier but did cause some dull/ache pain in the left ankle. Pt. Did much better with the stairs and verbalized that he most definitely had improved. Pt. Was educated on brace usage. Pt. Is progressing well. When Sergei was provided with education about his knee brace he made sure to inform SPT and PT that he would take note of their names and tell  What was said. PT did confirm with Sergei that he should start to wean the brace and only wear it  when he feels instability or when he is going up stairs. He was told that giving him this information does not and cannot guarantee he will not fall or knee will not buckle. PT explained to pt that brace wear is symptomatic based as well that extended wear can cause leg weakness. Since he reports no other issues except with stairs then as he asked, he was told to start weaning brace after this week. If knee loulou and when doing stairs, he should wear the knee brace. Pt can be abrasive at times. PT explained to pt the above, MD notes for reference, and welcomed pt to consult MD.    Sergei Is progressing well towards his goals.   Pt prognosis is Good.     Pt will continue to benefit from skilled outpatient physical therapy to address the deficits listed in the problem list box on initial evaluation, provide pt/family education and to maximize pt's level of independence in the home and community environment.     Pt's spiritual, cultural and educational needs considered and pt agreeable to plan of care and goals.     Anticipated barriers to physical therapy: multiple co-morbidities     Goals: Short Term Goals: 4 weeks   Pt. Will report he is able to walk up stairs more easily with a handrail for increased ability to go out in the community.   Pt. Will improve bilateral lower extremity to 4-/5 for improved ambulatory endurance.   Pt. Will be able to do single leg stance bilaterally for 15 seconds with no HHA, to decrease likelihood of falls.      Long Term Goals: 8 weeks   Pt. Will report he is able to walk up stairs more easily with a handrail for increased ability to go out in the community.   Pt. Will improve bilateral lower extremity to 4+/5  for improved ambulatory endurance.   Pt. Will be able to do single leg stance bilaterally for 30 seconds with no HHA, to decrease likelihood of falls    PLAN     Plan of care Certification: 7/19/2023 to 09/13/2023.     Outpatient Physical Therapy 1-2 times weekly for 8 weeks  to include the following interventions: Cervical/Lumbar Traction, Gait Training, Manual Therapy, Neuromuscular Re-ed, Patient Education, Self Care, Therapeutic Activities, and Therapeutic Exercise    Rocio De La Cruz, PT

## 2023-08-10 ENCOUNTER — OFFICE VISIT (OUTPATIENT)
Dept: FAMILY MEDICINE | Facility: CLINIC | Age: 78
End: 2023-08-10
Payer: MEDICARE

## 2023-08-10 VITALS
SYSTOLIC BLOOD PRESSURE: 116 MMHG | DIASTOLIC BLOOD PRESSURE: 68 MMHG | OXYGEN SATURATION: 97 % | BODY MASS INDEX: 23.23 KG/M2 | HEART RATE: 77 BPM | HEIGHT: 72 IN | WEIGHT: 171.5 LBS | TEMPERATURE: 98 F

## 2023-08-10 DIAGNOSIS — R09.82 PND (POST-NASAL DRIP): ICD-10-CM

## 2023-08-10 DIAGNOSIS — I25.10 CORONARY ARTERIOSCLEROSIS IN NATIVE ARTERY: ICD-10-CM

## 2023-08-10 DIAGNOSIS — E78.5 DYSLIPIDEMIA: ICD-10-CM

## 2023-08-10 DIAGNOSIS — Z00.00 ROUTINE PHYSICAL EXAMINATION: Primary | ICD-10-CM

## 2023-08-10 DIAGNOSIS — I10 ESSENTIAL HYPERTENSION: ICD-10-CM

## 2023-08-10 PROCEDURE — 1125F PR PAIN SEVERITY QUANTIFIED, PAIN PRESENT: ICD-10-PCS | Mod: HCNC,CPTII,S$GLB, | Performed by: INTERNAL MEDICINE

## 2023-08-10 PROCEDURE — 3078F PR MOST RECENT DIASTOLIC BLOOD PRESSURE < 80 MM HG: ICD-10-PCS | Mod: HCNC,CPTII,S$GLB, | Performed by: INTERNAL MEDICINE

## 2023-08-10 PROCEDURE — 1160F RVW MEDS BY RX/DR IN RCRD: CPT | Mod: HCNC,CPTII,S$GLB, | Performed by: INTERNAL MEDICINE

## 2023-08-10 PROCEDURE — 3074F PR MOST RECENT SYSTOLIC BLOOD PRESSURE < 130 MM HG: ICD-10-PCS | Mod: HCNC,CPTII,S$GLB, | Performed by: INTERNAL MEDICINE

## 2023-08-10 PROCEDURE — 3288F PR FALLS RISK ASSESSMENT DOCUMENTED: ICD-10-PCS | Mod: HCNC,CPTII,S$GLB, | Performed by: INTERNAL MEDICINE

## 2023-08-10 PROCEDURE — 1159F MED LIST DOCD IN RCRD: CPT | Mod: HCNC,CPTII,S$GLB, | Performed by: INTERNAL MEDICINE

## 2023-08-10 PROCEDURE — 3288F FALL RISK ASSESSMENT DOCD: CPT | Mod: HCNC,CPTII,S$GLB, | Performed by: INTERNAL MEDICINE

## 2023-08-10 PROCEDURE — 1160F PR REVIEW ALL MEDS BY PRESCRIBER/CLIN PHARMACIST DOCUMENTED: ICD-10-PCS | Mod: HCNC,CPTII,S$GLB, | Performed by: INTERNAL MEDICINE

## 2023-08-10 PROCEDURE — 1159F PR MEDICATION LIST DOCUMENTED IN MEDICAL RECORD: ICD-10-PCS | Mod: HCNC,CPTII,S$GLB, | Performed by: INTERNAL MEDICINE

## 2023-08-10 PROCEDURE — 99397 PR PREVENTIVE VISIT,EST,65 & OVER: ICD-10-PCS | Mod: HCNC,S$GLB,, | Performed by: INTERNAL MEDICINE

## 2023-08-10 PROCEDURE — 1101F PR PT FALLS ASSESS DOC 0-1 FALLS W/OUT INJ PAST YR: ICD-10-PCS | Mod: HCNC,CPTII,S$GLB, | Performed by: INTERNAL MEDICINE

## 2023-08-10 PROCEDURE — 1125F AMNT PAIN NOTED PAIN PRSNT: CPT | Mod: HCNC,CPTII,S$GLB, | Performed by: INTERNAL MEDICINE

## 2023-08-10 PROCEDURE — 99999 PR PBB SHADOW E&M-EST. PATIENT-LVL III: ICD-10-PCS | Mod: PBBFAC,HCNC,, | Performed by: INTERNAL MEDICINE

## 2023-08-10 PROCEDURE — 3078F DIAST BP <80 MM HG: CPT | Mod: HCNC,CPTII,S$GLB, | Performed by: INTERNAL MEDICINE

## 2023-08-10 PROCEDURE — 99999 PR PBB SHADOW E&M-EST. PATIENT-LVL III: CPT | Mod: PBBFAC,HCNC,, | Performed by: INTERNAL MEDICINE

## 2023-08-10 PROCEDURE — 3074F SYST BP LT 130 MM HG: CPT | Mod: HCNC,CPTII,S$GLB, | Performed by: INTERNAL MEDICINE

## 2023-08-10 PROCEDURE — 1101F PT FALLS ASSESS-DOCD LE1/YR: CPT | Mod: HCNC,CPTII,S$GLB, | Performed by: INTERNAL MEDICINE

## 2023-08-10 PROCEDURE — 99397 PER PM REEVAL EST PAT 65+ YR: CPT | Mod: HCNC,S$GLB,, | Performed by: INTERNAL MEDICINE

## 2023-08-10 RX ORDER — FLUTICASONE PROPIONATE 50 MCG
2 SPRAY, SUSPENSION (ML) NASAL DAILY
Qty: 16 G | Refills: 11 | Status: SHIPPED | OUTPATIENT
Start: 2023-08-10 | End: 2023-10-12

## 2023-08-10 NOTE — PROGRESS NOTES
Subjective:       Patient ID: Srinivasan Bernal III is a 78 y.o. male.  Chief Complaint: Annual Exam     HPI      Here for routine health maintenance.      Low WBC - mild; close to trend.     mild memory changes.  Mostly with names and phrases.  Stable      PND - uncontrolled.       CAD s/p NEW stent 4/2023- on Brilinta; hx 2 previous stents. No chest pain.  Dr Hernandez.   HTN - controlled  HLD - uncontrolled for goal < 70 (range has always been 68 - 78) - disease progressed.    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 Routine physical examination    2. Essential hypertension    3. Dyslipidemia    4. Coronary arteriosclerosis in native artery    5. PND (post-nasal drip)        Plan:       Routine physical examination    Essential hypertension  -     Comprehensive Metabolic Panel; Future; Expected date: 02/06/2024    Dyslipidemia  -     Lipid Panel; Future; Expected date: 02/06/2024    Coronary arteriosclerosis in native artery    PND (post-nasal drip)  -     fluticasone propionate (FLONASE) 50 mcg/actuation nasal spray; 2 sprays (100 mcg total) by Each Nostril route once daily.  Dispense: 16 g; Refill: 11              Wellness reviewed         Continue current management and monitor.  Other diagnoses were reviewed and found stable and will continue to monitor.  Counseled on regular exercise, maintenance of a healthy weight, balanced diet rich in fruits/vegetables and lean protein, and avoidance of unhealthy habits like smoking and excessive alcohol intake.   Also, counseled on importance of being compliant with medication, health appointments, diet and exercise.     Follow up in about 6 months (around 2/10/2024).      Medication List with Changes/Refills   New Medications    FLUTICASONE PROPIONATE (FLONASE) 50 MCG/ACTUATION NASAL SPRAY    2 sprays  "(100 mcg total) by Each Nostril route once daily.   Current Medications    ASPIRIN (ECOTRIN) 81 MG EC TABLET    Take 81 mg by mouth once daily.    ATORVASTATIN (LIPITOR) 80 MG TABLET    TAKE 1 TABLET ONE TIME DAILY    EZETIMIBE (ZETIA) 10 MG TABLET    TAKE 1 TABLET ONE TIME DAILY    IBUPROFEN (ADVIL,MOTRIN) 200 MG TABLET    Take 200 mg by mouth every 6 (six) hours as needed for Pain.    METHOCARBAMOL (ROBAXIN) 750 MG TAB    Take 1 tablet (750 mg total) by mouth 4 (four) times daily as needed.    PERINDOPRIL ERBUMINE (ACEON) 4 MG TABLET    TAKE 1 TABLET ONE TIME DAILY    TADALAFIL (CIALIS) 20 MG TAB    Take 1 tablet (20 mg total) by mouth once daily.    TICAGRELOR (BRILINTA) 90 MG TABLET    Take 1 tablet twice a day by oral route for 90 days.       BP Readings from Last 3 Encounters:   08/10/23 116/68   07/10/23 132/82   06/22/23 110/71     No results found for: "HGBA1C"  No results found for: "TSH"  Lab Results   Component Value Date    LDLCALC 77.2 06/28/2023    LDLCALC 72.6 01/04/2023    LDLCALC 63.2 06/29/2022     Lab Results   Component Value Date    TRIG 54 06/28/2023    TRIG 47 01/04/2023    TRIG 54 06/29/2022     Wt Readings from Last 3 Encounters:   08/10/23 77.8 kg (171 lb 8.3 oz)   07/20/23 75.5 kg (166 lb 7.2 oz)   07/10/23 75.5 kg (166 lb 7.2 oz)     Lab Results   Component Value Date    HGB 14.2 06/28/2023    HCT 43.5 06/28/2023    WBC 5.26 06/28/2023    ALT 21 06/28/2023    AST 21 06/28/2023     06/28/2023    K 4.2 06/28/2023    CREATININE 0.9 06/28/2023    PSA 0.90 01/04/2023           Review of Systems   Constitutional:  Negative for diaphoresis and fever.   HENT:  Negative for drooling and nosebleeds.    Eyes:  Negative for discharge and redness.   Respiratory:  Negative for apnea and choking.    Cardiovascular:  Negative for chest pain and palpitations.   Gastrointestinal:  Negative for abdominal pain and nausea.   Skin:  Negative for color change.   Neurological:  Negative for seizures and " syncope.   Psychiatric/Behavioral:  Negative for behavioral problems.          Objective:      Vitals:    08/10/23 0817   BP: 116/68   Pulse: 77   Temp: 97.6 °F (36.4 °C)     Physical Exam  Vitals reviewed.   Eyes:      Conjunctiva/sclera: Conjunctivae normal.   Neck:      Thyroid: No thyromegaly.      Trachea: Trachea normal.   Cardiovascular:      Rate and Rhythm: Normal rate.      Comments: Edema negative  Pulmonary:      Effort: Pulmonary effort is normal.      Breath sounds: Normal breath sounds.   Abdominal:      General: Bowel sounds are normal.      Palpations: Abdomen is soft. There is no hepatomegaly.   Musculoskeletal:      Cervical back: Normal range of motion.   Skin:     General: Skin is warm and dry.   Neurological:      Mental Status: He is alert.      Comments: DTR decreased bilateral   Psychiatric:      Comments: Alert and Oriented

## 2023-08-11 ENCOUNTER — DOCUMENTATION ONLY (OUTPATIENT)
Dept: REHABILITATION | Facility: HOSPITAL | Age: 78
End: 2023-08-11
Payer: MEDICARE

## 2023-08-14 ENCOUNTER — CLINICAL SUPPORT (OUTPATIENT)
Dept: REHABILITATION | Facility: HOSPITAL | Age: 78
End: 2023-08-14
Attending: STUDENT IN AN ORGANIZED HEALTH CARE EDUCATION/TRAINING PROGRAM
Payer: MEDICARE

## 2023-08-14 DIAGNOSIS — R29.898 WEAKNESS OF RIGHT LOWER EXTREMITY: Primary | ICD-10-CM

## 2023-08-14 PROCEDURE — 97110 THERAPEUTIC EXERCISES: CPT | Mod: HCNC,PO,CQ

## 2023-08-14 PROCEDURE — 97112 NEUROMUSCULAR REEDUCATION: CPT | Mod: HCNC,PO,CQ

## 2023-08-14 NOTE — PROGRESS NOTES
"[facility]   Physical Therapy Treatment Note     Name: Srinivasan Bernal III  Clinic Number: [id number]    Therapy Diagnosis:   Encounter Diagnosis   Name Primary?    Weakness of right lower extremity Yes       Physician: Harry Whiteside MD    Visit Date: 8/14/2023  Physician Orders: PT Eval and Treat   Post Surgical? No   Eval and Treat Yes   Type of Therapy Outpatient Therapy   Location: Other (see comments)   Referred to Region: Only select region(s) you would like the patient to be seen in if it is outside of the current encounter's department. [facility] ([place]) Comment - Balance, gait, quad      Medical Diagnosis from Referral: M48.061 (ICD-10-CM) - Spinal stenosis of lumbar region, unspecified whether neurogenic claudication present   Evaluation Date: 7/19/2023  Authorization Period Expiration: 7/22/2024  Plan of Care Expiration:  09/13/2023  Progress Note Due: 08/17/2023  Visit # / Visits authorized: 1/ 1, 14/20   FOTO: 1/ 3     Precautions: cancer and CAD FALL RISK, Sudden movements     Time In: 1:00 pm  Time Out: 1:55 pm  Total Billable Time: 55 minutes       SUBJECTIVE     Pt states: no R knee pain currently and has been weaning off wearing knee brace per previous PT's advice. He experienced no instances of increased pain or knee buckling since last visit.    He was compliant with home exercise program.  Response to previous treatment: no adverse effects  Functional change: is able to get up steps more easily.     Pain: 0/10  Location: right knee      OBJECTIVE     Objective Measures updated at progress report unless specified.     Treatment     Sergei received the treatments listed below:      therapeutic exercises to develop strength, endurance, ROM, flexibility, posture, and core stabilization for 45 minutes including:    Stat bike x 5 minutes  Long arc quad 3 x 10 with a 4#  Short arc quad 2 x 10 with a 4#  GSS x 2 minimum   HSS 3x30" -NP  Prone Quad stretch " "3x30"   Quad sets 2 x 10 hold for 5" -NP  Leg Press 50# 3x10  Calf Press 50# 3x10  Precor HSC 40# 2 x 10   Hip ABD 35# 2 x 20 -NP  Hip ADD 45 # 2 x 20 -NP  Hip matrix Abduction bilateral 25# 2 x 10-NP  Hip matrix hip flexion bilateral 25# 2x10 (complaints of mild right knee pain) -NP  Hip matrix hip extension bilateral 25# 2x10-NP  Marches with 2# weight-NP  Step ups 4 in step 2x10   Lateral step ups 4 in step 2 x 10   Stairs with gait belt 3x -NP    neuromuscular re-education activities to improve: Coordination, Kinesthetic, Sense, and Proprioception for 10 minutes. The following activities were included:  SLR with QS 4# 20x   Mini Squat heels on slant board to assist with ankle mobility x30  Knee ex machine 2 up 1 down eccentric 30x 25#  TKE red t-band 30x VCs for quad contraction to achieve TKE    Patient Education and Home Exercises     Home Exercises Provided and Patient Education Provided     Education provided:   -pt educated on right knee brace usage.     Written Home Exercises Provided: Patient instructed to cont prior HEP. Exercises were reviewed and Sergei was able to demonstrate them prior to the end of the session.  Sergei demonstrated good  understanding of the education provided. See EMR under Patient Instructions for exercises provided during therapy sessions    ASSESSMENT   Sergei returned without complaints of R knee pain and has been weaning off of wearing knee brace per  PT's advice. Functional LE flexibility and strengthening ex's continued and progressed by increasing repetitions and resistance on a number of machines. He tolerated exercise progressions well with no increase in pain. He does note increase anterior knee "stiffness" with mini squats.  Will continue to attempt to progress per pt's tolerance.    Sergei Is progressing well towards his goals.   Pt prognosis is Good.     Pt will continue to benefit from skilled outpatient physical therapy to address the deficits listed in the problem " list box on initial evaluation, provide pt/family education and to maximize pt's level of independence in the home and community environment.     Pt's spiritual, cultural and educational needs considered and pt agreeable to plan of care and goals.     Anticipated barriers to physical therapy: multiple co-morbidities     Goals: Short Term Goals: 4 weeks   Pt. Will report he is able to walk up stairs more easily with a handrail for increased ability to go out in the community.   Pt. Will improve bilateral lower extremity to 4-/5 for improved ambulatory endurance.   Pt. Will be able to do single leg stance bilaterally for 15 seconds with no HHA, to decrease likelihood of falls.      Long Term Goals: 8 weeks   Pt. Will report he is able to walk up stairs more easily with a handrail for increased ability to go out in the community.   Pt. Will improve bilateral lower extremity to 4+/5  for improved ambulatory endurance.   Pt. Will be able to do single leg stance bilaterally for 30 seconds with no HHA, to decrease likelihood of falls    PLAN     Plan of care Certification: 7/19/2023 to 09/13/2023.     Outpatient Physical Therapy 1-2 times weekly for 8 weeks to include the following interventions: Cervical/Lumbar Traction, Gait Training, Manual Therapy, Neuromuscular Re-ed, Patient Education, Self Care, Therapeutic Activities, and Therapeutic Exercise    Jasper Beauchamp, PTA

## 2023-08-16 ENCOUNTER — CLINICAL SUPPORT (OUTPATIENT)
Dept: REHABILITATION | Facility: HOSPITAL | Age: 78
End: 2023-08-16
Attending: STUDENT IN AN ORGANIZED HEALTH CARE EDUCATION/TRAINING PROGRAM
Payer: MEDICARE

## 2023-08-16 DIAGNOSIS — R29.898 WEAKNESS OF RIGHT LOWER EXTREMITY: Primary | ICD-10-CM

## 2023-08-16 PROCEDURE — 97110 THERAPEUTIC EXERCISES: CPT | Mod: HCNC,PO,CQ

## 2023-08-16 PROCEDURE — 97112 NEUROMUSCULAR REEDUCATION: CPT | Mod: HCNC,PO,CQ

## 2023-08-16 NOTE — PROGRESS NOTES
"[facility]   Physical Therapy Treatment Note     Name: Srinivasan Bernal III  Clinic Number: [id number]    Therapy Diagnosis:   Encounter Diagnosis   Name Primary?    Weakness of right lower extremity Yes       Physician: Harry Whiteside MD    Visit Date: 8/16/2023  Physician Orders: PT Eval and Treat   Post Surgical? No   Eval and Treat Yes   Type of Therapy Outpatient Therapy   Location: Other (see comments)   Referred to Region: Only select region(s) you would like the patient to be seen in if it is outside of the current encounter's department. [facility] ([place]) Comment - Balance, gait, quad      Medical Diagnosis from Referral: M48.061 (ICD-10-CM) - Spinal stenosis of lumbar region, unspecified whether neurogenic claudication present   Evaluation Date: 7/19/2023  Authorization Period Expiration: 7/22/2024  Plan of Care Expiration:  09/13/2023  Progress Note Due: 08/17/2023  Visit # / Visits authorized: 1/ 1, 15/20   FOTO: 1/ 3     Precautions: cancer and CAD FALL RISK, Sudden movements     Time In: 1:00 pm  Time Out: 2:00 pm  Total Billable Time: 60 minutes 30 minutes 1:1      SUBJECTIVE     Pt states: "feeling it in the knee a little bit today". He arrived wearing his knee brace again due to increased lateral and anterior R knee pain mainly during knee flexion.    He was compliant with home exercise program.  Response to previous treatment: no adverse effects  Functional change: is able to get up steps more easily.     Pain: 0/10  Location: right knee      OBJECTIVE     Objective Measures updated at progress report unless specified.     Treatment     Sergei received the treatments listed below:      therapeutic exercises to develop strength, endurance, ROM, flexibility, posture, and core stabilization for 45 minutes including:    Stat bike x 5 minutes  Long arc quad 3 x 10 with a 4#  Short arc quad 3 x 10 with a 4#  GSS x 2 minutes  HSS 3x30" -NP  Prone Quad stretch " "3x30"   Quad sets 2 x 10 hold for 5" -NP  Leg Press 50# 3x10  Calf Press 50# 3x10  Precor HSC 40# 2 x 10   Hip ABD 35# 2 x 20 -NP  Hip ADD 45 # 2 x 20 -NP  Hip matrix Abduction bilateral 25# 2 x 10-NP  Hip matrix hip flexion bilateral 25# 2x10 (complaints of mild right knee pain) -NP  Hip matrix hip extension bilateral 25# 2x10-NP  Marches with 2# weight-NP  Step ups 4 in step 2x10   Lateral step ups 4 in step 2 x 10   +IT band stretch 3x30"  +lunge on 8"step x20 5" hold  Stairs with gait belt 3x -NP    neuromuscular re-education activities to improve: Coordination, Kinesthetic, Sense, and Proprioception for 15 minutes. The following activities were included:  SLR with QS 4# 20x   Mini Squat heels on slant board to assist with ankle mobility x30  Knee ex machine 2 up 1 down eccentric 30x 25#  TKE red t-band 30x VCs for quad contraction to achieve TKE    Patient Education and Home Exercises     Home Exercises Provided and Patient Education Provided     Education provided:   -pt educated on right knee brace usage.     Written Home Exercises Provided: Patient instructed to cont prior HEP. Exercises were reviewed and Sergei was able to demonstrate them prior to the end of the session.  Sergei demonstrated good  understanding of the education provided. See EMR under Patient Instructions for exercises provided during therapy sessions    ASSESSMENT   Sergei returned reporting no R knee pain at rest but increased pain with movements involving knee flexion, like standing up form a chair. Functional LE flexibility and strengthening ex's continued and progressing by adding lunges onto 8 inch step in effort to increase weight bearing tolerance in knee flexion. Sergei reported increased lateral knee pain with leg ext machine. IT band stretch was added to address lateral knee pain.Will monitor and attempt to progress per pt's tolerance.    Sergei Is progressing well towards his goals.   Pt prognosis is Good.     Pt will continue " to benefit from skilled outpatient physical therapy to address the deficits listed in the problem list box on initial evaluation, provide pt/family education and to maximize pt's level of independence in the home and community environment.     Pt's spiritual, cultural and educational needs considered and pt agreeable to plan of care and goals.     Anticipated barriers to physical therapy: multiple co-morbidities     Goals: Short Term Goals: 4 weeks   Pt. Will report he is able to walk up stairs more easily with a handrail for increased ability to go out in the community.   Pt. Will improve bilateral lower extremity to 4-/5 for improved ambulatory endurance.   Pt. Will be able to do single leg stance bilaterally for 15 seconds with no HHA, to decrease likelihood of falls.      Long Term Goals: 8 weeks   Pt. Will report he is able to walk up stairs more easily with a handrail for increased ability to go out in the community.   Pt. Will improve bilateral lower extremity to 4+/5  for improved ambulatory endurance.   Pt. Will be able to do single leg stance bilaterally for 30 seconds with no HHA, to decrease likelihood of falls    PLAN     Plan of care Certification: 7/19/2023 to 09/13/2023.     Outpatient Physical Therapy 1-2 times weekly for 8 weeks to include the following interventions: Cervical/Lumbar Traction, Gait Training, Manual Therapy, Neuromuscular Re-ed, Patient Education, Self Care, Therapeutic Activities, and Therapeutic Exercise    Jasper Beauchamp, PTA

## 2023-08-24 ENCOUNTER — CLINICAL SUPPORT (OUTPATIENT)
Dept: REHABILITATION | Facility: HOSPITAL | Age: 78
End: 2023-08-24
Attending: STUDENT IN AN ORGANIZED HEALTH CARE EDUCATION/TRAINING PROGRAM
Payer: MEDICARE

## 2023-08-24 ENCOUNTER — OFFICE VISIT (OUTPATIENT)
Dept: ORTHOPEDICS | Facility: CLINIC | Age: 78
End: 2023-08-24
Payer: MEDICARE

## 2023-08-24 VITALS — BODY MASS INDEX: 23.16 KG/M2 | HEIGHT: 72 IN | WEIGHT: 171 LBS

## 2023-08-24 DIAGNOSIS — M23.51 CHRONIC INSTABILITY OF RIGHT KNEE: Primary | ICD-10-CM

## 2023-08-24 DIAGNOSIS — R29.898 WEAKNESS OF RIGHT LOWER EXTREMITY: Primary | ICD-10-CM

## 2023-08-24 PROCEDURE — 1125F AMNT PAIN NOTED PAIN PRSNT: CPT | Mod: HCNC,CPTII,S$GLB, | Performed by: ORTHOPAEDIC SURGERY

## 2023-08-24 PROCEDURE — 99999 PR PBB SHADOW E&M-EST. PATIENT-LVL III: ICD-10-PCS | Mod: PBBFAC,HCNC,, | Performed by: ORTHOPAEDIC SURGERY

## 2023-08-24 PROCEDURE — 1101F PR PT FALLS ASSESS DOC 0-1 FALLS W/OUT INJ PAST YR: ICD-10-PCS | Mod: HCNC,CPTII,S$GLB, | Performed by: ORTHOPAEDIC SURGERY

## 2023-08-24 PROCEDURE — 1101F PT FALLS ASSESS-DOCD LE1/YR: CPT | Mod: HCNC,CPTII,S$GLB, | Performed by: ORTHOPAEDIC SURGERY

## 2023-08-24 PROCEDURE — 97112 NEUROMUSCULAR REEDUCATION: CPT | Mod: HCNC,PO | Performed by: PHYSICAL THERAPIST

## 2023-08-24 PROCEDURE — 1125F PR PAIN SEVERITY QUANTIFIED, PAIN PRESENT: ICD-10-PCS | Mod: HCNC,CPTII,S$GLB, | Performed by: ORTHOPAEDIC SURGERY

## 2023-08-24 PROCEDURE — 1159F MED LIST DOCD IN RCRD: CPT | Mod: HCNC,CPTII,S$GLB, | Performed by: ORTHOPAEDIC SURGERY

## 2023-08-24 PROCEDURE — 3288F PR FALLS RISK ASSESSMENT DOCUMENTED: ICD-10-PCS | Mod: HCNC,CPTII,S$GLB, | Performed by: ORTHOPAEDIC SURGERY

## 2023-08-24 PROCEDURE — 99999 PR PBB SHADOW E&M-EST. PATIENT-LVL III: CPT | Mod: PBBFAC,HCNC,, | Performed by: ORTHOPAEDIC SURGERY

## 2023-08-24 PROCEDURE — 97110 THERAPEUTIC EXERCISES: CPT | Mod: HCNC,PO | Performed by: PHYSICAL THERAPIST

## 2023-08-24 PROCEDURE — 1160F PR REVIEW ALL MEDS BY PRESCRIBER/CLIN PHARMACIST DOCUMENTED: ICD-10-PCS | Mod: HCNC,CPTII,S$GLB, | Performed by: ORTHOPAEDIC SURGERY

## 2023-08-24 PROCEDURE — 1159F PR MEDICATION LIST DOCUMENTED IN MEDICAL RECORD: ICD-10-PCS | Mod: HCNC,CPTII,S$GLB, | Performed by: ORTHOPAEDIC SURGERY

## 2023-08-24 PROCEDURE — 99213 PR OFFICE/OUTPT VISIT, EST, LEVL III, 20-29 MIN: ICD-10-PCS | Mod: HCNC,S$GLB,, | Performed by: ORTHOPAEDIC SURGERY

## 2023-08-24 PROCEDURE — 1160F RVW MEDS BY RX/DR IN RCRD: CPT | Mod: HCNC,CPTII,S$GLB, | Performed by: ORTHOPAEDIC SURGERY

## 2023-08-24 PROCEDURE — 3288F FALL RISK ASSESSMENT DOCD: CPT | Mod: HCNC,CPTII,S$GLB, | Performed by: ORTHOPAEDIC SURGERY

## 2023-08-24 PROCEDURE — 99213 OFFICE O/P EST LOW 20 MIN: CPT | Mod: HCNC,S$GLB,, | Performed by: ORTHOPAEDIC SURGERY

## 2023-08-24 NOTE — PROGRESS NOTES
"[facility]   Physical Therapy Treatment Note     Name: Srinivasan Bernal III  Clinic Number: [id number]    Therapy Diagnosis:   Encounter Diagnosis   Name Primary?    Weakness of right lower extremity Yes       Physician: Harry Whiteside MD    Visit Date: 8/24/2023  Physician Orders: PT Eval and Treat   Post Surgical? No   Eval and Treat Yes   Type of Therapy Outpatient Therapy   Location: Other (see comments)   Referred to Region: Only select region(s) you would like the patient to be seen in if it is outside of the current encounter's department. [facility] ([place]) Comment - Balance, gait, quad      Medical Diagnosis from Referral: M48.061 (ICD-10-CM) - Spinal stenosis of lumbar region, unspecified whether neurogenic claudication present   Evaluation Date: 7/19/2023  Authorization Period Expiration: 7/22/2024  Plan of Care Expiration:  09/13/2023  Progress Note Due: 08/17/2023, 9/23/2023  Visit # / Visits authorized: 1/ 1, 16/20   FOTO: 2/ 3     Precautions: cancer and CAD FALL RISK, Sudden movements     Time In: 1:00 pm  Time Out: 2:00 pm  Total Billable Time: 60 minutes 30 minutes 1:1      SUBJECTIVE     Pt states: "feeling it in the knee a little bit today". He arrived wearing his knee brace again due to increased lateral and anterior R knee pain mainly during knee flexion.    He was compliant with home exercise program.  Response to previous treatment: no adverse effects  Functional change: is able to get up steps more easily.     Pain: 0/10  Location: right knee      OBJECTIVE     Objective Measures updated at progress report unless specified.   Gait: pt. Normal gt but wearing brace     Quad girth:  Right 43  Left 46.5    Lower Extremity Strength  Right LE   Left LE     Knee extension: 4-/5 Knee extension: 4/5   Knee flexion: 4-/5 Knee flexion: 4/5   Hip flexion: 3/5 Hip flexion: 3/5   Hip extension:  3/5 Hip extension: 3+/5   Hip abduction: 4/5 Hip abduction: 4/5   Hip " "adduction: 4/5 Hip adduction 4/5   Ankle dorsiflexion: 4/5 Ankle dorsiflexion: 5/5   Ankle plantarflexion: 4/5 Ankle plantarflexion: 5/5      Range of Motion:   Knee Left active Left Passive Right Active R passive   Flexion 130 135 + 115  123   Extension 0 NT -7 -3      Function:  Balance testing      Left le seconds with multiple HHAs (<4 s without HHA)  Right le seconds with multiple HHAs (<3 s without HHA)  Right Tandem stance: 30 seconds on floor with mild sway   Left Tandem stance: 30 seconds on floor with mild sway      Step down test: weak, juddering     Treatment     Sergei received the treatments listed below:      therapeutic exercises to develop strength, endurance, ROM, flexibility, posture, and core stabilization for 50 minutes including:    Stat bike x 5 minutes, L4  Long arc quad 3 x 10, 3" hold with a 4#  Short arc quad 3 x 10, 3" hold with a 4#  GSS x 2 minutes  Leg Press 50# 3x10  Calf Press 50# 3x10  SINGLE LEG calf press 2 x 10, 20#  Reassessment.    Not performed:  Prone Quad stretch 3x30"   Precor HSC 40# 2 x 10   Hip ABD 35# 2 x 20 -NP  Hip ADD 45 # 2 x 20 -NP  Hip matrix Abduction bilateral 25# 2 x 10-NP  Hip matrix hip flexion bilateral 25# 2x10 (complaints of mild right knee pain) -NP  Hip matrix hip extension bilateral 25# 2x10-NP  Marches with 4# weight 2 x 15  Step ups 4 in step 2x10   Lateral step ups 4 in step 2 x 10   +IT band stretch 3x30"  +lunge on 8"step x20 5" hold  Stairs with gait belt 3x -NP    neuromuscular re-education activities to improve: Coordination, Kinesthetic, Sense, and Proprioception for 08 minutes. The following activities were included:  SINGLE LEG STANCE 3 x 30" with HHA as needed  Tandem stance 2 x 30" each    Not performed:  SLR with QS 4# 20x   Mini Squat heels on slant board to assist with ankle mobility x30  Knee ex machine 2 up 1 down eccentric 30x 25#  TKE red t-band 30x VCs for quad contraction to achieve TKE    Patient Education and Home " Exercises     Home Exercises Provided and Patient Education Provided     Education provided:   -Cont HEP    Written Home Exercises Provided: Patient instructed to cont prior HEP. Exercises were reviewed and Sergei was able to demonstrate them prior to the end of the session.  Sergei demonstrated good  understanding of the education provided. See EMR under Patient Instructions for exercises provided during therapy sessions    ASSESSMENT   Sergei returned reporting no R knee pain at rest but he is to cont with knee brace d/t instability of right knee vs surgery. He is only mildly improved in LE strength and balance. He tolerated a SINGLE LEG leg press today for the first time. He continues with mid range quad weakness and significant right quad atrophy. He reports mild improvement in stairs with (U) HR.     Sergei Is progressing well towards his goals.   Pt prognosis is Good.     Pt will continue to benefit from skilled outpatient physical therapy to address the deficits listed in the problem list box on initial evaluation, provide pt/family education and to maximize pt's level of independence in the home and community environment.     Pt's spiritual, cultural and educational needs considered and pt agreeable to plan of care and goals.     Anticipated barriers to physical therapy: multiple co-morbidities     Goals: Short Term Goals: 4 weeks   Pt. Will report he is able to walk up stairs more easily with a handrail for increased ability to go out in the community.    -MET, 8/24/2023  Pt. Will improve bilateral lower extremity to 4-/5 for improved ambulatory endurance.    -ongoing  Pt. Will be able to do single leg stance bilaterally for 15 seconds with no HHA, to decrease likelihood of falls.    -ongoing     Long Term Goals: 8 weeks   Pt. Will improve bilateral lower extremity to 4+/5  for improved ambulatory endurance.   Pt. Will be able to do single leg stance bilaterally for 30 seconds with no HHA, to decrease  likelihood of falls    PLAN     Plan of care Certification: 7/19/2023 to 09/13/2023.     Outpatient Physical Therapy 1-2 times weekly for 8 weeks to include the following interventions: Cervical/Lumbar Traction, Gait Training, Manual Therapy, Neuromuscular Re-ed, Patient Education, Self Care, Therapeutic Activities, and Therapeutic Exercise    Rocio De La Cruz, PT

## 2023-08-28 NOTE — PROGRESS NOTES
Chief Complaint   Patient presents with    Right Knee - Pain       HPI:   This is a 78 y.o. who returns today status post right TKA at OSH > 10 years ago. Patient has been ambulating well in the brace.  Pain is dull. No numbness or tingling. No associated signs or symptoms.    Past Medical History:   Diagnosis Date    Cataracts, bilateral     2009, 2013    Coronary artery disease 2004    Hypercholesteremia     Kidney stone 2015    Urerteroscopy    Skin cancer 2015    left ankle/ left ear     Past Surgical History:   Procedure Laterality Date    ANKLE FUSION      CIRCUMCISION      COLONOSCOPY N/A 09/20/2022    Procedure: COLONOSCOPY;  Surgeon: Natasha Sommer MD;  Location: [facility] END[facility];  Service: Endoscopy;  Laterality: N/A;    FOOT SURGERY      HERNIA REPAIR Left 2008    KNEE ARTHROSCOPY Left 2010    KNEE ARTHROSCOPY Right 2010    KNEE SURGERY Left 2010    partial replacement    KNEE SURGERY Right 2012    replacement    MOUTH SURGERY      STENT, DRUG ELUTING, SINGLE VESSEL, CORONARY      TOE SURGERY      TONSILLECTOMY       Current Outpatient Medications on File Prior to Visit   Medication Sig Dispense Refill    aspirin (ECOTRIN) 81 MG EC tablet Take 81 mg by mouth once daily.      atorvastatin (LIPITOR) 80 MG tablet TAKE 1 TABLET ONE TIME DAILY 90 tablet 1    ezetimibe (ZETIA) 10 mg tablet TAKE 1 TABLET ONE TIME DAILY 90 tablet 1    fluticasone propionate (FLONASE) 50 mcg/actuation nasal spray 2 sprays (100 mcg total) by Each Nostril route once daily. 16 g 11    ibuprofen (ADVIL,MOTRIN) 200 MG tablet Take 200 mg by mouth every 6 (six) hours as needed for Pain.      perindopril erbumine (ACEON) 4 mg tablet TAKE 1 TABLET ONE TIME DAILY 90 tablet 2    ticagrelor (BRILINTA) 90 mg tablet Take 1 tablet twice a day by oral route for 90 days.      methocarbamoL (ROBAXIN) 750 MG Tab Take 1 tablet (750 mg total) by mouth 4 (four) times daily as needed. (Patient not taking: Reported on 8/10/2023) 44 tablet 3    tadalafiL  (CIALIS) 20 MG Tab Take 1 tablet (20 mg total) by mouth once daily. 30 tablet 11     No current facility-administered medications on file prior to visit.     Review of patient's allergies indicates:  No Known Allergies  Family History   Problem Relation Age of Onset    Heart disease Father     Dementia Mother      Social History     Socioeconomic History    Marital status:    Tobacco Use    Smoking status: Some Days     Types: Cigars    Smokeless tobacco: Never   Substance and Sexual Activity    Alcohol use: Yes     Comment: rarely    Drug use: No    Sexual activity: Yes     Social Determinants of Health     Financial Resource Strain: Low Risk  (8/9/2023)    Overall Financial Resource Strain (CARDIA)     Difficulty of Paying Living Expenses: Not hard at all   Food Insecurity: No Food Insecurity (8/9/2023)    Hunger Vital Sign     Worried About Running Out of Food in the Last Year: Never true     Ran Out of Food in the Last Year: Never true   Transportation Needs: No Transportation Needs (8/9/2023)    PRAPARE - Transportation     Lack of Transportation (Medical): No     Lack of Transportation (Non-Medical): No   Physical Activity: Sufficiently Active (8/9/2023)    Exercise Vital Sign     Days of Exercise per Week: 7 days     Minutes of Exercise per Session: 40 min   Stress: No Stress Concern Present (8/9/2023)    [employer] [place] [employer] - Occupational Stress Questionnaire     Feeling of Stress : Not at all   Social Connections: Unknown (8/9/2023)    Social Connection and Isolation Panel [NHANES]     Frequency of Communication with Friends and Family: More than three times a week     Frequency of Social Gatherings with Friends and Family: More than three times a week     Active Member of Clubs or Organizations: Yes     Attends Club or Organization Meetings: More than 4 times per year     Marital Status:    Housing Stability: Low Risk  (8/9/2023)    Housing Stability Vital Sign     Unable  to Pay for Housing in the Last Year: No     Number of Places Lived in the Last Year: 1     Unstable Housing in the Last Year: No       Review of Systems:  Constitutional:  Denies fever or chills   Eyes:  Denies change in visual acuity   HENT:  Denies nasal congestion or sore throat   Respiratory:  Denies cough or shortness of breath   Cardiovascular:  Denies chest pain or edema   GI:  Denies abdominal pain, nausea, vomiting, bloody stools or diarrhea   :  Denies dysuria   Integument:  Denies rash   Neurologic:  Denies headache, focal weakness or sensory changes   Endocrine:  Denies polyuria or polydipsia   Lymphatic:  Denies swollen glands   Psychiatric:  Denies depression or anxiety     Physical Exam:   Constitutional:  Well developed, well nourished, no acute distress, non-toxic appearance   Integument:  Well hydrated, no rash   Lymphatic:  No lymphadenopathy noted   Neurologic:  Alert & oriented x 3, CN 2-12 normal, normal motor function, normal sensory function, no focal deficits noted   Psychiatric:  Speech and behavior appropriate   Gi: abdomen soft  Eyes: EOMI    L knee  Exam performed same as contralateral side and is normal  R knee  FROM with no pain. Persistent instability noted on exam. Overall normal alignment. Skin intact. Compartments soft. NVI distally.       Chronic instability of right knee        Continue brace as he is tolerating it well. RTC 2 months

## 2023-08-29 ENCOUNTER — CLINICAL SUPPORT (OUTPATIENT)
Dept: REHABILITATION | Facility: HOSPITAL | Age: 78
End: 2023-08-29
Attending: STUDENT IN AN ORGANIZED HEALTH CARE EDUCATION/TRAINING PROGRAM
Payer: MEDICARE

## 2023-08-29 DIAGNOSIS — R29.898 WEAKNESS OF RIGHT LOWER EXTREMITY: Primary | ICD-10-CM

## 2023-08-29 PROCEDURE — 97110 THERAPEUTIC EXERCISES: CPT | Mod: HCNC,PO | Performed by: PHYSICAL THERAPIST

## 2023-08-29 NOTE — PROGRESS NOTES
"[facility]   Physical Therapy Treatment Note     Name: Srinivasan Bernal III  Clinic Number: [id number]    Therapy Diagnosis:   Encounter Diagnosis   Name Primary?    Weakness of right lower extremity Yes       Physician: Harry Whiteside MD    Visit Date: 8/29/2023  Physician Orders: PT Eval and Treat   Post Surgical? No   Eval and Treat Yes   Type of Therapy Outpatient Therapy   Location: Other (see comments)   Referred to Region: Only select region(s) you would like the patient to be seen in if it is outside of the current encounter's department. [facility] ([place]) Comment - Balance, gait, quad      Medical Diagnosis from Referral: M48.061 (ICD-10-CM) - Spinal stenosis of lumbar region, unspecified whether neurogenic claudication present   Evaluation Date: 7/19/2023  Authorization Period Expiration: 7/22/2024  Plan of Care Expiration:  09/13/2023  Progress Note Due: 08/17/2023, 9/23/2023  Visit # / Visits authorized: 1/ 1, 16/20   FOTO: 2/ 3     Precautions: cancer and CAD FALL RISK, Sudden movements     Time In: 1600  Time Out: 1700  Total Billable Time: 45 minutes     SUBJECTIVE     Pt states: would like to review last note, objectives in detail, and insurance/plan.    He was compliant with home exercise program.  Response to previous treatment: no adverse effects  Functional change: is able to get up steps more easily.     Pain: 0/10  Location: right knee      OBJECTIVE     Objective Measures updated at progress report unless specified.      Treatment     Sergei received the treatments listed below:      therapeutic exercises to develop strength, endurance, ROM, flexibility, posture, and core stabilization for 60 minutes including:  Review of exercises, POC, and objectives from last session as requested by pt. x 30 mins    x 15 mins  TE:  Leg Press 50# 3x15  SINGLE LEG Calf Press 30# 3x10    Not performed:  Stat bike x 5 minutes, L4  Long arc quad 3 x 10, 3" hold with a " "4#  Short arc quad 3 x 10, 3" hold with a 4#  GSS x 2 minutes  SINGLE LEG calf press 2 x 10, 20#  Prone Quad stretch 3x30"   Precor HSC 40# 2 x 10   Hip ABD 35# 2 x 20 -NP  Hip ADD 45 # 2 x 20 -NP  Hip matrix Abduction bilateral 25# 2 x 10-NP  Hip matrix hip flexion bilateral 25# 2x10 (complaints of mild right knee pain) -NP  Hip matrix hip extension bilateral 25# 2x10-NP  Marches with 4# weight 2 x 15  Step ups 4 in step 2x10   Lateral step ups 4 in step 2 x 10   IT band stretch 3x30"  lunge on 8"step x20 5" hold  Stairs with gait belt 3x -NP    neuromuscular re-education activities to improve: Coordination, Kinesthetic, Sense, and Proprioception for 00 minutes. The following activities were included:  SINGLE LEG STANCE 3 x 30" with HHA as needed  Tandem stance 2 x 30" each    Not performed:  SLR with QS 4# 20x   Mini Squat heels on slant board to assist with ankle mobility x30  Knee ex machine 2 up 1 down eccentric 30x 25#  TKE red t-band 30x VCs for quad contraction to achieve TKE    Patient Education and Home Exercises     Home Exercises Provided and Patient Education Provided     Education provided:   -Cont HEP    Written Home Exercises Provided: Patient instructed to cont prior HEP. Exercises were reviewed and Sergei was able to demonstrate them prior to the end of the session.  Sergei demonstrated good  understanding of the education provided. See EMR under Patient Instructions for exercises provided during therapy sessions    ASSESSMENT   Sergei returned reporting no R knee pain at rest but he is to cont with knee brace d/t instability of right knee vs surgery. He is only mildly improved in LE strength and balance. Sergei wanted to review objectives and plan from last session. PT available to answer any questions. Pt wanted very specific details, not lay people details. He will cont current visits and d/c to HEP unless EMG reveals anything significant to which has not been worked on; however, the TKA " instability seems the problem. He can continue strengthening, but weakness is bilateral. Billed according to patient needs and time spent with pt to fulfill those therapy needs.    Sergei Is progressing well towards his goals.   Pt prognosis is Good.     Pt will continue to benefit from skilled outpatient physical therapy to address the deficits listed in the problem list box on initial evaluation, provide pt/family education and to maximize pt's level of independence in the home and community environment.     Pt's spiritual, cultural and educational needs considered and pt agreeable to plan of care and goals.     Anticipated barriers to physical therapy: multiple co-morbidities     Goals: Short Term Goals: 4 weeks   Pt. Will report he is able to walk up stairs more easily with a handrail for increased ability to go out in the community.    -MET, 8/24/2023  Pt. Will improve bilateral lower extremity to 4-/5 for improved ambulatory endurance.    -ongoing  Pt. Will be able to do single leg stance bilaterally for 15 seconds with no HHA, to decrease likelihood of falls.    -ongoing     Long Term Goals: 8 weeks   Pt. Will improve bilateral lower extremity to 4+/5  for improved ambulatory endurance.   Pt. Will be able to do single leg stance bilaterally for 30 seconds with no HHA, to decrease likelihood of falls    PLAN     Plan of care Certification: 7/19/2023 to 09/13/2023.     Outpatient Physical Therapy 1-2 times weekly for 8 weeks to include the following interventions: Cervical/Lumbar Traction, Gait Training, Manual Therapy, Neuromuscular Re-ed, Patient Education, Self Care, Therapeutic Activities, and Therapeutic Exercise    Rocio De La Cruz, PT

## 2023-09-01 ENCOUNTER — DOCUMENTATION ONLY (OUTPATIENT)
Dept: REHABILITATION | Facility: HOSPITAL | Age: 78
End: 2023-09-01

## 2023-09-01 ENCOUNTER — CLINICAL SUPPORT (OUTPATIENT)
Dept: REHABILITATION | Facility: HOSPITAL | Age: 78
End: 2023-09-01
Attending: STUDENT IN AN ORGANIZED HEALTH CARE EDUCATION/TRAINING PROGRAM
Payer: MEDICARE

## 2023-09-01 DIAGNOSIS — R29.898 WEAKNESS OF RIGHT LOWER EXTREMITY: Primary | ICD-10-CM

## 2023-09-01 PROCEDURE — 97110 THERAPEUTIC EXERCISES: CPT | Mod: HCNC,PO,CQ

## 2023-09-01 PROCEDURE — 97112 NEUROMUSCULAR REEDUCATION: CPT | Mod: HCNC,PO,CQ

## 2023-09-01 NOTE — PROGRESS NOTES
"[facility]   Physical Therapy Treatment Note     Name: Srinivasan Bernal III  Clinic Number: [id number]    Therapy Diagnosis:   Encounter Diagnosis   Name Primary?    Weakness of right lower extremity Yes       Physician: Harry Whiteside MD    Visit Date: 9/1/2023  Physician Orders: PT Eval and Treat   Post Surgical? No   Eval and Treat Yes   Type of Therapy Outpatient Therapy   Location: Other (see comments)   Referred to Region: Only select region(s) you would like the patient to be seen in if it is outside of the current encounter's department. [facility] ([place]) Comment - Balance, gait, quad      Medical Diagnosis from Referral: M48.061 (ICD-10-CM) - Spinal stenosis of lumbar region, unspecified whether neurogenic claudication present   Evaluation Date: 7/19/2023  Authorization Period Expiration: 7/22/2024  Plan of Care Expiration:  09/13/2023  Progress Note Due: 08/17/2023, 9/23/2023  Visit # / Visits authorized: 1/ 1, 17/20   FOTO: 2/ 3     Precautions: cancer and CAD FALL RISK, Sudden movements     Time In: 2:15  Time Out: 3:15  Total Billable Time: 60 minutes     SUBJECTIVE     Pt states: would like to review use of theraband for use with  Hep exs. He denies complaints today. He feels that the knee brace is helping.    He was compliant with home exercise program.  Response to previous treatment: no adverse effects  Functional change: is able to get up steps more easily.     Pain: 0/10  Location: right knee      OBJECTIVE     Objective Measures updated at progress report unless specified.      Treatment     Sergei received the treatments listed below:      therapeutic exercises to develop strength, endurance, ROM, flexibility, posture, and core stabilization for 50 minutes including:      TE:  Leg Press 50# 3x15  Stat bike x 5 minutes, L4  Long arc quad 3 x 10, 3" hold with a 4#  Short arc quad 3 x 10, 3" hold with a 4#  GSS x 2 minutes  SINGLE LEG calf press 2 x 10, " "20#  Prone Quad stretch 3x30"   Precor HSC 40# 2 x 10   Hip ABD 35# 2 x 20 -NP  Hip ADD 45 # 2 x 20 -NP  Hip matrix Abduction bilateral 25# 2 x 10-  Hip matrix hip flexion bilateral 25# 2x10  Hip matrix hip extension bilateral 25# 2x10  Marches with 4# weight 2 x 15  Step ups 6 in step 2x10   Lateral step ups 4 in step 2 x 10 NP  IT band stretch 3x30"NP  lunge on 8"step x20 5" hold NP  Stairs with gait belt 3x -NP    neuromuscular re-education activities to improve: Coordination, Kinesthetic, Sense, and Proprioception for 10 minutes. The following activities were included:  SINGLE LEG STANCE 3 x 30" with HHA as needed  Tandem stance 2 x 30" each    Not performed:  SLR with QS 4# 20x   Mini Squat heels on slant board to assist with ankle mobility x30  Knee ex machine 2 up 1 down eccentric 30x 25#  TKE red t-band 30x VCs for quad contraction to achieve TKE    Patient Education and Home Exercises     Home Exercises Provided and Patient Education Provided     Education provided:   -Cont HEP    Written Home Exercises Provided: Patient instructed to cont prior HEP. Exercises were reviewed and Sergei was able to demonstrate them prior to the end of the session.  Sergei demonstrated good  understanding of the education provided. See EMR under Patient Instructions for exercises provided during therapy sessions    ASSESSMENT   Sergei raegan today's tx with progression of ther ex and neuromuscular re-ed well. He was able to progress to 6 inch step ups w/o difficulty or complaint and was pleased with his progress. Reviewed use of theraband with HEP exs, modified GSS and HSS for home. He will cont current visits and d/c to HEP unless EMG reveals anything significant to which has not been worked on; however, the TKA instability seems the problem. He can continue strengthening, but weakness is bilateral.    Sergei Is progressing well towards his goals.   Pt prognosis is Good.     Pt will continue to benefit from skilled outpatient " physical therapy to address the deficits listed in the problem list box on initial evaluation, provide pt/family education and to maximize pt's level of independence in the home and community environment.     Pt's spiritual, cultural and educational needs considered and pt agreeable to plan of care and goals.     Anticipated barriers to physical therapy: multiple co-morbidities     Goals: Short Term Goals: 4 weeks   Pt. Will report he is able to walk up stairs more easily with a handrail for increased ability to go out in the community.    -MET, 8/24/2023  Pt. Will improve bilateral lower extremity to 4-/5 for improved ambulatory endurance.    -ongoing  Pt. Will be able to do single leg stance bilaterally for 15 seconds with no HHA, to decrease likelihood of falls.    -ongoing     Long Term Goals: 8 weeks   Pt. Will improve bilateral lower extremity to 4+/5  for improved ambulatory endurance.   Pt. Will be able to do single leg stance bilaterally for 30 seconds with no HHA, to decrease likelihood of falls    PLAN     Plan of care Certification: 7/19/2023 to 09/13/2023.     Outpatient Physical Therapy 1-2 times weekly for 8 weeks to include the following interventions: Cervical/Lumbar Traction, Gait Training, Manual Therapy, Neuromuscular Re-ed, Patient Education, Self Care, Therapeutic Activities, and Therapeutic Exercise    Immanuel Tolbert, PTA

## 2023-09-06 ENCOUNTER — CLINICAL SUPPORT (OUTPATIENT)
Dept: REHABILITATION | Facility: HOSPITAL | Age: 78
End: 2023-09-06
Attending: STUDENT IN AN ORGANIZED HEALTH CARE EDUCATION/TRAINING PROGRAM
Payer: MEDICARE

## 2023-09-06 DIAGNOSIS — R29.898 WEAKNESS OF RIGHT LOWER EXTREMITY: Primary | ICD-10-CM

## 2023-09-06 PROCEDURE — 97110 THERAPEUTIC EXERCISES: CPT | Mod: HCNC,PO | Performed by: PHYSICAL THERAPIST

## 2023-09-06 NOTE — PROGRESS NOTES
[facility]   Physical Therapy Treatment Note     Name: Srinivasan Bernal III  Clinic Number: [id number]    Therapy Diagnosis:   Encounter Diagnosis   Name Primary?    Weakness of right lower extremity Yes       Physician: Harry Whiteside MD    Visit Date: 9/6/2023  Physician Orders: PT Eval and Treat   Post Surgical? No   Eval and Treat Yes   Type of Therapy Outpatient Therapy   Location: Other (see comments)   Referred to Region: Only select region(s) you would like the patient to be seen in if it is outside of the current encounter's department. [facility] ([place]) Comment - Balance, gait, quad      Medical Diagnosis from Referral: M48.061 (ICD-10-CM) - Spinal stenosis of lumbar region, unspecified whether neurogenic claudication present   Evaluation Date: 7/19/2023  Authorization Period Expiration: 7/22/2024  Plan of Care Expiration:  09/13/2023  Progress Note Due: 08/17/2023, 9/23/2023  Visit # / Visits authorized: 1/ 1, 18/20   FOTO: 2/ 3     Precautions: cancer and CAD FALL RISK, Sudden movements     Time In: 1000  Time Out: 1057  Total Billable Time: 54 minutes     SUBJECTIVE     Pt states: he would like to review his objectives again and take pictures of his performing ex.    He was compliant with home exercise program.  Response to previous treatment: no adverse effects  Functional change: is able to get up steps more easily.     Pain: 0/10  Location: right knee      OBJECTIVE     Objective Measures updated at progress report unless specified.      Treatment     Sergei received the treatments listed below:      therapeutic exercises to develop strength, endurance, ROM, flexibility, posture, and core stabilization for 60 minutes including:    TE:  Leg Press 50# 3x15  Precor HSC 40# 3 x 10   SINGLE LEG calf press 2 x 10, 20#  Leg extension precor 25# 3 x 10  Hip matrix Abduction bilateral 40# 2 x 10  Hip matrix adduction 40# 2 x 10  Hip matrix hip flexion bilateral 40#  2x10  Hip matrix hip extension bilateral 40# 2x10  HEP review, see handout    Patient Education and Home Exercises     Home Exercises Provided and Patient Education Provided     Education provided:   - Cont HEP  - + HEP, blue and green band given    Written Home Exercises Provided: yes. Exercises were reviewed and Sergei was able to demonstrate them prior to the end of the session.  Sergei demonstrated good  understanding of the education provided. See EMR under Patient Instructions for exercises provided during therapy sessions.    ASSESSMENT   Sergei is performing comprehensive wellness level HEP well. He was given a handout this visit and performed this under skilled observation and direction in preparation for reassessment and discharge next visit. 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 Cont brace as needed and as directed by doctor. Pt will cont HEP to preserve Medicare benefits and strength may be slow progress or plateau. EMG study likely to reveal weakness as we have been treating.    Sergei Is progressing well towards his goals.   Pt prognosis is Good.     Pt will continue to benefit from skilled outpatient physical therapy to address the deficits listed in the problem list box on initial evaluation, provide pt/family education and to maximize pt's level of independence in the home and community environment.     Pt's spiritual, cultural and educational needs considered and pt agreeable to plan of care and goals.     Anticipated barriers to physical therapy: multiple co-morbidities     Goals: Short Term Goals: 4 weeks   Pt. Will report he is able to walk up stairs more easily with a handrail for increased ability to go out in the community.    -MET, 8/24/2023  Pt. Will improve bilateral lower extremity to 4-/5 for improved ambulatory endurance.    -ongoing  Pt. Will be able to do single leg stance bilaterally for 15 seconds with no HHA, to decrease likelihood of falls.    -ongoing     Long Term  Goals: 8 weeks   Pt. Will improve bilateral lower extremity to 4+/5  for improved ambulatory endurance.   Pt. Will be able to do single leg stance bilaterally for 30 seconds with no HHA, to decrease likelihood of falls    PLAN     Plan of care Certification: 7/19/2023 to 09/13/2023.     Outpatient Physical Therapy 1-2 times weekly for 8 weeks to include the following interventions: Cervical/Lumbar Traction, Gait Training, Manual Therapy, Neuromuscular Re-ed, Patient Education, Self Care, Therapeutic Activities, and Therapeutic Exercise    Rocio De La Cruz, PT

## 2023-09-12 ENCOUNTER — PATIENT MESSAGE (OUTPATIENT)
Dept: PAIN MEDICINE | Facility: CLINIC | Age: 78
End: 2023-09-12
Payer: MEDICARE

## 2023-09-12 ENCOUNTER — CLINICAL SUPPORT (OUTPATIENT)
Dept: REHABILITATION | Facility: HOSPITAL | Age: 78
End: 2023-09-12
Attending: STUDENT IN AN ORGANIZED HEALTH CARE EDUCATION/TRAINING PROGRAM
Payer: MEDICARE

## 2023-09-12 DIAGNOSIS — R29.898 WEAKNESS OF RIGHT LOWER EXTREMITY: Primary | ICD-10-CM

## 2023-09-12 PROCEDURE — 97110 THERAPEUTIC EXERCISES: CPT | Mod: HCNC,PO | Performed by: PHYSICAL THERAPIST

## 2023-09-12 NOTE — PROGRESS NOTES
[facility]  PT Discharge Note    Name: Srinivasan Sergei Ball III  Clinic Number: [id number]    Therapy Diagnosis:   Encounter Diagnosis   Name Primary?    Weakness of right lower extremity Yes     Physician: Kaelyn Williamson PA-C    Physician Orders: PT Eval and Treat   Post Surgical? No   Eval and Treat Yes   Type of Therapy Outpatient Therapy   Location: Other (see comments)   Referred to Region: Only select region(s) you would like the patient to be seen in if it is outside of the current encounter's department. [facility] ([facility]) Comment - Balance, gait, quad      Medical Diagnosis from Referral: M48.061 (ICD-10-CM) - Spinal stenosis of lumbar region, unspecified whether neurogenic claudication present   Evaluation Date: 7/19/2023    Date of Last visit: 9/12/2023  Total Visits Received: 19    No shows: 2  Call-cancels: 1    ASSESSMENT      Pt reports that his EMG study shows denervation AND reinnervation. He is ready to be discharged and continue home program/maintenance after 19 visits.    Discharge reason: Patient has reached the maximum rehab potential for the present time    Discharge FOTO Score: na    Goals: see below    PLAN   This patient is discharged from Physical Therapy.      Rocio De La Cruz, PT      [facility] OUTPATIENT THERAPY AND WELLNESS   Physical Therapy Treatment Note     Name: Srinivasan Sergei Ball III  Clinic Number: [id number]    Therapy Diagnosis:   Encounter Diagnosis   Name Primary?    Weakness of right lower extremity Yes       Physician: Kaelyn Williamson PA-C    Visit Date: 9/12/2023  Physician Orders: PT Eval and Treat   Post Surgical? No   Eval and Treat Yes   Type of Therapy Outpatient Therapy   Location: Other (see comments)   Referred to Region: Only select region(s) you would like the patient to be seen in if it is outside of the current encounter's department. [facility] ([place]) Comment - Balance, gait, quad      Medical Diagnosis from  Referral: M48.061 (ICD-10-CM) - Spinal stenosis of lumbar region, unspecified whether neurogenic claudication present   Evaluation Date: 7/19/2023  Authorization Period Expiration: 7/22/2024  Plan of Care Expiration:  09/13/2023  Progress Note Due: 08/17/2023, 9/23/2023  Visit # / Visits authorized: 1/ 1, 19/20   FOTO: 2/ 3     Precautions: cancer and CAD FALL RISK, Sudden movements     Time In: 1108  Time Out: 1200  Total Billable Time: 45 minutes     SUBJECTIVE     Pt states: he has self notes from his home program that he would like to review. The machines at his facility are different and he will work with that facility to determine appropriate resistance to their machines comparable to the machines here at [facility].    He was compliant with home exercise program.  Response to previous treatment: no adverse effects  Functional change: is able to get up steps more easily.     Pain: 0/10  Location: right knee      OBJECTIVE     Objective Measures updated at progress report unless specified.      Treatment     Sergei received the treatments listed below:      therapeutic exercises to develop strength, endurance, ROM, flexibility, posture, and core stabilization for 45 minutes including:    TE:  Leg Press 50# 3x15  Precor HSC 40# 3 x 10   SINGLE LEG calf press 2 x 10, 20#  Leg extension precor 25# 3 x 10  Standing hip Abduction, extension, flexion gtb 3 x 10  HEP review, see handout, final questions, and band given.    Patient Education and Home Exercises     Home Exercises Provided and Patient Education Provided     Education provided:   - Cont HEP  - blue, green, and red band given    Written Home Exercises Provided: Patient instructed to cont prior HEP. Exercises were reviewed and Sergei was able to demonstrate them prior to the end of the session.  Sergei demonstrated good  understanding of the education provided. See EMR under Patient Instructions for exercises provided during therapy sessions.    ASSESSMENT      Sergei is performing comprehensive wellness level HEP well. He was given a handout last visit and performed this under skilled observation and direction in preparation for discharge. Writing his own notes helps him. 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 Cont brace as needed and as directed by doctor as it continues considerably loose and not progressing steadily. Pt will cont HEP to preserve Medicare benefits and strength may be slow progress or plateau. He is aware of the importance to cont HEP especially with the EMG results. This may just be slow given the nature of nerve reinnervation.    Goals: Short Term Goals: 4 weeks   Pt. Will report he is able to walk up stairs more easily with a handrail for increased ability to go out in the community.    -MET, 8/24/2023  Pt. Will improve bilateral lower extremity to 4-/5 for improved ambulatory endurance.    -MET, 9/12/2023.  Pt. Will be able to do single leg stance bilaterally for 15 seconds with no HHA, to decrease likelihood of falls.    -NOT MET     Long Term Goals: 8 weeks   Pt. Will improve bilateral lower extremity to 4+/5  for improved ambulatory endurance.   Lower Extremity Strength  Right LE   Left LE     Knee extension: 4-/5 Knee extension: 4/5   Knee flexion: 4-/5 Knee flexion: 4/5   Hip flexion: 3/5 Hip flexion: 3/5   Hip extension:  3/5 Hip extension: 3+/5   Hip abduction: 4/5 Hip abduction: 4/5   Hip adduction: 4/5 Hip adduction 4/5   Ankle dorsiflexion: 4/5 Ankle dorsiflexion: 5/5   Ankle plantarflexion: 4/5 Ankle plantarflexion: 5/5   -NOT MET     Pt. Will be able to do single leg stance bilaterally for 30 seconds with no HHA, to decrease likelihood of falls  -NOT MET       PLAN     Discharge to home program.    Rocio De La Cruz, PT

## 2023-09-13 PROBLEM — M54.50 LOW BACK PAIN: Status: RESOLVED | Noted: 2023-05-03 | Resolved: 2023-09-13

## 2023-09-13 NOTE — PLAN OF CARE
[facility]  PT Discharge Note     Name: Srinivasan Sergei Ball III  Clinic Number: [id number]     Therapy Diagnosis:        Encounter Diagnosis   Name Primary?    Weakness of right lower extremity Yes      Physician: Kaelyn Williamson PA-C     Physician Orders: PT Eval and Treat   Post Surgical? No   Eval and Treat Yes   Type of Therapy Outpatient Therapy   Location: Other (see comments)   Referred to Region: Only select region(s) you would like the patient to be seen in if it is outside of the current encounter's department. [facility] ([facility]) Comment - Balance, gait, quad      Medical Diagnosis from Referral: M48.061 (ICD-10-CM) - Spinal stenosis of lumbar region, unspecified whether neurogenic claudication present   Evaluation Date: 7/19/2023     Date of Last visit: 9/12/2023  Total Visits Received: 19     No shows: 2  Call-cancels: 1     ASSESSMENT       Pt reports that his EMG study shows denervation AND reinnervation. He is ready to be discharged and continue home program/maintenance after 19 visits.     Discharge reason: Patient has reached the maximum rehab potential for the present time     Discharge FOTO Score: na     Goals: see below     PLAN   This patient is discharged from Physical Therapy.        Rocio De La Cruz, PT       [facility] OUTPATIENT THERAPY AND [facility]LLNESS   Physical Therapy Treatment Note      Name: Srinivasan Sergei Ball III  Clinic Number: [id number]     Therapy Diagnosis:        Encounter Diagnosis   Name Primary?    Weakness of right lower extremity Yes         Physician: Kaelyn Williamson PA-C     Visit Date: 9/12/2023  Physician Orders: PT Eval and Treat   Post Surgical? No   Eval and Treat Yes   Type of Therapy Outpatient Therapy   Location: Other (see comments)   Referred to Region: Only select region(s) you would like the patient to be seen in if it is outside of the current encounter's department. [facility] ([place]) Comment - Balance, gait, quad       Medical Diagnosis from Referral: M48.061 (ICD-10-CM) - Spinal stenosis of lumbar region, unspecified whether neurogenic claudication present   Evaluation Date: 7/19/2023  Authorization Period Expiration: 7/22/2024  Plan of Care Expiration:  09/13/2023  Progress Note Due: 08/17/2023, 9/23/2023  Visit # / Visits authorized: 1/ 1, 19/20   FOTO: 2/ 3     Precautions: cancer and CAD FALL RISK, Sudden movements     Time In: 1108  Time Out: 1200  Total Billable Time: 45 minutes      SUBJECTIVE      Pt states: he has self notes from his home program that he would like to review. The machines at his facility are different and he will work with that facility to determine appropriate resistance to their machines comparable to the machines here at [facility].     He was compliant with home exercise program.  Response to previous treatment: no adverse effects  Functional change: is able to get up steps more easily.      Pain: 0/10  Location: right knee       OBJECTIVE      Objective Measures updated at progress report unless specified.      Treatment      Sergei received the treatments listed below:       therapeutic exercises to develop strength, endurance, ROM, flexibility, posture, and core stabilization for 45 minutes including:     TE:  Leg Press 50# 3x15  Precor HSC 40# 3 x 10   SINGLE LEG calf press 2 x 10, 20#  Leg extension precor 25# 3 x 10  Standing hip Abduction, extension, flexion gtb 3 x 10  HEP review, see handout, final questions, and band given.     Patient Education and Home Exercises      Home Exercises Provided and Patient Education Provided      Education provided:   - Cont HEP  - blue, green, and red band given     Written Home Exercises Provided: Patient instructed to cont prior HEP. Exercises were reviewed and Sergei was able to demonstrate them prior to the end of the session.  Sergei demonstrated good  understanding of the education provided. See EMR under Patient Instructions for exercises provided  during therapy sessions.     ASSESSMENT      Sergei is performing comprehensive wellness level HEP well. He was given a handout last visit and performed this under skilled observation and direction in preparation for discharge. Writing his own notes helps him. 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 Cont brace as needed and as directed by doctor as it continues considerably loose and not progressing steadily. Pt will cont HEP to preserve Medicare benefits and strength may be slow progress or plateau. He is aware of the importance to cont HEP especially with the EMG results. This may just be slow given the nature of nerve reinnervation.     Goals: Short Term Goals: 4 weeks   Pt. Will report he is able to walk up stairs more easily with a handrail for increased ability to go out in the community.               -MET, 8/24/2023  Pt. Will improve bilateral lower extremity to 4-/5 for improved ambulatory endurance.               -MET, 9/12/2023.  Pt. Will be able to do single leg stance bilaterally for 15 seconds with no HHA, to decrease likelihood of falls.               -NOT MET     Long Term Goals: 8 weeks   Pt. Will improve bilateral lower extremity to 4+/5  for improved ambulatory endurance.   Lower Extremity Strength  Right LE   Left LE     Knee extension: 4-/5 Knee extension: 4/5   Knee flexion: 4-/5 Knee flexion: 4/5   Hip flexion: 3/5 Hip flexion: 3/5   Hip extension:  3/5 Hip extension: 3+/5   Hip abduction: 4/5 Hip abduction: 4/5   Hip adduction: 4/5 Hip adduction 4/5   Ankle dorsiflexion: 4/5 Ankle dorsiflexion: 5/5   Ankle plantarflexion: 4/5 Ankle plantarflexion: 5/5   -NOT MET     Pt. Will be able to do single leg stance bilaterally for 30 seconds with no HHA, to decrease likelihood of falls  -NOT MET        PLAN      Discharge to home program.     Rocio De La Cruz, PT

## 2023-09-14 ENCOUNTER — TELEPHONE (OUTPATIENT)
Dept: PAIN MEDICINE | Facility: CLINIC | Age: 78
End: 2023-09-14
Payer: MEDICARE

## 2023-09-14 NOTE — TELEPHONE ENCOUNTER
----- Message from Mayra Vargas sent at 9/12/2023 12:07 PM CDT -----  Patient is req call back to give you the info so you can get the test results that he had done outside of [facility] .  Call back at [phone] and thanks

## 2023-09-14 NOTE — TELEPHONE ENCOUNTER
I spoke with Mr. Bernal and he has been scheduled to see Héctor Cade 10-4-23 to review the results of the EMG done by Dr. Palomares, he indicated understanding.

## 2023-09-14 NOTE — TELEPHONE ENCOUNTER
Spoke with patient. He stated the EMG results from Dr. Palomares's office needed to be sent. Call Dr. Palomares's office and gave the correct fax number. They are faxing results now.

## 2023-10-04 ENCOUNTER — OFFICE VISIT (OUTPATIENT)
Dept: PAIN MEDICINE | Facility: CLINIC | Age: 78
End: 2023-10-04
Payer: MEDICARE

## 2023-10-04 ENCOUNTER — TELEPHONE (OUTPATIENT)
Dept: ORTHOPEDICS | Facility: CLINIC | Age: 78
End: 2023-10-04
Payer: MEDICARE

## 2023-10-04 VITALS
SYSTOLIC BLOOD PRESSURE: 111 MMHG | DIASTOLIC BLOOD PRESSURE: 70 MMHG | HEART RATE: 71 BPM | BODY MASS INDEX: 21.38 KG/M2 | HEIGHT: 74 IN | TEMPERATURE: 99 F | WEIGHT: 166.63 LBS

## 2023-10-04 DIAGNOSIS — M43.10 SPONDYLOLISTHESIS, UNSPECIFIED SPINAL REGION: ICD-10-CM

## 2023-10-04 DIAGNOSIS — R29.898 RIGHT LEG WEAKNESS: ICD-10-CM

## 2023-10-04 DIAGNOSIS — G89.29 CHRONIC FOOT PAIN, LEFT: ICD-10-CM

## 2023-10-04 DIAGNOSIS — M79.672 CHRONIC FOOT PAIN, LEFT: ICD-10-CM

## 2023-10-04 DIAGNOSIS — M48.061 SPINAL STENOSIS OF LUMBAR REGION, UNSPECIFIED WHETHER NEUROGENIC CLAUDICATION PRESENT: Primary | ICD-10-CM

## 2023-10-04 PROCEDURE — 99999 PR PBB SHADOW E&M-EST. PATIENT-LVL III: CPT | Mod: PBBFAC,HCNC,,

## 2023-10-04 PROCEDURE — 1101F PR PT FALLS ASSESS DOC 0-1 FALLS W/OUT INJ PAST YR: ICD-10-PCS | Mod: HCNC,CPTII,S$GLB,

## 2023-10-04 PROCEDURE — 99214 OFFICE O/P EST MOD 30 MIN: CPT | Mod: HCNC,S$GLB,,

## 2023-10-04 PROCEDURE — 3288F FALL RISK ASSESSMENT DOCD: CPT | Mod: HCNC,CPTII,S$GLB,

## 2023-10-04 PROCEDURE — 3078F DIAST BP <80 MM HG: CPT | Mod: HCNC,CPTII,S$GLB,

## 2023-10-04 PROCEDURE — 99214 PR OFFICE/OUTPT VISIT, EST, LEVL IV, 30-39 MIN: ICD-10-PCS | Mod: HCNC,S$GLB,,

## 2023-10-04 PROCEDURE — 3078F PR MOST RECENT DIASTOLIC BLOOD PRESSURE < 80 MM HG: ICD-10-PCS | Mod: HCNC,CPTII,S$GLB,

## 2023-10-04 PROCEDURE — 3074F PR MOST RECENT SYSTOLIC BLOOD PRESSURE < 130 MM HG: ICD-10-PCS | Mod: HCNC,CPTII,S$GLB,

## 2023-10-04 PROCEDURE — 3074F SYST BP LT 130 MM HG: CPT | Mod: HCNC,CPTII,S$GLB,

## 2023-10-04 PROCEDURE — 1159F PR MEDICATION LIST DOCUMENTED IN MEDICAL RECORD: ICD-10-PCS | Mod: HCNC,CPTII,S$GLB,

## 2023-10-04 PROCEDURE — 1126F AMNT PAIN NOTED NONE PRSNT: CPT | Mod: HCNC,CPTII,S$GLB,

## 2023-10-04 PROCEDURE — 3288F PR FALLS RISK ASSESSMENT DOCUMENTED: ICD-10-PCS | Mod: HCNC,CPTII,S$GLB,

## 2023-10-04 PROCEDURE — 99999 PR PBB SHADOW E&M-EST. PATIENT-LVL III: ICD-10-PCS | Mod: PBBFAC,HCNC,,

## 2023-10-04 PROCEDURE — 1101F PT FALLS ASSESS-DOCD LE1/YR: CPT | Mod: HCNC,CPTII,S$GLB,

## 2023-10-04 PROCEDURE — 1159F MED LIST DOCD IN RCRD: CPT | Mod: HCNC,CPTII,S$GLB,

## 2023-10-04 PROCEDURE — 1126F PR PAIN SEVERITY QUANTIFIED, NO PAIN PRESENT: ICD-10-PCS | Mod: HCNC,CPTII,S$GLB,

## 2023-10-04 NOTE — PROGRESS NOTES
[facility]      PCP:   Ray Anna MD    CC:   Chief Complaint   Patient presents with    Knee Pain          10/4/2023    11:04 AM   Last 3 PDI Scores   Pain Disability Index (PDI) 0       Interval HPI 10/4/2023: Srinivasan Bernal III returns to the office for follow up.  He returns for EMG review.  EMG shows a subacute chronic L5/S1 lumbar radiculopathy with some potential denervation and reinnervation over the bilateral knees.  Overall, he denies any significant lower back pain nor any pain radiating down his legs.  He continues to have some weakness with certain movements such as climbing steps and lateral movements when playing pickleball in his right leg.  He has been maintaining his physical activities and PT directed exercises with improvement in his strength.  He reports some numbness in the bottom of his left foot otherwise denies any other numbness, any other weakness or any new changes to his bowel bladder function.  He is also reporting chronic left foot pain.  It is has gradually worsened ever since having left ankle fusion many years ago.      HPI:     Mr. Mai returns for follow up of back pain after PT.  Last seen right lower back and buttock pain that increased with walking and improved with sitting.  He compelded medrol taper and has been working with PT.  While PT has helped improve, but not resolve right lower back pain, he has developed weakness in the right quadriceps.  PT contacted me 5/29/23 about acute onset weakness in the right quadriceps.  Mr. Mai has noticed diffculty stepping up onto a curb because of the weakness.    He has some history of right knee welling since a replacement; he had an xray of the knee and is scheduled to see orthopedics soon for further assessment.     Initial HPI:  Srinivasan Bernal III is a 78 y.o. male with history of CAD presents with lower back pain.  He has had similar pain in 2007 and 2011 with benefit from PT in the past.  Current  pain started 3/22/23 without triggering event.  He has pain in the right lower lumbar region/ buttock.  No radicular leg pain, numbness or tignling.  Pain is worse with walking and improves with sitting.  No pain with laying down.  Has tried ibuprofen.    He had MRI and xray in the past showing right L5 pars deffect and anterolisthesis of L5 on S1.      Past and current medications:  Antineuropathics:  NSAIDs:  ibuprofen  Antidepressants:  Muscle relaxers:  Opioids:  Antiplatelets/Anticoagulants:  ASA  Others: completed medrol taper    Physical therapy/ Chiropractic care:  PT in the past and currently going with benefit    Pain Intervention History:  none    Past Spine Surgical History:  none      History:    Current Outpatient Medications:     aspirin (ECOTRIN) 81 MG EC tablet, Take 81 mg by mouth once daily., Disp: , Rfl:     atorvastatin (LIPITOR) 80 MG tablet, TAKE 1 TABLET ONE TIME DAILY, Disp: 90 tablet, Rfl: 1    ezetimibe (ZETIA) 10 mg tablet, TAKE 1 TABLET ONE TIME DAILY, Disp: 90 tablet, Rfl: 1    fluticasone propionate (FLONASE) 50 mcg/actuation nasal spray, 2 sprays (100 mcg total) by Each Nostril route once daily., Disp: 16 g, Rfl: 11    ibuprofen (ADVIL,MOTRIN) 200 MG tablet, Take 200 mg by mouth every 6 (six) hours as needed for Pain., Disp: , Rfl:     perindopril erbumine (ACEON) 4 mg tablet, TAKE 1 TABLET ONE TIME DAILY, Disp: 90 tablet, Rfl: 2    ticagrelor (BRILINTA) 90 mg tablet, Take 1 tablet twice a day by oral route for 90 days., Disp: , Rfl:     methocarbamoL (ROBAXIN) 750 MG Tab, Take 1 tablet (750 mg total) by mouth 4 (four) times daily as needed. (Patient not taking: Reported on 8/10/2023), Disp: 44 tablet, Rfl: 3    tadalafiL (CIALIS) 20 MG Tab, Take 1 tablet (20 mg total) by mouth once daily., Disp: 30 tablet, Rfl: 11    Past Medical History:   Diagnosis Date    Cataracts, bilateral     2009, 2013    Coronary artery disease 2004    Hypercholesteremia     Kidney stone 2015     Urerteroscopy    Skin cancer 2015    left ankle/ left ear       Past Surgical History:   Procedure Laterality Date    ANKLE FUSION      CIRCUMCISION      COLONOSCOPY N/A 09/20/2022    Procedure: COLONOSCOPY;  Surgeon: Natasha Sommer MD;  Location: [facility] END[facility];  Service: Endoscopy;  Laterality: N/A;    FOOT SURGERY      HERNIA REPAIR Left 2008    KNEE ARTHROSCOPY Left 2010    KNEE ARTHROSCOPY Right 2010    KNEE SURGERY Left 2010    partial replacement    KNEE SURGERY Right 2012    replacement    MOUTH SURGERY      STENT, DRUG ELUTING, SINGLE VESSEL, CORONARY      TOE SURGERY      TONSILLECTOMY         Family History   Problem Relation Age of Onset    Heart disease Father     Dementia Mother        Social History     Socioeconomic History    Marital status:    Tobacco Use    Smoking status: Some Days     Types: Cigars    Smokeless tobacco: Never   Substance and Sexual Activity    Alcohol use: Yes     Comment: rarely    Drug use: No    Sexual activity: Yes     Social Determinants of Health     Financial Resource Strain: Low Risk  (8/9/2023)    Overall Financial Resource Strain (CARDIA)     Difficulty of Paying Living Expenses: Not hard at all   Food Insecurity: No Food Insecurity (8/9/2023)    Hunger Vital Sign     Worried About Running Out of Food in the Last Year: Never true     Ran Out of Food in the Last Year: Never true   Transportation Needs: No Transportation Needs (8/9/2023)    PRAPARE - Transportation     Lack of Transportation (Medical): No     Lack of Transportation (Non-Medical): No   Physical Activity: Sufficiently Active (8/9/2023)    Exercise Vital Sign     Days of Exercise per Week: 7 days     Minutes of Exercise per Session: 40 min   Stress: No Stress Concern Present (8/9/2023)    [employer] [place] [employer] - Occupational Stress Questionnaire     Feeling of Stress : Not at all   Social Connections: Unknown (8/9/2023)    Social Connection and Isolation Panel [NHANES]     Frequency  "of Communication with Friends and Family: More than three times a week     Frequency of Social Gatherings with Friends and Family: More than three times a week     Active Member of Clubs or Organizations: Yes     Attends Club or Organization Meetings: More than 4 times per year     Marital Status:    Housing Stability: Low Risk  (8/9/2023)    Housing Stability Vital Sign     Unable to Pay for Housing in the Last Year: No     Number of Places Lived in the Last Year: 1     Unstable Housing in the Last Year: No       Review of patient's allergies indicates:  No Known Allergies    Review of Systems:  Right low back/ buttock pain.  Balance of review of systems is negative.    Physical Exam:  Vitals:    10/04/23 1104   BP: 111/70   Pulse: 71   Temp: 98.9 °F (37.2 °C)   Weight: 75.6 kg (166 lb 10 oz)   Height: 6' 1.83" (1.875 m)   PainSc: 0-No pain   PainLoc: Knee       Body mass index is 21.49 kg/m².    Gen: NAD  Psych: mood appropriate for given condition  HEENT: eyes anicteric   CV: RRR, 2+ radial pulse  HEENT: anicteric   Respiratory: non-labored, no signs of respiratory distress  Abd: non-distended  Skin: warm, dry and intact.  Gait: No antalgic gait.         Lumbar spine:   ROM is full with flexion extension and oblique extension with no increased pain.    Myofascial exam: No tenderness to palpation across lumbar paraspinous muscles.     Sensory:   Intact and symmetrical to light touch in L1-S1 dermatomes bilaterally.    Motor:       Right Left   L2/3 Iliacus Hip flexion  5  5   L3/4 Qudratus Femoris Knee Extension  5  5   L4/5 Tib Anterior Ankle Dorsiflexion   5  5   L5/S1 Extensor Hallicus Longus Great toe extension  5  5   S1/S2 Gastroc/Soleus Plantar Flexion  5  5      Right Left   Triceps DTR     Biceps DTR     Brachioradialis DTR     Patellar DTR 1+ 1+   Achilles DTR 0+ 0+   Armirez     Clonus     Babinski       Imaging:    Xray lumbar spine report 11-:  multilevel lumbar sponylosis with subtle " "degenerative retrolisthesis of L1 on L2 and atnerolisthesis of L4 on L5.  Chronic compression deformity invlolving L1 superior endplate with very minimal loss of stature.    MRI lumbar spine report 5-:  multilevel spondylosis in the lumbar spine.  Severe facet degenerative arthropathy bilaterally at L4/5.  Severe DDD at L5/S1 and Grade 1 anterolisthesis of L5 on S1.  Right pars defect of L5 and possible left sided pars defect. No disc herniation at any level.    Xray knees 6-7-23:  The bones are osteopenic.  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  There is nonspecific lucency present beneath the distal right femoral component, and component loosening/infection cannot be excluded.  The left knee medial tibiofemoral hardware is anatomically aligned without complicating feature appreciated radiographically.  There is a moderate left knee joint effusion and/or synovial hypertrophy.  There is subcortical cyst formation observed no fracture, dislocation, or osseous destructive process appreciated radiographically.  There is extensive capsular calcification noted along the posterior margin of the right knee joint.  There is a 9.4 cm calcified density present overlying the left suprapatellar recess.  These findings could relate to CPPD/pyrophosphate arthropathy.  No acute fracture.    MRI lumbar spine 6-8-23:  multilevel degenerative chagnes.  L1 retrolisthesis on L2.  L2/3 retrolisthesis, facet hypertrophy, ligamentous hypertrophy with severe central canal narrowing and occlusion of the thecal sac.  L3/4 broad based disk with right greater than left facet hypertrophy with severe right foraminal narrowing and moderate central canal narrowing.  L4 anterolisthesis on L5 with broad based disk and moderate central canal narrowing.  L5/S1 degenerative disk dessication.     Labs:  No results found for: "LABA1C", "HGBA1C"    Lab " Results   Component Value Date    WBC 5.26 06/28/2023    HGB 14.2 06/28/2023    HCT 43.5 06/28/2023    MCV 93 06/28/2023     06/28/2023           Problem List Items Addressed This Visit    None  Visit Diagnoses       Spinal stenosis of lumbar region, unspecified whether neurogenic claudication present    -  Primary    Chronic foot pain, left        Relevant Orders    Ambulatory referral/consult to Podiatry    Spondylolisthesis, unspecified spinal region        Right leg weakness                  Assessment:     Mr. Mai has known spondylolisthesis in the lumbar spine.  He has acute onset right buttock pain - myofascial vs referred from [facility].  Acute onset weakness in the right hip flexors due to L2/3 severe central canal stenosis.  Still significant, but less stenosis at L3/4, L4/5.  Given weakness will refer to neurosurgery.  Will get dynamic xrays prior to neurosurgery visits.  Continue with orthopedic follow up as well.      We reviewed his EMG results.  He does have some chronic lumbar radiculopathy from L5/S1 however no significant pain.  He is finding improvement with his intermittent right lower extremity strength with formal physical therapy.  At this time I think this best to continue to maximize conservative therapy.  On exam he has full strength in only has this weakness with certain movements.  But overall he is finding improvement with conservative modalities.  For his chronic left foot pain I have placed a referral for him to be evaluated by 1 of our podiatrists.  Follow up as needed.  Can potentially consider lumbar epidural in the future however I am unsure how much improvement he would find in his weakness with this.        : Not applicable    This note was completed with dictation software and grammatical errors may exist.      The total time spent for evaluation and management on 10/04/2023 including reviewing separately obtained history, performing a medically  appropriate exam and evaluation, documenting clinical information in the health record, independently interpreting results and communicating them to the patient/family/caregiver, and ordering medications/tests/procedures was between 30-39 minutes.

## 2023-10-04 NOTE — TELEPHONE ENCOUNTER
Had an appt with Dr Dominique but was recommend to follow up with Dr Elkins. Pt has seen Dr elkins in the past and noted theres nothing he could do. Informed pt I will ask Dr Elkins if he would like to see him again. If not where to refer.

## 2023-10-12 ENCOUNTER — OFFICE VISIT (OUTPATIENT)
Dept: ORTHOPEDICS | Facility: CLINIC | Age: 78
End: 2023-10-12
Payer: MEDICARE

## 2023-10-12 DIAGNOSIS — M19.90 OSTEOARTHRITIS OF TALONAVICULAR JOINT DUE TO INFLAMMATORY ARTHRITIS: Primary | ICD-10-CM

## 2023-10-12 DIAGNOSIS — M19.279 OSTEOARTHRITIS OF TALONAVICULAR JOINT DUE TO INFLAMMATORY ARTHRITIS: Primary | ICD-10-CM

## 2023-10-12 PROCEDURE — 1101F PT FALLS ASSESS-DOCD LE1/YR: CPT | Mod: HCNC,CPTII,S$GLB, | Performed by: ORTHOPAEDIC SURGERY

## 2023-10-12 PROCEDURE — 99999 PR PBB SHADOW E&M-EST. PATIENT-LVL II: ICD-10-PCS | Mod: PBBFAC,HCNC,, | Performed by: ORTHOPAEDIC SURGERY

## 2023-10-12 PROCEDURE — 99214 PR OFFICE/OUTPT VISIT, EST, LEVL IV, 30-39 MIN: ICD-10-PCS | Mod: HCNC,S$GLB,, | Performed by: ORTHOPAEDIC SURGERY

## 2023-10-12 PROCEDURE — 1101F PR PT FALLS ASSESS DOC 0-1 FALLS W/OUT INJ PAST YR: ICD-10-PCS | Mod: HCNC,CPTII,S$GLB, | Performed by: ORTHOPAEDIC SURGERY

## 2023-10-12 PROCEDURE — 1125F AMNT PAIN NOTED PAIN PRSNT: CPT | Mod: HCNC,CPTII,S$GLB, | Performed by: ORTHOPAEDIC SURGERY

## 2023-10-12 PROCEDURE — 1159F PR MEDICATION LIST DOCUMENTED IN MEDICAL RECORD: ICD-10-PCS | Mod: HCNC,CPTII,S$GLB, | Performed by: ORTHOPAEDIC SURGERY

## 2023-10-12 PROCEDURE — 1125F PR PAIN SEVERITY QUANTIFIED, PAIN PRESENT: ICD-10-PCS | Mod: HCNC,CPTII,S$GLB, | Performed by: ORTHOPAEDIC SURGERY

## 2023-10-12 PROCEDURE — 3288F PR FALLS RISK ASSESSMENT DOCUMENTED: ICD-10-PCS | Mod: HCNC,CPTII,S$GLB, | Performed by: ORTHOPAEDIC SURGERY

## 2023-10-12 PROCEDURE — 99999 PR PBB SHADOW E&M-EST. PATIENT-LVL II: CPT | Mod: PBBFAC,HCNC,, | Performed by: ORTHOPAEDIC SURGERY

## 2023-10-12 PROCEDURE — 1160F RVW MEDS BY RX/DR IN RCRD: CPT | Mod: HCNC,CPTII,S$GLB, | Performed by: ORTHOPAEDIC SURGERY

## 2023-10-12 PROCEDURE — 99214 OFFICE O/P EST MOD 30 MIN: CPT | Mod: HCNC,S$GLB,, | Performed by: ORTHOPAEDIC SURGERY

## 2023-10-12 PROCEDURE — 1159F MED LIST DOCD IN RCRD: CPT | Mod: HCNC,CPTII,S$GLB, | Performed by: ORTHOPAEDIC SURGERY

## 2023-10-12 PROCEDURE — 3288F FALL RISK ASSESSMENT DOCD: CPT | Mod: HCNC,CPTII,S$GLB, | Performed by: ORTHOPAEDIC SURGERY

## 2023-10-12 PROCEDURE — 1160F PR REVIEW ALL MEDS BY PRESCRIBER/CLIN PHARMACIST DOCUMENTED: ICD-10-PCS | Mod: HCNC,CPTII,S$GLB, | Performed by: ORTHOPAEDIC SURGERY

## 2023-10-12 NOTE — PROGRESS NOTES
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   Date of Surgery: Left tibiotalar and distal tib-fib arthrodesis (2005)  Date of Injury: none; DOO: October 2022  Return visit: PRN  X-rays on Return: pending patient complaint    Chief Complaint:   Chief Complaint   Patient presents with    Left Foot - Pain     Present History:  Srinivasan Bernal III is a 78 y.o. male who presents via referral from Dr. Gio Goldberg.  Patient has done well status post left ankle arthrodesis as outlined above.  Presents today with a 2 week history of increased pain localized to the medial/dorsal medial midfoot.  Denies any history of injury.  Denies any numbness or tingling.  No history of immobilization, physical therapy, etc..  Has been taking over-the-counter oral NSAIDs intermittently as needed for pain with moderate relief.  Past medical history significant for CAD status post stent placement on Brilinta; denies tobacco use.    10/12/2023:  Patient returns today to discuss persistent anterior hindfoot/midfoot pain.Tall cam boot does providel moderate relief.  Unable to take oral NSAIDs due to Brilinta use.  Would like to discuss the potential for surgical intervention.      Past Medical History:   Diagnosis Date    Cataracts, bilateral     2009, 2013    Coronary artery disease 2004    Hypercholesteremia     Kidney stone 2015    Urerteroscopy    Skin cancer 2015    left ankle/ left ear       Past Surgical History:   Procedure Laterality Date    ANKLE FUSION      CIRCUMCISION      COLONOSCOPY N/A 09/20/2022    Procedure: COLONOSCOPY;  Surgeon: Natasha Sommer MD;  Location: [facility];  Service: Endoscopy;  Laterality: N/A;    FOOT SURGERY      HERNIA REPAIR Left 2008    KNEE ARTHROSCOPY Left 2010    KNEE ARTHROSCOPY Right 2010    KNEE SURGERY Left 2010    partial replacement    KNEE SURGERY Right 2012    replacement    MOUTH SURGERY      STENT, DRUG ELUTING, SINGLE VESSEL, CORONARY      TOE SURGERY       TONSILLECTOMY         Current Outpatient Medications   Medication Sig    aspirin (ECOTRIN) 81 MG EC tablet Take 81 mg by mouth once daily.    atorvastatin (LIPITOR) 80 MG tablet TAKE 1 TABLET ONE TIME DAILY    ezetimibe (ZETIA) 10 mg tablet TAKE 1 TABLET ONE TIME DAILY    ibuprofen (ADVIL,MOTRIN) 200 MG tablet Take 200 mg by mouth every 6 (six) hours as needed for Pain.    perindopril erbumine (ACEON) 4 mg tablet TAKE 1 TABLET ONE TIME DAILY    ticagrelor (BRILINTA) 90 mg tablet Take 1 tablet twice a day by oral route for 90 days.    tadalafiL (CIALIS) 20 MG Tab Take 1 tablet (20 mg total) by mouth once daily.     No current facility-administered medications for this visit.       Review of patient's allergies indicates:  No Known Allergies    Family History   Problem Relation Age of Onset    Heart disease Father     Dementia Mother        Social History     Socioeconomic History    Marital status:    Tobacco Use    Smoking status: Some Days     Types: Cigars    Smokeless tobacco: Never   Substance and Sexual Activity    Alcohol use: Yes     Comment: rarely    Drug use: No    Sexual activity: Yes     Social Determinants of Health     Financial Resource Strain: Low Risk  (10/5/2023)    Overall Financial Resource Strain (CARDIA)     Difficulty of Paying Living Expenses: Not hard at all   Food Insecurity: No Food Insecurity (10/5/2023)    Hunger Vital Sign     Worried About Running Out of Food in the Last Year: Never true     Ran Out of Food in the Last Year: Never true   Transportation Needs: No Transportation Needs (10/5/2023)    PRAPARE - Transportation     Lack of Transportation (Medical): No     Lack of Transportation (Non-Medical): No   Physical Activity: Sufficiently Active (10/5/2023)    Exercise Vital Sign     Days of Exercise per Week: 7 days     Minutes of Exercise per Session: 30 min   Stress: No Stress Concern Present (10/5/2023)    [employer] [place] [employer] - Occupational Stress  Questionnaire     Feeling of Stress : Not at all   Social Connections: Unknown (10/5/2023)    Social Connection and Isolation Panel [NHANES]     Frequency of Communication with Friends and Family: Twice a week     Frequency of Social Gatherings with Friends and Family: Three times a week     Active Member of Clubs or Organizations: Yes     Attends Club or Organization Meetings: More than 4 times per year     Marital Status:    Housing Stability: Low Risk  (10/5/2023)    Housing Stability Vital Sign     Unable to Pay for Housing in the Last Year: No     Number of Places Lived in the Last Year: 1     Unstable Housing in the Last Year: No       Physical exam:  There were no vitals filed for this visit.  There is no height or weight on file to calculate BMI.  General: In no apparent distress; well developed and well nourished.  HEENT: normocephalic; atraumatic.  Cardiovascular: regular rate.  Respiratory: no increased work of breathing.  Musculoskeletal:   Gait: mild antalgic  Inspection:    SUBTLE CAVUS.     Previous surgical scars about the left ankle are well healed.  Solid fusion on stress tibiotalar joint.  Patient with significant tenderness localized to the talonavicular joint with palpable dorsal medial osteophytes.  MILD tenderness at the subtalar joint.  Mild to moderate hindfoot varus that is rigid with attempted passive correction.  No correction with Harman block testing.  No laxity with anterior drawer testing.  Silfverskiold: n/a  Alignment:  Knee: neutral               Ankle: neutral              Hindfoot: varus              Forefoot: neutral   Sensation:              SILT distally  ROM:              Ankle:none              Subtalar: little to none  Pulses: 2+ DP/PT pulses.                   Imaging Studies/Outside documentation:  I have ordered/reviewed/interpreted the following images/outside documentation:  1. Weight bearing 3-views of Left foot and ankle:  No acute bony abnormality noted.   Evidence of previous tibiotalar and distal tib-fib arthrodesis, well healed.  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 Multiple large dorsal osteophytes. Moderate subtalar DJD.        Assessment:  Srinivasan Bernal III is a 78 y.o. 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     Plan:   Clinical and radiographic findings were again discussed at length today.  I spent > 20 minutes with the patient.  Operative versus nonoperative treatment options were described.  As previously noted, patient has end-stage bone-on-bone arthritis of the talonavicular joint not amenable to corticosteroid injection.    Patient is minimally symptomatic at the subtalar joint today.  He does have subtle cavus which may need to be addressed surgical intervention in regard to talonavicular translation.  May require CC joint fusion.      Patient would like to hold off on surgical intervention.  Should he decide to proceed with when he would obtain a CT left ankle without contrast for preoperative planning.  Patient voiced understanding.  All questions were answered.  Return to clinic after CT is complete.        This note was created using voice recognition software and may contain grammatical errors.

## 2023-10-26 ENCOUNTER — OFFICE VISIT (OUTPATIENT)
Dept: ORTHOPEDICS | Facility: CLINIC | Age: 78
End: 2023-10-26
Payer: MEDICARE

## 2023-10-26 VITALS — BODY MASS INDEX: 22.48 KG/M2 | HEIGHT: 72 IN | WEIGHT: 166 LBS

## 2023-10-26 DIAGNOSIS — M17.11 PRIMARY OSTEOARTHRITIS OF RIGHT KNEE: Primary | ICD-10-CM

## 2023-10-26 PROCEDURE — 1160F RVW MEDS BY RX/DR IN RCRD: CPT | Mod: HCNC,CPTII,S$GLB, | Performed by: ORTHOPAEDIC SURGERY

## 2023-10-26 PROCEDURE — 99999 PR PBB SHADOW E&M-EST. PATIENT-LVL III: CPT | Mod: PBBFAC,HCNC,, | Performed by: ORTHOPAEDIC SURGERY

## 2023-10-26 PROCEDURE — 1159F PR MEDICATION LIST DOCUMENTED IN MEDICAL RECORD: ICD-10-PCS | Mod: HCNC,CPTII,S$GLB, | Performed by: ORTHOPAEDIC SURGERY

## 2023-10-26 PROCEDURE — 99214 OFFICE O/P EST MOD 30 MIN: CPT | Mod: HCNC,S$GLB,, | Performed by: ORTHOPAEDIC SURGERY

## 2023-10-26 PROCEDURE — 1101F PT FALLS ASSESS-DOCD LE1/YR: CPT | Mod: HCNC,CPTII,S$GLB, | Performed by: ORTHOPAEDIC SURGERY

## 2023-10-26 PROCEDURE — 1160F PR REVIEW ALL MEDS BY PRESCRIBER/CLIN PHARMACIST DOCUMENTED: ICD-10-PCS | Mod: HCNC,CPTII,S$GLB, | Performed by: ORTHOPAEDIC SURGERY

## 2023-10-26 PROCEDURE — 1159F MED LIST DOCD IN RCRD: CPT | Mod: HCNC,CPTII,S$GLB, | Performed by: ORTHOPAEDIC SURGERY

## 2023-10-26 PROCEDURE — 1126F AMNT PAIN NOTED NONE PRSNT: CPT | Mod: HCNC,CPTII,S$GLB, | Performed by: ORTHOPAEDIC SURGERY

## 2023-10-26 PROCEDURE — 1126F PR PAIN SEVERITY QUANTIFIED, NO PAIN PRESENT: ICD-10-PCS | Mod: HCNC,CPTII,S$GLB, | Performed by: ORTHOPAEDIC SURGERY

## 2023-10-26 PROCEDURE — 3288F FALL RISK ASSESSMENT DOCD: CPT | Mod: HCNC,CPTII,S$GLB, | Performed by: ORTHOPAEDIC SURGERY

## 2023-10-26 PROCEDURE — 99999 PR PBB SHADOW E&M-EST. PATIENT-LVL III: ICD-10-PCS | Mod: PBBFAC,HCNC,, | Performed by: ORTHOPAEDIC SURGERY

## 2023-10-26 PROCEDURE — 3288F PR FALLS RISK ASSESSMENT DOCUMENTED: ICD-10-PCS | Mod: HCNC,CPTII,S$GLB, | Performed by: ORTHOPAEDIC SURGERY

## 2023-10-26 PROCEDURE — 1101F PR PT FALLS ASSESS DOC 0-1 FALLS W/OUT INJ PAST YR: ICD-10-PCS | Mod: HCNC,CPTII,S$GLB, | Performed by: ORTHOPAEDIC SURGERY

## 2023-10-26 PROCEDURE — 99214 PR OFFICE/OUTPT VISIT, EST, LEVL IV, 30-39 MIN: ICD-10-PCS | Mod: HCNC,S$GLB,, | Performed by: ORTHOPAEDIC SURGERY

## 2023-11-02 NOTE — PROGRESS NOTES
Chief Complaint   Patient presents with    Right Knee - Follow-up       HPI:   This is a 78 y.o. who returns today status post right TKA > 10 years ago. Patient has been wearing the brace and notes good relief.  Pain is dull. No numbness or tingling. No associated signs or symptoms.    Past Medical History:   Diagnosis Date    Cataracts, bilateral     2009, 2013    Coronary artery disease 2004    Hypercholesteremia     Kidney stone 2015    Urerteroscopy    Skin cancer 2015    left ankle/ left ear     Past Surgical History:   Procedure Laterality Date    ANKLE FUSION      CIRCUMCISION      COLONOSCOPY N/A 09/20/2022    Procedure: COLONOSCOPY;  Surgeon: Natasha Sommer MD;  Location: [facility];  Service: Endoscopy;  Laterality: N/A;    FOOT SURGERY      HERNIA REPAIR Left 2008    KNEE ARTHROSCOPY Left 2010    KNEE ARTHROSCOPY Right 2010    KNEE SURGERY Left 2010    partial replacement    KNEE SURGERY Right 2012    replacement    MOUTH SURGERY      STENT, DRUG ELUTING, SINGLE VESSEL, CORONARY      TOE SURGERY      TONSILLECTOMY       Current Outpatient Medications on File Prior to Visit   Medication Sig Dispense Refill    aspirin (ECOTRIN) 81 MG EC tablet Take 81 mg by mouth once daily.      atorvastatin (LIPITOR) 80 MG tablet TAKE 1 TABLET ONE TIME DAILY 90 tablet 1    ezetimibe (ZETIA) 10 mg tablet TAKE 1 TABLET ONE TIME DAILY 90 tablet 1    ibuprofen (ADVIL,MOTRIN) 200 MG tablet Take 200 mg by mouth every 6 (six) hours as needed for Pain.      perindopril erbumine (ACEON) 4 mg tablet TAKE 1 TABLET ONE TIME DAILY 90 tablet 2    ticagrelor (BRILINTA) 90 mg tablet Take 1 tablet twice a day by oral route for 90 days.      tadalafiL (CIALIS) 20 MG Tab Take 1 tablet (20 mg total) by mouth once daily. 30 tablet 11     No current facility-administered medications on file prior to visit.     Review of patient's allergies indicates:  No Known Allergies  Family History   Problem Relation Age of Onset    Heart disease  Father     Dementia Mother      Social History     Socioeconomic History    Marital status:    Tobacco Use    Smoking status: Some Days     Types: Cigars    Smokeless tobacco: Never   Substance and Sexual Activity    Alcohol use: Yes     Comment: rarely    Drug use: No    Sexual activity: Yes     Social Determinants of Health     Financial Resource Strain: Low Risk  (10/5/2023)    Overall Financial Resource Strain (CARDIA)     Difficulty of Paying Living Expenses: Not hard at all   Food Insecurity: No Food Insecurity (10/5/2023)    Hunger Vital Sign     Worried About Running Out of Food in the Last Year: Never true     Ran Out of Food in the Last Year: Never true   Transportation Needs: No Transportation Needs (10/5/2023)    PRAPARE - Transportation     Lack of Transportation (Medical): No     Lack of Transportation (Non-Medical): No   Physical Activity: Sufficiently Active (10/5/2023)    Exercise Vital Sign     Days of Exercise per Week: 7 days     Minutes of Exercise per Session: 30 min   Stress: No Stress Concern Present (10/5/2023)    [employer] - Occupational Stress Questionnaire     Feeling of Stress : Not at all   Social Connections: Unknown (10/5/2023)    Social Connection and Isolation Panel [NHANES]     Frequency of Communication with Friends and Family: Twice a week     Frequency of Social Gatherings with Friends and Family: Three times a week     Active Member of Clubs or Organizations: Yes     Attends Club or Organization Meetings: More than 4 times per year     Marital Status:    Housing Stability: Low Risk  (10/5/2023)    Housing Stability Vital Sign     Unable to Pay for Housing in the Last Year: No     Number of Places Lived in the Last Year: 1     Unstable Housing in the Last Year: No       Review of Systems:  Constitutional:  Denies fever or chills   Eyes:  Denies change in visual acuity   HENT:  Denies nasal congestion or sore throat   Respiratory:   Denies cough or shortness of breath   Cardiovascular:  Denies chest pain or edema   GI:  Denies abdominal pain, nausea, vomiting, bloody stools or diarrhea   :  Denies dysuria   Integument:  Denies rash   Neurologic:  Denies headache, focal weakness or sensory changes   Endocrine:  Denies polyuria or polydipsia   Lymphatic:  Denies swollen glands   Psychiatric:  Denies depression or anxiety     Physical Exam:   Constitutional:  Well developed, well nourished, no acute distress, non-toxic appearance   Integument:  Well hydrated, no rash   Lymphatic:  No lymphadenopathy noted   Neurologic:  Alert & oriented x 3, CN 2-12 normal, normal motor function, normal sensory function, no focal deficits noted   Psychiatric:  Speech and behavior appropriate   Gi: abdomen soft  Eyes: EOMI    L knee  Exam performed same as contralateral side and is normal  R knee  Unstable to stress. Overall normal alignment. Skin intact. Compartments soft. NVI distally.       Primary osteoarthritis of right knee        Continue brace. RTC as needed.

## 2024-01-11 DIAGNOSIS — Z00.00 ENCOUNTER FOR MEDICARE ANNUAL WELLNESS EXAM: ICD-10-CM

## 2024-02-02 ENCOUNTER — LAB VISIT (OUTPATIENT)
Dept: LAB | Facility: HOSPITAL | Age: 79
End: 2024-02-02
Attending: INTERNAL MEDICINE
Payer: MEDICARE

## 2024-02-02 DIAGNOSIS — E78.5 DYSLIPIDEMIA: ICD-10-CM

## 2024-02-02 DIAGNOSIS — I10 ESSENTIAL HYPERTENSION: ICD-10-CM

## 2024-02-02 LAB
ALBUMIN SERPL BCP-MCNC: 3.6 G/DL (ref 3.5–5.2)
ALP SERPL-CCNC: 100 U/L (ref 55–135)
ALT SERPL W/O P-5'-P-CCNC: 15 U/L (ref 10–44)
ANION GAP SERPL CALC-SCNC: 8 MMOL/L (ref 8–16)
AST SERPL-CCNC: 18 U/L (ref 10–40)
BILIRUB SERPL-MCNC: 0.9 MG/DL (ref 0.1–1)
BUN SERPL-MCNC: 18 MG/DL (ref 8–23)
CALCIUM SERPL-MCNC: 9.4 MG/DL (ref 8.7–10.5)
CHLORIDE SERPL-SCNC: 113 MMOL/L (ref 95–110)
CHOLEST SERPL-MCNC: 136 MG/DL (ref 120–199)
CHOLEST/HDLC SERPL: 2.3 {RATIO} (ref 2–5)
CO2 SERPL-SCNC: 23 MMOL/L (ref 23–29)
CREAT SERPL-MCNC: 0.9 MG/DL (ref 0.5–1.4)
EST. GFR  (NO RACE VARIABLE): >60 ML/MIN/1.73 M^2
GLUCOSE SERPL-MCNC: 83 MG/DL (ref 70–110)
HDLC SERPL-MCNC: 58 MG/DL (ref 40–75)
HDLC SERPL: 42.6 % (ref 20–50)
LDLC SERPL CALC-MCNC: 64 MG/DL (ref 63–159)
NONHDLC SERPL-MCNC: 78 MG/DL
POTASSIUM SERPL-SCNC: 4.3 MMOL/L (ref 3.5–5.1)
PROT SERPL-MCNC: 6.2 G/DL (ref 6–8.4)
SODIUM SERPL-SCNC: 144 MMOL/L (ref 136–145)
TRIGL SERPL-MCNC: 70 MG/DL (ref 30–150)

## 2024-02-02 PROCEDURE — 80053 COMPREHEN METABOLIC PANEL: CPT | Mod: HCNC | Performed by: INTERNAL MEDICINE

## 2024-02-02 PROCEDURE — 80061 LIPID PANEL: CPT | Mod: HCNC | Performed by: INTERNAL MEDICINE

## 2024-02-02 PROCEDURE — 36415 COLL VENOUS BLD VENIPUNCTURE: CPT | Mod: HCNC,PO | Performed by: INTERNAL MEDICINE

## 2024-02-09 ENCOUNTER — TELEPHONE (OUTPATIENT)
Dept: FAMILY MEDICINE | Facility: CLINIC | Age: 79
End: 2024-02-09
Payer: MEDICARE

## 2024-02-09 NOTE — TELEPHONE ENCOUNTER
----- Message from Verito Blanc, Patient Care Assistant sent at 2/9/2024  7:51 AM CST -----  Regarding: appointment  Contact: pt  Type:  Sooner Appointment Request    Caller is requesting a sooner appointment.  Caller declined first available appointment listed below.  Caller will not accept being placed on the waitlist and is requesting a message be sent to doctor.    Name of Caller:  pt     When is the first available appointment?  4/1/24    Symptoms:  lab f/u     Would the patient rather a call back or a response via [facility]? Callback     Best Call Back Number:  [phone] (home)     Additional Information:  please call to advise. Thanks!

## 2024-02-19 ENCOUNTER — OFFICE VISIT (OUTPATIENT)
Dept: FAMILY MEDICINE | Facility: CLINIC | Age: 79
End: 2024-02-19
Payer: MEDICARE

## 2024-02-19 VITALS
BODY MASS INDEX: 22.66 KG/M2 | OXYGEN SATURATION: 96 % | SYSTOLIC BLOOD PRESSURE: 106 MMHG | TEMPERATURE: 98 F | HEART RATE: 68 BPM | WEIGHT: 167.31 LBS | DIASTOLIC BLOOD PRESSURE: 64 MMHG | HEIGHT: 72 IN

## 2024-02-19 DIAGNOSIS — Z79.899 ENCOUNTER FOR LONG-TERM (CURRENT) USE OF MEDICATIONS: ICD-10-CM

## 2024-02-19 DIAGNOSIS — I25.112 ATHEROSCLEROSIS OF NATIVE CORONARY ARTERY OF NATIVE HEART WITH REFRACTORY ANGINA PECTORIS: ICD-10-CM

## 2024-02-19 DIAGNOSIS — E78.2 MIXED HYPERLIPIDEMIA: ICD-10-CM

## 2024-02-19 DIAGNOSIS — I10 ESSENTIAL HYPERTENSION: Primary | ICD-10-CM

## 2024-02-19 DIAGNOSIS — Z23 NEED FOR VACCINATION: ICD-10-CM

## 2024-02-19 PROCEDURE — 1160F RVW MEDS BY RX/DR IN RCRD: CPT | Mod: HCNC,CPTII,S$GLB, | Performed by: INTERNAL MEDICINE

## 2024-02-19 PROCEDURE — 1126F AMNT PAIN NOTED NONE PRSNT: CPT | Mod: HCNC,CPTII,S$GLB, | Performed by: INTERNAL MEDICINE

## 2024-02-19 PROCEDURE — 99999 PR PBB SHADOW E&M-EST. PATIENT-LVL III: CPT | Mod: PBBFAC,HCNC,, | Performed by: INTERNAL MEDICINE

## 2024-02-19 PROCEDURE — 3078F DIAST BP <80 MM HG: CPT | Mod: HCNC,CPTII,S$GLB, | Performed by: INTERNAL MEDICINE

## 2024-02-19 PROCEDURE — 99214 OFFICE O/P EST MOD 30 MIN: CPT | Mod: HCNC,S$GLB,, | Performed by: INTERNAL MEDICINE

## 2024-02-19 PROCEDURE — G0008 ADMIN INFLUENZA VIRUS VAC: HCPCS | Mod: HCNC,S$GLB,, | Performed by: INTERNAL MEDICINE

## 2024-02-19 PROCEDURE — 1159F MED LIST DOCD IN RCRD: CPT | Mod: HCNC,CPTII,S$GLB, | Performed by: INTERNAL MEDICINE

## 2024-02-19 PROCEDURE — 3074F SYST BP LT 130 MM HG: CPT | Mod: HCNC,CPTII,S$GLB, | Performed by: INTERNAL MEDICINE

## 2024-02-19 PROCEDURE — 90694 VACC AIIV4 NO PRSRV 0.5ML IM: CPT | Mod: HCNC,S$GLB,, | Performed by: INTERNAL MEDICINE

## 2024-02-19 NOTE — PROGRESS NOTES
Subjective:       Patient ID: Srinivasan Bernal III is a 78 y.o. male.  Chief Complaint: Hypertension     HPI    Low WBC - mild; close to trend.     mild memory changes.  Mostly with names and phrases.  Stable       PND - uncontrolled.       CAD s/p NEW stent 4/2023- on Brilinta; hx 2 previous stents. No chest pain.  Dr Hernandez.   HTN - controlled  HLD - controlled for goal < 70 (range has always been 68 - 78) - disease progressed.    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 Essential hypertension    2. Mixed hyperlipidemia    3. 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 Encounter for long-term (current) use of medications        Plan:       Essential hypertension  -     Comprehensive Metabolic Panel; Future; Expected date: 08/17/2024    Mixed hyperlipidemia  -     Lipid Panel; Future; Expected date: 08/17/2024    Atherosclerosis of native coronary artery of native heart with refractory angina pectoris    Encounter for long-term (current) use of medications  -     CBC Auto Differential; Future; Expected date: 08/17/2024            Continue current management and monitor.  Other diagnoses were reviewed and found stable and will continue to monitor.  Counseled on regular exercise, maintenance of a healthy weight, balanced diet rich in fruits/vegetables and lean protein, and avoidance of unhealthy habits like smoking and excessive alcohol intake.   Also, counseled on importance of being compliant with medication, health appointments, diet and exercise.     Follow up in about 6 months (around 8/19/2024).      Medication List with Changes/Refills   Current Medications    ASPIRIN (ECOTRIN) 81 MG EC TABLET    Take 81 mg by mouth once daily.    ATORVASTATIN (LIPITOR) 80 MG TABLET    TAKE 1 TABLET EVERY  "DAY    EZETIMIBE (ZETIA) 10 MG TABLET    TAKE 1 TABLET EVERY DAY    IBUPROFEN (ADVIL,MOTRIN) 200 MG TABLET    Take 200 mg by mouth every 6 (six) hours as needed for Pain.    PERINDOPRIL ERBUMINE (ACEON) 4 MG TABLET    TAKE 1 TABLET ONE TIME DAILY    TADALAFIL (CIALIS) 20 MG TAB    Take 1 tablet (20 mg total) by mouth once daily.    TICAGRELOR (BRILINTA) 90 MG TABLET    Take 1 tablet twice a day by oral route for 90 days.       BP Readings from Last 3 Encounters:   02/19/24 106/64   10/04/23 111/70   08/10/23 116/68     No results found for: "HGBA1C"  No results found for: "TSH"  Lab Results   Component Value Date    LDLCALC 64.0 02/02/2024    LDLCALC 77.2 06/28/2023    LDLCALC 72.6 01/04/2023     Lab Results   Component Value Date    TRIG 70 02/02/2024    TRIG 54 06/28/2023    TRIG 47 01/04/2023     Wt Readings from Last 3 Encounters:   02/19/24 75.9 kg (167 lb 5.3 oz)   10/26/23 75.3 kg (166 lb)   10/04/23 75.6 kg (166 lb 10 oz)     Lab Results   Component Value Date    HGB 14.2 06/28/2023    HCT 43.5 06/28/2023    WBC 5.26 06/28/2023    ALT 15 02/02/2024    AST 18 02/02/2024     02/02/2024    K 4.3 02/02/2024    CREATININE 0.9 02/02/2024    PSA 0.90 01/04/2023           Review of Systems        Objective:      Vitals:    02/19/24 0741   BP: 106/64   Pulse: 68   Temp: 97.7 °F (36.5 °C)     Physical Exam  Vitals reviewed.   Constitutional:       Appearance: Normal appearance.   Eyes:      Conjunctiva/sclera: Conjunctivae normal.   Cardiovascular:      Rate and Rhythm: Normal rate.   Pulmonary:      Effort: Pulmonary effort is normal.      Breath sounds: Normal breath sounds.   Musculoskeletal:      Cervical back: Normal range of motion.      Comments: Normal ROM bilateral    Skin:     General: Skin is warm and dry.   Neurological:      Mental Status: He is alert.      Cranial Nerves: Cranial nerve deficit: grossly intact.   Psychiatric:      Comments: Alert and orientated                   "

## 2024-04-04 ENCOUNTER — PATIENT MESSAGE (OUTPATIENT)
Dept: FAMILY MEDICINE | Facility: CLINIC | Age: 79
End: 2024-04-04
Payer: MEDICARE

## 2024-04-15 DIAGNOSIS — I10 ESSENTIAL HYPERTENSION: ICD-10-CM

## 2024-04-15 NOTE — TELEPHONE ENCOUNTER
No care due was identified.  [facility] Embedded Care Due Messages. Reference number: [id number].   4/15/2024 2:50:39 PM CDT

## 2024-04-16 RX ORDER — PERINDOPRIL ERBUMINE 4 MG/1
4 TABLET ORAL DAILY
Qty: 90 TABLET | Refills: 2 | Status: SHIPPED | OUTPATIENT
Start: 2024-04-16

## 2024-07-02 DIAGNOSIS — E78.5 DYSLIPIDEMIA: ICD-10-CM

## 2024-07-02 DIAGNOSIS — I25.10 CORONARY ARTERY DISEASE: ICD-10-CM

## 2024-07-02 RX ORDER — ATORVASTATIN CALCIUM 80 MG/1
80 TABLET, FILM COATED ORAL NIGHTLY
Qty: 90 TABLET | Refills: 2 | Status: SHIPPED | OUTPATIENT
Start: 2024-07-02

## 2024-07-02 RX ORDER — EZETIMIBE 10 MG/1
10 TABLET ORAL DAILY
Qty: 90 TABLET | Refills: 2 | Status: SHIPPED | OUTPATIENT
Start: 2024-07-02

## 2024-07-02 NOTE — TELEPHONE ENCOUNTER
No care due was identified.  [facility] Embedded Care Due Messages. Reference number: [id number].   7/02/2024 11:05:16 AM CDT

## 2024-07-02 NOTE — TELEPHONE ENCOUNTER
Refill Decision Note   Srinivasan Gabe  is requesting a refill authorization.  Brief Assessment and Rationale for Refill:  Approve     Medication Therapy Plan:         Comments:     Note composed:1:39 PM 07/02/2024

## 2024-07-03 DIAGNOSIS — I25.10 CORONARY ARTERY DISEASE: ICD-10-CM

## 2024-07-03 DIAGNOSIS — E78.5 DYSLIPIDEMIA: ICD-10-CM

## 2024-07-03 RX ORDER — ATORVASTATIN CALCIUM 80 MG/1
TABLET, FILM COATED ORAL
Qty: 90 TABLET | Refills: 0 | OUTPATIENT
Start: 2024-07-03

## 2024-07-03 RX ORDER — EZETIMIBE 10 MG/1
TABLET ORAL
Qty: 90 TABLET | Refills: 0 | OUTPATIENT
Start: 2024-07-03

## 2024-07-03 NOTE — TELEPHONE ENCOUNTER
Refill Decision Note   Srinivasan Bernal  is requesting a refill authorization.  Brief Assessment and Rationale for Refill:  Quick Discontinue     Medication Therapy Plan:    Pharmacy is requesting new scripts for the following medications without required information, (sig/ frequency/qty/etc)      Medication Reconciliation Completed: No     Comments: Pharmacies have been requesting medications for patients without required information, (sig, frequency, qty, etc.). In addition, requests are sent for medication(s) pt. are currently not taking, and medications patients have never taken.    We have spoken to the pharmacies about these request types and advised their teams previously that we are unable to assess these New Script requests and require all details for these requests. This is a known issue and has been reported.     Note composed:2:42 PM 07/03/2024

## 2024-07-03 NOTE — TELEPHONE ENCOUNTER
No care due was identified.  [facility] Embedded Care Due Messages. Reference number: [id number].   7/03/2024 12:01:24 PM CDT

## 2024-08-12 ENCOUNTER — LAB VISIT (OUTPATIENT)
Dept: LAB | Facility: HOSPITAL | Age: 79
End: 2024-08-12
Attending: INTERNAL MEDICINE
Payer: MEDICARE

## 2024-08-12 DIAGNOSIS — I10 ESSENTIAL HYPERTENSION: ICD-10-CM

## 2024-08-12 DIAGNOSIS — Z79.899 ENCOUNTER FOR LONG-TERM (CURRENT) USE OF MEDICATIONS: ICD-10-CM

## 2024-08-12 DIAGNOSIS — E78.2 MIXED HYPERLIPIDEMIA: ICD-10-CM

## 2024-08-12 LAB
ALBUMIN SERPL BCP-MCNC: 3.5 G/DL (ref 3.5–5.2)
ALP SERPL-CCNC: 91 U/L (ref 55–135)
ALT SERPL W/O P-5'-P-CCNC: 14 U/L (ref 10–44)
ANION GAP SERPL CALC-SCNC: 8 MMOL/L (ref 8–16)
AST SERPL-CCNC: 16 U/L (ref 10–40)
BASOPHILS # BLD AUTO: 0.05 K/UL (ref 0–0.2)
BASOPHILS NFR BLD: 1 % (ref 0–1.9)
BILIRUB SERPL-MCNC: 0.7 MG/DL (ref 0.1–1)
BUN SERPL-MCNC: 19 MG/DL (ref 8–23)
CALCIUM SERPL-MCNC: 9.3 MG/DL (ref 8.7–10.5)
CHLORIDE SERPL-SCNC: 112 MMOL/L (ref 95–110)
CHOLEST SERPL-MCNC: 130 MG/DL (ref 120–199)
CHOLEST/HDLC SERPL: 2.5 {RATIO} (ref 2–5)
CO2 SERPL-SCNC: 23 MMOL/L (ref 23–29)
CREAT SERPL-MCNC: 0.9 MG/DL (ref 0.5–1.4)
DIFFERENTIAL METHOD BLD: ABNORMAL
EOSINOPHIL # BLD AUTO: 0.1 K/UL (ref 0–0.5)
EOSINOPHIL NFR BLD: 2.6 % (ref 0–8)
ERYTHROCYTE [DISTWIDTH] IN BLOOD BY AUTOMATED COUNT: 12.9 % (ref 11.5–14.5)
EST. GFR  (NO RACE VARIABLE): >60 ML/MIN/1.73 M^2
GLUCOSE SERPL-MCNC: 96 MG/DL (ref 70–110)
HCT VFR BLD AUTO: 40.9 % (ref 40–54)
HDLC SERPL-MCNC: 52 MG/DL (ref 40–75)
HDLC SERPL: 40 % (ref 20–50)
HGB BLD-MCNC: 13.6 G/DL (ref 14–18)
IMM GRANULOCYTES # BLD AUTO: 0.01 K/UL (ref 0–0.04)
IMM GRANULOCYTES NFR BLD AUTO: 0.2 % (ref 0–0.5)
LDLC SERPL CALC-MCNC: 68 MG/DL (ref 63–159)
LYMPHOCYTES # BLD AUTO: 1 K/UL (ref 1–4.8)
LYMPHOCYTES NFR BLD: 19.6 % (ref 18–48)
MCH RBC QN AUTO: 30.9 PG (ref 27–31)
MCHC RBC AUTO-ENTMCNC: 33.3 G/DL (ref 32–36)
MCV RBC AUTO: 93 FL (ref 82–98)
MONOCYTES # BLD AUTO: 0.4 K/UL (ref 0.3–1)
MONOCYTES NFR BLD: 8.2 % (ref 4–15)
NEUTROPHILS # BLD AUTO: 3.4 K/UL (ref 1.8–7.7)
NEUTROPHILS NFR BLD: 68.4 % (ref 38–73)
NONHDLC SERPL-MCNC: 78 MG/DL
NRBC BLD-RTO: 0 /100 WBC
PLATELET # BLD AUTO: 185 K/UL (ref 150–450)
PMV BLD AUTO: 10.2 FL (ref 9.2–12.9)
POTASSIUM SERPL-SCNC: 4.1 MMOL/L (ref 3.5–5.1)
PROT SERPL-MCNC: 6.2 G/DL (ref 6–8.4)
RBC # BLD AUTO: 4.4 M/UL (ref 4.6–6.2)
SODIUM SERPL-SCNC: 143 MMOL/L (ref 136–145)
TRIGL SERPL-MCNC: 50 MG/DL (ref 30–150)
WBC # BLD AUTO: 5.01 K/UL (ref 3.9–12.7)

## 2024-08-12 PROCEDURE — 80061 LIPID PANEL: CPT | Mod: HCNC | Performed by: INTERNAL MEDICINE

## 2024-08-12 PROCEDURE — 85025 COMPLETE CBC W/AUTO DIFF WBC: CPT | Mod: HCNC | Performed by: INTERNAL MEDICINE

## 2024-08-12 PROCEDURE — 36415 COLL VENOUS BLD VENIPUNCTURE: CPT | Mod: HCNC,PO | Performed by: INTERNAL MEDICINE

## 2024-08-12 PROCEDURE — 80053 COMPREHEN METABOLIC PANEL: CPT | Mod: HCNC | Performed by: INTERNAL MEDICINE

## 2024-08-19 ENCOUNTER — OFFICE VISIT (OUTPATIENT)
Dept: FAMILY MEDICINE | Facility: CLINIC | Age: 79
End: 2024-08-19
Payer: MEDICARE

## 2024-08-19 VITALS
WEIGHT: 166.25 LBS | OXYGEN SATURATION: 97 % | SYSTOLIC BLOOD PRESSURE: 108 MMHG | HEART RATE: 68 BPM | TEMPERATURE: 98 F | DIASTOLIC BLOOD PRESSURE: 66 MMHG | HEIGHT: 72 IN | BODY MASS INDEX: 22.52 KG/M2

## 2024-08-19 DIAGNOSIS — E78.2 MIXED HYPERLIPIDEMIA: ICD-10-CM

## 2024-08-19 DIAGNOSIS — I10 ESSENTIAL HYPERTENSION: ICD-10-CM

## 2024-08-19 DIAGNOSIS — Z00.00 ROUTINE PHYSICAL EXAMINATION: Primary | ICD-10-CM

## 2024-08-19 DIAGNOSIS — R41.3 MEMORY CHANGE: ICD-10-CM

## 2024-08-19 PROCEDURE — 3288F FALL RISK ASSESSMENT DOCD: CPT | Mod: HCNC,CPTII,S$GLB, | Performed by: INTERNAL MEDICINE

## 2024-08-19 PROCEDURE — 1160F RVW MEDS BY RX/DR IN RCRD: CPT | Mod: HCNC,CPTII,S$GLB, | Performed by: INTERNAL MEDICINE

## 2024-08-19 PROCEDURE — 1101F PT FALLS ASSESS-DOCD LE1/YR: CPT | Mod: HCNC,CPTII,S$GLB, | Performed by: INTERNAL MEDICINE

## 2024-08-19 PROCEDURE — 1159F MED LIST DOCD IN RCRD: CPT | Mod: HCNC,CPTII,S$GLB, | Performed by: INTERNAL MEDICINE

## 2024-08-19 PROCEDURE — 1126F AMNT PAIN NOTED NONE PRSNT: CPT | Mod: HCNC,CPTII,S$GLB, | Performed by: INTERNAL MEDICINE

## 2024-08-19 PROCEDURE — 99213 OFFICE O/P EST LOW 20 MIN: CPT | Mod: 25,HCNC,S$GLB, | Performed by: INTERNAL MEDICINE

## 2024-08-19 PROCEDURE — 3074F SYST BP LT 130 MM HG: CPT | Mod: HCNC,CPTII,S$GLB, | Performed by: INTERNAL MEDICINE

## 2024-08-19 PROCEDURE — 99999 PR PBB SHADOW E&M-EST. PATIENT-LVL IV: CPT | Mod: PBBFAC,HCNC,, | Performed by: INTERNAL MEDICINE

## 2024-08-19 PROCEDURE — G0439 PPPS, SUBSEQ VISIT: HCPCS | Mod: HCNC,S$GLB,, | Performed by: INTERNAL MEDICINE

## 2024-08-19 PROCEDURE — 3078F DIAST BP <80 MM HG: CPT | Mod: HCNC,CPTII,S$GLB, | Performed by: INTERNAL MEDICINE

## 2024-08-19 NOTE — PROGRESS NOTES
Subjective:       Patient ID: Srinivasan Bernal III is a 79 y.o. male.  Chief Complaint: Annual Exam     HPI    Here for routine health maintenance.      HRA: patient feels overall is healthy.  Psychosocial and behavioral risks discussed.  BMI - 22  Diet - well balanced.   ADL: self sufficient in all  Instrumental ADL: patient is able to manage things like their medications and finances.    Memory or cognitive function - Patient has no issues with either   Ambulates normal. No recent falls.  Exercise - walks 2-3 x per week; some limitations from OA  Depression screening is negative.  Hearing--no deficits.  Vision - wears glasses   Incontinence - none    Preventative health needs discussed and patient was given a printed list of what they have received and what they will need with in the next 5-10 years.  Screening schedule reviewed with patient  Pneumovax - complete   Prevnar - complete   Shingrix -  complete   Flu vaccine -  complete       Advanced Care directive: Patient has advanced care directives  I have reviewed and updated the patient's current list of providers.       The patient is not currently on opioids              In addition to the patient's preventative review and discussion today, the patient also has other issues to discuss today with a separate summary of plan below:     C/o toe nail thickening.  Doesn't want to act    mild memory changes.  Mostly with names and phrases.  noticing more      LPR - ENT dx; on PPI and H2 blocker new    PND - controlled.       CAD s/p NEW stent 4/2023- on Brilinta; hx 2 previous stents. No chest pain.  Dr Hernandez.   HTN - controlled  HLD - controlled for goal < 70 (range has always been 68 - 78) - disease progressed.    ED - controlled with Viagra now  Takes advil pm to sleep occasionally  OA b/l knees and left ankle s/p fusion - relieved with otc ibuprofen about 2x/ wk      Send labs to Dr Hernandez    Assessment:       1. Routine physical examination    2. Mixed  hyperlipidemia    3. Essential hypertension    4. Memory change        Plan:       Routine physical examination    Mixed hyperlipidemia  -     Comprehensive Metabolic Panel; Future; Expected date: 02/15/2025  -     Lipid Panel; Future; Expected date: 02/15/2025    Essential hypertension  -     Comprehensive Metabolic Panel; Future; Expected date: 02/15/2025    Memory change  -     TSH; Future; Expected date: 02/15/2025  -     Vitamin B12; Future; Expected date: 02/19/2025          Wellness reviewed  Will get RSV at pharmacy           Discussed antifungal treatment.  He would like to wait.  Discussed normal memory decline vs developing mci.  Discussed neuropsych testing.  Would like to wait on that and do memory labs.  Continue current management and monitor.  Other diagnoses were reviewed and found stable and will continue to monitor.  Counseled on regular exercise, maintenance of a healthy weight, balanced diet rich in fruits/vegetables and lean protein, and avoidance of unhealthy habits like smoking and excessive alcohol intake.   Also, counseled on importance of being compliant with medication, health appointments, diet and exercise.     Follow up in about 6 months (around 2/26/2025).  MMSE next visit      Medication List with Changes/Refills   Current Medications    ASPIRIN (ECOTRIN) 81 MG EC TABLET    Take 81 mg by mouth once daily.    ATORVASTATIN (LIPITOR) 80 MG TABLET    Take 1 tablet (80 mg total) by mouth every evening.    EZETIMIBE (ZETIA) 10 MG TABLET    Take 1 tablet (10 mg total) by mouth once daily.    IBUPROFEN (ADVIL,MOTRIN) 200 MG TABLET    Take 200 mg by mouth every 6 (six) hours as needed for Pain.    PERINDOPRIL ERBUMINE (ACEON) 4 MG TABLET    Take 1 tablet (4 mg total) by mouth once daily.    TADALAFIL (CIALIS) 20 MG TAB    Take 1 tablet (20 mg total) by mouth once daily.   Discontinued Medications    TICAGRELOR (BRILINTA) 90 MG TABLET    Take 1 tablet twice a day by oral route for 90 days.  "      BP Readings from Last 3 Encounters:   08/19/24 108/66   02/19/24 106/64   10/04/23 111/70     No results found for: "HGBA1C"  No results found for: "TSH"  Lab Results   Component Value Date    LDLCALC 68.0 08/12/2024    LDLCALC 64.0 02/02/2024    LDLCALC 77.2 06/28/2023     Lab Results   Component Value Date    TRIG 50 08/12/2024    TRIG 70 02/02/2024    TRIG 54 06/28/2023     Wt Readings from Last 3 Encounters:   08/19/24 75.4 kg (166 lb 3.6 oz)   02/19/24 75.9 kg (167 lb 5.3 oz)   10/26/23 75.3 kg (166 lb)     Lab Results   Component Value Date    HGB 13.6 (L) 08/12/2024    HCT 40.9 08/12/2024    WBC 5.01 08/12/2024    ALT 14 08/12/2024    AST 16 08/12/2024     08/12/2024    K 4.1 08/12/2024    CREATININE 0.9 08/12/2024    PSA 0.90 01/04/2023           Review of Systems   Constitutional:  Negative for diaphoresis and fever.   HENT:  Negative for drooling and nosebleeds.    Eyes:  Negative for discharge and redness.   Respiratory:  Negative for apnea and choking.    Cardiovascular:  Negative for chest pain and palpitations.   Gastrointestinal:  Negative for abdominal pain and nausea.   Skin:  Negative for color change.   Neurological:  Negative for seizures and syncope.   Psychiatric/Behavioral:  Negative for behavioral problems.            Objective:      Vitals:    08/19/24 1033   BP: 108/66   Pulse: 68   Temp: 97.8 °F (36.6 °C)     Physical Exam  Vitals reviewed.   Eyes:      Conjunctiva/sclera: Conjunctivae normal.   Neck:      Thyroid: No thyromegaly.      Trachea: Trachea normal.   Cardiovascular:      Rate and Rhythm: Normal rate.      Comments: Edema negative  Pulmonary:      Effort: Pulmonary effort is normal.      Breath sounds: Normal breath sounds.   Abdominal:      General: Bowel sounds are normal.      Palpations: Abdomen is soft. There is no hepatomegaly.   Musculoskeletal:      Cervical back: Normal range of motion.   Skin:     General: Skin is warm and dry.   Neurological:      Mental " Status: He is alert.      Comments: DTR decreased bilateral   Psychiatric:      Comments: Alert and Oriented

## 2024-11-26 ENCOUNTER — HOSPITAL ENCOUNTER (OUTPATIENT)
Dept: RADIOLOGY | Facility: HOSPITAL | Age: 79
Discharge: HOME | End: 2024-11-26
Attending: INTERNAL MEDICINE
Payer: MEDICARE

## 2024-11-26 ENCOUNTER — TELEPHONE (OUTPATIENT)
Dept: FAMILY MEDICINE | Facility: CLINIC | Age: 79
End: 2024-11-26
Payer: MEDICARE

## 2024-11-26 ENCOUNTER — OFFICE VISIT (OUTPATIENT)
Dept: FAMILY MEDICINE | Facility: CLINIC | Age: 79
End: 2024-11-26
Payer: MEDICARE

## 2024-11-26 VITALS
OXYGEN SATURATION: 96 % | TEMPERATURE: 98 F | HEART RATE: 66 BPM | BODY MASS INDEX: 22.46 KG/M2 | DIASTOLIC BLOOD PRESSURE: 64 MMHG | HEIGHT: 72 IN | WEIGHT: 165.81 LBS | SYSTOLIC BLOOD PRESSURE: 104 MMHG

## 2024-11-26 DIAGNOSIS — M25.511 ACUTE PAIN OF RIGHT SHOULDER: ICD-10-CM

## 2024-11-26 DIAGNOSIS — M25.511 ACUTE PAIN OF RIGHT SHOULDER: Primary | ICD-10-CM

## 2024-11-26 DIAGNOSIS — M25.511 ACUTE PAIN OF BOTH SHOULDERS: Primary | ICD-10-CM

## 2024-11-26 DIAGNOSIS — M25.512 ACUTE PAIN OF BOTH SHOULDERS: Primary | ICD-10-CM

## 2024-11-26 PROCEDURE — 99214 OFFICE O/P EST MOD 30 MIN: CPT | Mod: HCNC,S$GLB,, | Performed by: INTERNAL MEDICINE

## 2024-11-26 PROCEDURE — 73030 X-RAY EXAM OF SHOULDER: CPT | Mod: TC,50,HCNC,FY,PO

## 2024-11-26 PROCEDURE — 73030 X-RAY EXAM OF SHOULDER: CPT | Mod: 26,50,HCNC, | Performed by: RADIOLOGY

## 2024-11-26 PROCEDURE — 1101F PT FALLS ASSESS-DOCD LE1/YR: CPT | Mod: HCNC,CPTII,S$GLB, | Performed by: INTERNAL MEDICINE

## 2024-11-26 PROCEDURE — 3074F SYST BP LT 130 MM HG: CPT | Mod: HCNC,CPTII,S$GLB, | Performed by: INTERNAL MEDICINE

## 2024-11-26 PROCEDURE — 3288F FALL RISK ASSESSMENT DOCD: CPT | Mod: HCNC,CPTII,S$GLB, | Performed by: INTERNAL MEDICINE

## 2024-11-26 PROCEDURE — 1159F MED LIST DOCD IN RCRD: CPT | Mod: HCNC,CPTII,S$GLB, | Performed by: INTERNAL MEDICINE

## 2024-11-26 PROCEDURE — 1125F AMNT PAIN NOTED PAIN PRSNT: CPT | Mod: HCNC,CPTII,S$GLB, | Performed by: INTERNAL MEDICINE

## 2024-11-26 PROCEDURE — 3078F DIAST BP <80 MM HG: CPT | Mod: HCNC,CPTII,S$GLB, | Performed by: INTERNAL MEDICINE

## 2024-11-26 PROCEDURE — 99999 PR PBB SHADOW E&M-EST. PATIENT-LVL III: CPT | Mod: PBBFAC,HCNC,, | Performed by: INTERNAL MEDICINE

## 2024-11-26 RX ORDER — NAPROXEN 500 MG/1
500 TABLET ORAL 2 TIMES DAILY PRN
Qty: 45 TABLET | Refills: 1 | Status: SHIPPED | OUTPATIENT
Start: 2024-11-26

## 2024-11-26 RX ORDER — FAMOTIDINE 40 MG/1
1 TABLET, FILM COATED ORAL DAILY
COMMUNITY

## 2024-11-26 RX ORDER — IPRATROPIUM BROMIDE 42 UG/1
SPRAY, METERED NASAL
COMMUNITY
Start: 2024-10-04

## 2024-11-26 RX ORDER — METHYLPREDNISOLONE 4 MG/1
TABLET ORAL
Qty: 21 TABLET | Refills: 0 | Status: SHIPPED | OUTPATIENT
Start: 2024-11-26 | End: 2025-11-26

## 2024-11-26 NOTE — PROGRESS NOTES
Subjective:       Patient ID: Srinivasan Bernal III is a 79 y.o. male.  Chief Complaint: Shoulder Pain     HPI    C/o right shoulder pain for 2 weeks ago.  Moderate constant pain.  Affects his sleep.  Advil and heating pad helps.  Reaching forward and down with arm makes it worse.  Not up.  Also in left shoulder, but much less.    Started a new shoulder weight routine for < 1 month.    Associated with occasional tingling numbness in his outer forearm and into his pinky finger on his right (not his right ring finger).    Xrays ordered earlier did not show acute pathology.        Other previous issues not addressed today:    mild memory changes.  Mostly with names and phrases.  noticing more      LPR - ENT dx; on PPI and H2 blocker new     PND - controlled.       CAD s/p NEW stent 4/2023- on Brilinta; hx 2 previous stents. No chest pain.  Dr Hernandez.   HTN - controlled  HLD - controlled for goal < 70 (range has always been 68 - 78) - disease progressed.    ED - controlled with Viagra now  Takes advil pm to sleep occasionally  OA b/l knees and left ankle s/p fusion - relieved with otc ibuprofen about 2x/ wk      Send labs to Dr Hernandez    Assessment:       1. Acute pain of both shoulders        Plan:       Acute pain of both shoulders  -     methylPREDNISolone (MEDROL DOSEPACK) 4 mg tablet; use as directed  Dispense: 21 tablet; Refill: 0  -     naproxen (NAPROSYN) 500 MG tablet; Take 1 tablet (500 mg total) by mouth 2 (two) times daily as needed.  Dispense: 45 tablet; Refill: 1            Rest for 1 week.  Avoid new shoulder routine.      Xrays ordered prior to apt today  He will let me know If above nw, PT +/- PMR/EMG          Medication List with Changes/Refills   Current Medications    ASPIRIN (ECOTRIN) 81 MG EC TABLET    Take 81 mg by mouth once daily.    ATORVASTATIN (LIPITOR) 80 MG TABLET    Take 1 tablet (80 mg total) by mouth every evening.    EZETIMIBE (ZETIA) 10 MG TABLET    Take 1 tablet (10 mg total) by  "mouth once daily.    FAMOTIDINE (PEPCID) 40 MG TABLET    Take 1 tablet by mouth once daily.    IBUPROFEN (ADVIL,MOTRIN) 200 MG TABLET    Take 200 mg by mouth every 6 (six) hours as needed for Pain.    IPRATROPIUM (ATROVENT) 42 MCG (0.06 %) NASAL SPRAY    SMARTSI-2 Spray(s) Both Nares 1-3 Times Daily    PERINDOPRIL ERBUMINE (ACEON) 4 MG TABLET    Take 1 tablet (4 mg total) by mouth once daily.    TADALAFIL (CIALIS) 20 MG TAB    Take 1 tablet (20 mg total) by mouth once daily.       BP Readings from Last 3 Encounters:   24 104/64   24 108/66   24 106/64     No results found for: "HGBA1C"  No results found for: "TSH"  Lab Results   Component Value Date    LDLCALC 68.0 2024    LDLCALC 64.0 2024    LDLCALC 77.2 2023     Lab Results   Component Value Date    TRIG 50 2024    TRIG 70 2024    TRIG 54 2023     Wt Readings from Last 3 Encounters:   24 75.2 kg (165 lb 12.6 oz)   24 75.4 kg (166 lb 3.6 oz)   24 75.9 kg (167 lb 5.3 oz)     Lab Results   Component Value Date    HGB 13.6 (L) 2024    HCT 40.9 2024    WBC 5.01 2024    ALT 14 2024    AST 16 2024     2024    K 4.1 2024    CREATININE 0.9 2024    PSA 0.90 2023           Review of Systems        Objective:      Vitals:    24 1509   BP: 104/64   Pulse: 66   Temp: 97.6 °F (36.4 °C)     Physical Exam  Musculoskeletal:      Comments: Full ROM b/l shoulders  Some tingling with one test for impingement.   B/l infraspinatus + TTP   Strength 5/5 b/l UE including external rotation               "

## 2024-12-02 DIAGNOSIS — I10 ESSENTIAL HYPERTENSION: ICD-10-CM

## 2024-12-02 NOTE — TELEPHONE ENCOUNTER
No care due was identified.  [facility] Embedded Care Due Messages. Reference number: [id number].   12/02/2024 5:33:12 PM CST

## 2024-12-03 RX ORDER — PERINDOPRIL ERBUMINE 4 MG/1
4 TABLET ORAL
Qty: 90 TABLET | Refills: 2 | Status: SHIPPED | OUTPATIENT
Start: 2024-12-03

## 2024-12-03 NOTE — TELEPHONE ENCOUNTER
Refill Decision Note   Srinivasan Bernal  is requesting a refill authorization.  Brief Assessment and Rationale for Refill:  Approve     Medication Therapy Plan:         Comments:     Note composed:1:25 PM 12/03/2024             Appointments     Last Visit   11/26/2024 Ray Anna MD   Next Visit   2/19/2025 Ray Anna MD

## 2025-01-09 ENCOUNTER — PATIENT MESSAGE (OUTPATIENT)
Dept: FAMILY MEDICINE | Facility: CLINIC | Age: 80
End: 2025-01-09
Payer: MEDICARE

## 2025-01-10 ENCOUNTER — OFFICE VISIT (OUTPATIENT)
Dept: FAMILY MEDICINE | Facility: CLINIC | Age: 80
End: 2025-01-10
Payer: MEDICARE

## 2025-01-10 VITALS
HEART RATE: 60 BPM | BODY MASS INDEX: 23.05 KG/M2 | SYSTOLIC BLOOD PRESSURE: 102 MMHG | WEIGHT: 170.19 LBS | DIASTOLIC BLOOD PRESSURE: 74 MMHG | OXYGEN SATURATION: 97 % | HEIGHT: 72 IN

## 2025-01-10 DIAGNOSIS — M25.511 ACUTE PAIN OF RIGHT SHOULDER: Primary | ICD-10-CM

## 2025-01-10 PROCEDURE — 99999 PR PBB SHADOW E&M-EST. PATIENT-LVL IV: CPT | Mod: PBBFAC,HCNC,, | Performed by: EMERGENCY MEDICINE

## 2025-01-10 NOTE — PROGRESS NOTES
Name: Srinivasan Bernal III  MRN: [id number]  : 1945  PCP: Ray Anna MD    Subjective   Srinivasan Brenal III is here today for continued evaluation of his right shoulder.  Mr. Garcia actually came in a few weeks ago and sore his regular primary care physician at that time he thinks he injured it while he was working out.  He was sent home with steroids and actually got some NSAIDs which have had minimal efficacy if any.  Patient continues to have burning and tingling down the outside of his arm to his fingers on the right side predominantly although initially the pain was also in his left shoulder that seems improved he comes back today because he is concerned that is not getting any better if anything they actually seems like it is getting worse.  Of note she did have an x-ray which showed his right shoulder was completely within normal limits.    Review of Systems   Constitutional: Negative.    HENT: Negative.     Eyes: Negative.    Respiratory: Negative.     Cardiovascular: Negative.    Gastrointestinal: Negative.    Endocrine: Negative.    Genitourinary: Negative.    Musculoskeletal: Negative.         Right shoulder pain radiating to the hand   Skin: Negative.    Allergic/Immunologic: Negative.    Neurological: Negative.    Hematological: Negative.    Psychiatric/Behavioral: Negative.     All other systems reviewed and are negative.    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   Constitutional:        General: He is not in acute distress.     Appearance: Normal appearance. He is well-developed.   HENT:      Head: Normocephalic and atraumatic.      Right Ear: External ear normal.      Left Ear: External ear normal.      Mouth/Throat:      Mouth: Mucous membranes are moist.   Eyes:      General: Lids are normal. Gaze aligned appropriately.      Extraocular Movements: Extraocular movements intact.      Conjunctiva/sclera: Conjunctivae normal.      Pupils: Pupils are equal, round, and reactive to light.   Cardiovascular:      Rate and Rhythm: Normal rate and regular rhythm.      Heart sounds: No murmur heard.     No friction rub. No gallop.   Pulmonary:      Effort: Pulmonary effort is normal. No respiratory distress.      Breath sounds: Normal breath sounds and air entry. No wheezing, rhonchi or rales.   Abdominal:      General: Abdomen is flat. There is no distension.   Musculoskeletal:         General: No swelling or deformity.      Cervical back: Full passive range of motion without pain, normal range of motion and neck supple.      Right lower leg: No edema.      Left lower leg: No edema.      Comments: Patient has good strength right shoulder both in abduction and adduction his bilateral upper strength is 5/5.  Patient does have point tenderness noted just medial to his right clavicular spine and increased pain with lateral rotation of his neck to the left and right especially to the left.   Skin:     General: Skin is warm and dry.      Coloration: Skin is not jaundiced.   Neurological:      General: No focal deficit present.      Mental Status: He is alert and oriented to person, place, and time. Mental status is at baseline.      GCS: GCS eye subscore is 4. GCS verbal subscore is 5. GCS motor subscore is 6.   Psychiatric:         Attention and Perception: Attention and perception normal.         Mood and Affect: Mood normal.         Speech: Speech normal.         Behavior: Behavior normal. Behavior is  cooperative.         Thought Content: Thought content normal.         Cognition and Memory: Cognition normal.         Judgment: Judgment normal.     Assessment & Plan   1. Acute pain of right shoulder  -     Ambulatory referral/consult to Pain Clinic; Future; Expected date: 01/17/2025    Patient did not have much benefit from a steroid pack nor over-the-counter or prescription NSAIDs.  We did discuss possibly using Aleve OTC as this would have less effect on his kidneys and possibly stomach also.  Patients pain / description actually may be originating more from his Cervical spine than his actual shoulder; advanced imaging would be helpful.  We have referred him to pain Clinic we are hoping that they can provide some guidance on physical therapy versus advanced imaging such as MRI.  We appreciate the help with this.  Mr. Mattson is aware of the fact that he can contact me at any time for any further help.Follow up PRN    Patient is encouraged to contact me at anytime via [email or web address] or my office for any needs.    Sal Hatfield MD  01/10/2025    DISCLAIMER: This note was prepared with [employer] Direct voice recognition transcription software. Garbled syntax, mangled pronouns, and other bizarre constructions may be attributed to that software system.  I attest to having reviewed and edited the generated note for accuracy, though some syntax or spelling errors may persist. Please contact the author of this note for any clarification.

## 2025-01-16 ENCOUNTER — OFFICE VISIT (OUTPATIENT)
Dept: PAIN MEDICINE | Facility: CLINIC | Age: 80
End: 2025-01-16
Payer: MEDICARE

## 2025-01-16 ENCOUNTER — HOSPITAL ENCOUNTER (OUTPATIENT)
Dept: RADIOLOGY | Facility: HOSPITAL | Age: 80
Discharge: HOME | End: 2025-01-16
Payer: MEDICARE

## 2025-01-16 VITALS
HEIGHT: 72 IN | WEIGHT: 169.44 LBS | HEART RATE: 70 BPM | SYSTOLIC BLOOD PRESSURE: 113 MMHG | DIASTOLIC BLOOD PRESSURE: 71 MMHG | BODY MASS INDEX: 22.95 KG/M2

## 2025-01-16 DIAGNOSIS — M54.12 CERVICAL RADICULOPATHY: Primary | ICD-10-CM

## 2025-01-16 DIAGNOSIS — M47.812 CERVICAL SPONDYLOSIS: ICD-10-CM

## 2025-01-16 DIAGNOSIS — M54.12 CERVICAL RADICULOPATHY: ICD-10-CM

## 2025-01-16 DIAGNOSIS — M25.511 ACUTE PAIN OF RIGHT SHOULDER: ICD-10-CM

## 2025-01-16 DIAGNOSIS — M54.2 CERVICALGIA: ICD-10-CM

## 2025-01-16 DIAGNOSIS — M50.30 DDD (DEGENERATIVE DISC DISEASE), CERVICAL: ICD-10-CM

## 2025-01-16 PROCEDURE — 99213 OFFICE O/P EST LOW 20 MIN: CPT | Mod: S$GLB,,,

## 2025-01-16 PROCEDURE — 72052 X-RAY EXAM NECK SPINE 6/>VWS: CPT | Mod: 26,HCNC,, | Performed by: RADIOLOGY

## 2025-01-16 PROCEDURE — 1159F MED LIST DOCD IN RCRD: CPT | Mod: CPTII,S$GLB,,

## 2025-01-16 PROCEDURE — 99999 PR PBB SHADOW E&M-EST. PATIENT-LVL IV: CPT | Mod: PBBFAC,HCNC,,

## 2025-01-16 PROCEDURE — 3074F SYST BP LT 130 MM HG: CPT | Mod: CPTII,S$GLB,,

## 2025-01-16 PROCEDURE — 1126F AMNT PAIN NOTED NONE PRSNT: CPT | Mod: CPTII,S$GLB,,

## 2025-01-16 PROCEDURE — 72052 X-RAY EXAM NECK SPINE 6/>VWS: CPT | Mod: TC,HCNC,PO

## 2025-01-16 PROCEDURE — 3078F DIAST BP <80 MM HG: CPT | Mod: CPTII,S$GLB,,

## 2025-01-16 PROCEDURE — 1101F PT FALLS ASSESS-DOCD LE1/YR: CPT | Mod: CPTII,S$GLB,,

## 2025-01-16 PROCEDURE — 3288F FALL RISK ASSESSMENT DOCD: CPT | Mod: CPTII,S$GLB,,

## 2025-01-16 NOTE — PROGRESS NOTES
[facility]      PCP:   Ray Anna MD    CC:   Chief Complaint   Patient presents with    Shoulder Pain          1/16/2025     3:22 PM 10/4/2023    11:04 AM   Last 3 PDI Scores   Pain Disability Index (PDI) 35 0     Interval HPI 1/16/2025: Srinivasan Bernal III returns to the office for follow up.  He returns for follow-up with worsening neck pain, 6/10, located in his neck with radiation down right arm into right hand, waxing and waning, with associated numbness and tingling.  He denies any weakness or any new changes to his bowel or bladder function.  He has been taking NSAIDs without relief.  He also received Medrol Dosepak at initial onset in November without relief.  He has been maintaining his at-home PT directed exercises without significant relief.    HPI:     Mr. Mai returns for follow up of back pain after PT.  Last seen right lower back and buttock pain that increased with walking and improved with sitting.  He compelded medrol taper and has been working with PT.  While PT has helped improve, but not resolve right lower back pain, he has developed weakness in the right quadriceps.  PT contacted me 5/29/23 about acute onset weakness in the right quadriceps.  Mr. Mai has noticed diffculty stepping up onto a curb because of the weakness.    He has some history of right knee welling since a replacement; he had an xray of the knee and is scheduled to see orthopedics soon for further assessment.     Initial HPI:  Srinivasan Bernal III is a 79 y.o. male with history of CAD presents with lower back pain.  He has had similar pain in 2007 and 2011 with benefit from PT in the past.  Current pain started 3/22/23 without triggering event.  He has pain in the right lower lumbar region/ buttock.  No radicular leg pain, numbness or tignling.  Pain is worse with walking and improves with sitting.  No pain with laying down.  Has tried ibuprofen.    He had MRI and xray in the past showing right  L5 pars deffect and anterolisthesis of L5 on S1.      Past and current medications:  Antineuropathics:  NSAIDs:  ibuprofen  Antidepressants:  Muscle relaxers:  Opioids:  Antiplatelets/Anticoagulants:  ASA  Others: completed medrol taper    Physical therapy/ Chiropractic care:  PT in the past and currently going with benefit    Pain Intervention History:  none    Past Spine Surgical History:  none      History:    Current Outpatient Medications:     aspirin (ECOTRIN) 81 MG EC tablet, Take 81 mg by mouth once daily., Disp: , Rfl:     atorvastatin (LIPITOR) 80 MG tablet, Take 1 tablet (80 mg total) by mouth every evening., Disp: 90 tablet, Rfl: 2    ezetimibe (ZETIA) 10 mg tablet, Take 1 tablet (10 mg total) by mouth once daily., Disp: 90 tablet, Rfl: 2    famotidine (PEPCID) 40 MG tablet, Take 1 tablet by mouth once daily., Disp: , Rfl:     ibuprofen (ADVIL,MOTRIN) 200 MG tablet, Take 200 mg by mouth every 6 (six) hours as needed for Pain., Disp: , Rfl:     ipratropium (ATROVENT) 42 mcg (0.06 %) nasal spray, SMARTSI-2 Spray(s) Both Nares 1-3 Times Daily, Disp: , Rfl:     methylPREDNISolone (MEDROL DOSEPACK) 4 mg tablet, use as directed (Patient not taking: Reported on 1/10/2025), Disp: 21 tablet, Rfl: 0    naproxen (NAPROSYN) 500 MG tablet, Take 1 tablet (500 mg total) by mouth 2 (two) times daily as needed., Disp: 45 tablet, Rfl: 1    perindopril erbumine (ACEON) 4 mg tablet, TAKE 1 TABLET EVERY DAY, Disp: 90 tablet, Rfl: 2    tadalafiL (CIALIS) 20 MG Tab, Take 1 tablet (20 mg total) by mouth once daily., Disp: 30 tablet, Rfl: 11    Past Medical History:   Diagnosis Date    Cataracts, bilateral     ,     Coronary artery disease     Hypercholesteremia     Kidney stone     Urerteroscopy    Skin cancer 2015    left ankle/ left ear       Past Surgical History:   Procedure Laterality Date    ANKLE FUSION      CIRCUMCISION      COLONOSCOPY N/A 2022    Procedure: COLONOSCOPY;  Surgeon: Natasha GARCIA  MD Kemal;  Location: [facility];  Service: Endoscopy;  Laterality: N/A;    FOOT SURGERY      HERNIA REPAIR Left 2008    KNEE ARTHROSCOPY Left 2010    KNEE ARTHROSCOPY Right 2010    KNEE SURGERY Left 2010    partial replacement    KNEE SURGERY Right 2012    replacement    MOUTH SURGERY      STENT, DRUG ELUTING, SINGLE VESSEL, CORONARY      TOE SURGERY      TONSILLECTOMY         Family History   Problem Relation Name Age of Onset    Heart disease Father      Dementia Mother         Social History     Socioeconomic History    Marital status:    Tobacco Use    Smoking status: Some Days     Types: Cigars    Smokeless tobacco: Never   Substance and Sexual Activity    Alcohol use: Yes     Comment: rarely    Drug use: No    Sexual activity: Yes     Social Drivers of Health     Financial Resource Strain: Low Risk  (2/16/2024)    Overall Financial Resource Strain (CARDIA)     Difficulty of Paying Living Expenses: Not hard at all   Food Insecurity: No Food Insecurity (2/16/2024)    Hunger Vital Sign     Worried About Running Out of Food in the Last Year: Never true     Ran Out of Food in the Last Year: Never true   Transportation Needs: No Transportation Needs (2/16/2024)    PRAPARE - Transportation     Lack of Transportation (Medical): No     Lack of Transportation (Non-Medical): No   Physical Activity: Sufficiently Active (2/16/2024)    Exercise Vital Sign     Days of Exercise per Week: 7 days     Minutes of Exercise per Session: 60 min   Stress: No Stress Concern Present (2/16/2024)    [employer] [place] [employer] - Occupational Stress Questionnaire     Feeling of Stress : Not at all   Housing Stability: Low Risk  (2/16/2024)    Housing Stability Vital Sign     Unable to Pay for Housing in the Last Year: No     Number of Places Lived in the Last Year: 2     Unstable Housing in the Last Year: No       Review of patient's allergies indicates:  No Known Allergies    Review of Systems:  Right low back/  buttock pain.  Balance of review of systems is negative.    Physical Exam:  Vitals:    01/16/25 1524   BP: 113/71   Pulse: 70   Weight: 76.9 kg (169 lb 6.8 oz)   Height: 6' (1.829 m)   PainSc: 0-No pain   PainLoc: Shoulder         Body mass index is 22.98 kg/m².      Gen: A and O x3, pleasant, well-groomed  Skin: No rashes or obvious lesions  HEENT: PERRLA, no obvious deformities on ears or in canals.Trachea midline.  CVS: Regular rate and rhythm, normal palpable pulses.  Resp: Clear to auscultation bilaterally, no wheezes or rales.  Abdomen: Soft, NT/ND.  Musculoskeletal: . No antalgic gait.     Neuro:  Motor:    Right Left   C4 Shoulder Abduction  5  5   C5 Elbow Flexion    5  5   C6 Wrist Extension  5  5   C7 Elbow Extension   5  5   C8/T1 Hand Intrinsics   5  5   C8 First Dorsal Interosseus  5  5   C8 Abductor Pollicus Brevis  5  5      Left  Right    Triceps DTR 2+ 2+   Biceps DTR 2+ 2+   Brachioradialis DTR 2+ 2+   Patellar DTR     Achilles DTR     Ramirez     Clonus     Babinski       Sensory: Intact and symmetrical to light touch and pinprick in C2-T1 dermatomes bilaterally.  Cervical spine: ROM is full in flexion, extension and lateral rotation without increased pain.  Spurling's maneuver causes no neck pain to either side.  Myofascial exam: No Tenderness to palpation across cervical paraspinous region bilaterally.      Imaging:    Xray lumbar spine report 11-:  multilevel lumbar sponylosis with subtle degenerative retrolisthesis of L1 on L2 and atnerolisthesis of L4 on L5.  Chronic compression deformity invlolving L1 superior endplate with very minimal loss of stature.    MRI lumbar spine report 5-:  multilevel spondylosis in the lumbar spine.  Severe facet degenerative arthropathy bilaterally at L4/5.  Severe DDD at L5/S1 and Grade 1 anterolisthesis of L5 on S1.  Right pars defect of L5 and possible left sided pars defect. No disc herniation at any level.    Xray knees 6-7-23:  The bones are  "osteopenic.  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  There is nonspecific lucency present beneath the distal right femoral component, and component loosening/infection cannot be excluded.  The left knee medial tibiofemoral hardware is anatomically aligned without complicating feature appreciated radiographically.  There is a moderate left knee joint effusion and/or synovial hypertrophy.  There is subcortical cyst formation observed no fracture, dislocation, or osseous destructive process appreciated radiographically.  There is extensive capsular calcification noted along the posterior margin of the right knee joint.  There is a 9.4 cm calcified density present overlying the left suprapatellar recess.  These findings could relate to CPPD/pyrophosphate arthropathy.  No acute fracture.    MRI lumbar spine 6-8-23:  multilevel degenerative chagnes.  L1 retrolisthesis on L2.  L2/3 retrolisthesis, facet hypertrophy, ligamentous hypertrophy with severe central canal narrowing and occlusion of the thecal sac.  L3/4 broad based disk with right greater than left facet hypertrophy with severe right foraminal narrowing and moderate central canal narrowing.  L4 anterolisthesis on L5 with broad based disk and moderate central canal narrowing.  L5/S1 degenerative disk dessication.     Labs:  No results found for: "LABA1C", "HGBA1C"    Lab Results   Component Value Date    WBC 5.01 08/12/2024    HGB 13.6 (L) 08/12/2024    HCT 40.9 08/12/2024    MCV 93 08/12/2024     08/12/2024           Problem List Items Addressed This Visit       Acute pain of right shoulder     Other Visit Diagnoses       Cervical radiculopathy    -  Primary    Relevant Orders    X-Ray Cervical Spine 5 View With Flex And Ext    Ambulatory Referral/Consult to Physical Therapy    Cervicalgia        Relevant Orders    MRI Cervical Spine Without Contrast    Cervical " spondylosis        DDD (degenerative disc disease), cervical                  Assessment:     Mr. Mai has known spondylolisthesis in the lumbar spine.  He has acute onset right buttock pain - myofascial vs referred from degeneratvie changes.  Acute onset weakness in the right hip flexors due to L2/3 severe central canal stenosis.  Still significant, but less stenosis at L3/4, L4/5.  Given weakness will refer to neurosurgery.  Will get dynamic xrays prior to neurosurgery visits.  Continue with orthopedic follow up as well.    1/16/2025: Srinivasan Bernal III returns to the office for follow up.  He returns for follow-up with worsening neck pain, 6/10, located in his neck with radiation down right arm into right hand, waxing and waning, with associated numbness and tingling.  He denies any weakness or any new changes to his bowel or bladder function.  He has been taking NSAIDs without relief.  He also received Medrol Dosepak at initial onset in November without relief.  He has been maintaining his at-home PT directed exercises without significant relief.    - on exam he has full strength in his upper extremities.  - x-ray of right shoulder does show some degenerative changes but I believe his pain is originating from his neck.  I believe he is having worsening cervical radicular pain.  This pain is limiting his mobility and interfering with his ADLs and quality of life.  Over the past 6 weeks he has completed at-home PT directed exercises, Medrol Dosepak and NSAIDs without significant relief of his pain.  - at this time I would like to order an x-ray of his cervical spine and MRI for further evaluation of his neuro anatomy.  - we will call him with results of imaging and future treatment plan.  - I have also placed orders for him to start formal physical therapy once we can get his pain under better control.      : Not applicable    This note was completed with dictation software and grammatical errors may  exist.

## 2025-01-17 ENCOUNTER — TELEPHONE (OUTPATIENT)
Dept: PAIN MEDICINE | Facility: CLINIC | Age: 80
End: 2025-01-17
Payer: MEDICARE

## 2025-01-17 NOTE — TELEPHONE ENCOUNTER
----- Message from [employer] sent at 1/11/2025 12:40 PM CST -----  Type:  Sooner Appointment Request     Patient is requesting a sooner appointment.  Patient declined first available appointment listed as well as another facility and provider .  Patient will not accept being placed on the waitlist and is requesting a message be sent to doctor.     Name of Caller:   Pt  When is the first available appointment?   none  Symptoms:   M25.511 (ICD-10-CM) - Acute pain of right shoulder  Would the patient rather a call back or a response via [facility]?   Call back  Best Call Back Number:   Telephone Information:  Mobile          [phone]      Additional Information:

## 2025-01-25 ENCOUNTER — HOSPITAL ENCOUNTER (OUTPATIENT)
Dept: RADIOLOGY | Facility: HOSPITAL | Age: 80
Discharge: HOME | End: 2025-01-25
Payer: MEDICARE

## 2025-01-25 DIAGNOSIS — M54.2 CERVICALGIA: ICD-10-CM

## 2025-01-25 PROCEDURE — 72141 MRI NECK SPINE W/O DYE: CPT | Mod: 26,,, | Performed by: RADIOLOGY

## 2025-01-25 PROCEDURE — 72141 MRI NECK SPINE W/O DYE: CPT | Mod: TC,PO

## 2025-01-28 ENCOUNTER — PATIENT MESSAGE (OUTPATIENT)
Dept: PAIN MEDICINE | Facility: CLINIC | Age: 80
End: 2025-01-28
Payer: MEDICARE

## 2025-01-29 NOTE — TELEPHONE ENCOUNTER
Yes, he does have significant narrowing throughout his cervical spine.  This is likely contributing to his pain.  If he is interested, we can consider cervical epidural to try and help alleviate his pain.  If interested please schedule him for the following procedure:    Physician - Dr Huggins    Type of Procedure/Injection - Cervical Epidural  C7/T1           Laterality - NA      Priority - Normal      Anxiolysis- RNIV      Need to hold medication - Yes      Aspirin for 4 days and NSAIDs for 2 days    None      Clearance needed - Yes      Follow up - 3 week

## 2025-01-31 ENCOUNTER — TELEPHONE (OUTPATIENT)
Dept: PAIN MEDICINE | Facility: CLINIC | Age: 80
End: 2025-01-31

## 2025-01-31 ENCOUNTER — OFFICE VISIT (OUTPATIENT)
Dept: PAIN MEDICINE | Facility: CLINIC | Age: 80
End: 2025-01-31
Payer: MEDICARE

## 2025-01-31 VITALS
WEIGHT: 169 LBS | BODY MASS INDEX: 22.89 KG/M2 | SYSTOLIC BLOOD PRESSURE: 112 MMHG | HEART RATE: 63 BPM | HEIGHT: 72 IN | DIASTOLIC BLOOD PRESSURE: 69 MMHG

## 2025-01-31 DIAGNOSIS — M47.812 CERVICAL SPONDYLOSIS: ICD-10-CM

## 2025-01-31 DIAGNOSIS — M54.12 CERVICAL RADICULOPATHY: Primary | ICD-10-CM

## 2025-01-31 DIAGNOSIS — M50.30 DDD (DEGENERATIVE DISC DISEASE), CERVICAL: ICD-10-CM

## 2025-01-31 DIAGNOSIS — M54.2 CERVICALGIA: ICD-10-CM

## 2025-01-31 PROCEDURE — 3078F DIAST BP <80 MM HG: CPT | Mod: CPTII,S$GLB,,

## 2025-01-31 PROCEDURE — 1125F AMNT PAIN NOTED PAIN PRSNT: CPT | Mod: CPTII,S$GLB,,

## 2025-01-31 PROCEDURE — 99214 OFFICE O/P EST MOD 30 MIN: CPT | Mod: S$GLB,,,

## 2025-01-31 PROCEDURE — 1101F PT FALLS ASSESS-DOCD LE1/YR: CPT | Mod: CPTII,S$GLB,,

## 2025-01-31 PROCEDURE — 3288F FALL RISK ASSESSMENT DOCD: CPT | Mod: CPTII,S$GLB,,

## 2025-01-31 PROCEDURE — 1159F MED LIST DOCD IN RCRD: CPT | Mod: CPTII,S$GLB,,

## 2025-01-31 PROCEDURE — 3074F SYST BP LT 130 MM HG: CPT | Mod: CPTII,S$GLB,,

## 2025-01-31 PROCEDURE — 99999 PR PBB SHADOW E&M-EST. PATIENT-LVL III: CPT | Mod: PBBFAC,,,

## 2025-01-31 NOTE — H&P (VIEW-ONLY)
[facility]      PCP:   Ray Anna MD    CC:   Chief Complaint   Patient presents with    Follow-up    Neck Pain    Arm Pain          1/31/2025     1:02 PM 1/16/2025     3:22 PM 10/4/2023    11:04 AM   Last 3 PDI Scores   Pain Disability Index (PDI) 10 35 0     Interval HPI 1/31/2025: Srinivasan Bernal III returns to the office for follow up.  He returns for follow-up for MRI review of his cervical spine.  He reports continued neck pain, 2-9/10, located in the right side of his neck with radiation down right arm.  Certain movements exacerbate his pain.  He reports associated numbness and tingling throughout right arm.  He denies any weakness in his upper extremities, no pain down left arm, no changes to his bowel or bladder function.  He is not finding relief with NSAIDs and oral Medrol Dosepak.      HPI:     Mr. Mia returns for follow up of back pain after PT.  Last seen right lower back and buttock pain that increased with walking and improved with sitting.  He compelded medrol taper and has been working with PT.  While PT has helped improve, but not resolve right lower back pain, he has developed weakness in the right quadriceps.  PT contacted me 5/29/23 about acute onset weakness in the right quadriceps.  Mr. Mai has noticed diffculty stepping up onto a curb because of the weakness.    He has some history of right knee welling since a replacement; he had an xray of the knee and is scheduled to see orthopedics soon for further assessment.     Initial HPI:  Srinivasan Bernal III is a 79 y.o. male with history of CAD presents with lower back pain.  He has had similar pain in 2007 and 2011 with benefit from PT in the past.  Current pain started 3/22/23 without triggering event.  He has pain in the right lower lumbar region/ buttock.  No radicular leg pain, numbness or tignling.  Pain is worse with walking and improves with sitting.  No pain with laying down.  Has tried ibuprofen.    He  had MRI and xray in the past showing right L5 pars deffect and anterolisthesis of L5 on S1.      Past and current medications:  Antineuropathics:  NSAIDs:  ibuprofen  Antidepressants:  Muscle relaxers:  Opioids:  Antiplatelets/Anticoagulants:  ASA  Others: completed medrol taper    Physical therapy/ Chiropractic care:  PT in the past and currently going with benefit    Pain Intervention History:  none    Past Spine Surgical History:  none      History:    Current Outpatient Medications:     aspirin (ECOTRIN) 81 MG EC tablet, Take 81 mg by mouth once daily., Disp: , Rfl:     atorvastatin (LIPITOR) 80 MG tablet, Take 1 tablet (80 mg total) by mouth every evening., Disp: 90 tablet, Rfl: 2    ezetimibe (ZETIA) 10 mg tablet, Take 1 tablet (10 mg total) by mouth once daily., Disp: 90 tablet, Rfl: 2    famotidine (PEPCID) 40 MG tablet, Take 1 tablet by mouth once daily., Disp: , Rfl:     ibuprofen (ADVIL,MOTRIN) 200 MG tablet, Take 200 mg by mouth every 6 (six) hours as needed for Pain., Disp: , Rfl:     ipratropium (ATROVENT) 42 mcg (0.06 %) nasal spray, SMARTSI-2 Spray(s) Both Nares 1-3 Times Daily, Disp: , Rfl:     methylPREDNISolone (MEDROL DOSEPACK) 4 mg tablet, use as directed (Patient not taking: Reported on 1/10/2025), Disp: 21 tablet, Rfl: 0    naproxen (NAPROSYN) 500 MG tablet, Take 1 tablet (500 mg total) by mouth 2 (two) times daily as needed., Disp: 45 tablet, Rfl: 1    perindopril erbumine (ACEON) 4 mg tablet, TAKE 1 TABLET EVERY DAY, Disp: 90 tablet, Rfl: 2    tadalafiL (CIALIS) 20 MG Tab, Take 1 tablet (20 mg total) by mouth once daily., Disp: 30 tablet, Rfl: 11    Past Medical History:   Diagnosis Date    Cataracts, bilateral     ,     Coronary artery disease 2004    Hypercholesteremia     Kidney stone     Urerteroscopy    Skin cancer 2015    left ankle/ left ear       Past Surgical History:   Procedure Laterality Date    ANKLE FUSION      CIRCUMCISION      COLONOSCOPY N/A 2022     Procedure: COLONOSCOPY;  Surgeon: Natasha Sommer MD;  Location: [facility];  Service: Endoscopy;  Laterality: N/A;    FOOT SURGERY      HERNIA REPAIR Left 2008    KNEE ARTHROSCOPY Left 2010    KNEE ARTHROSCOPY Right 2010    KNEE SURGERY Left 2010    partial replacement    KNEE SURGERY Right 2012    replacement    MOUTH SURGERY      STENT, DRUG ELUTING, SINGLE VESSEL, CORONARY      TOE SURGERY      TONSILLECTOMY         Family History   Problem Relation Name Age of Onset    Heart disease Father      Dementia Mother         Social History     Socioeconomic History    Marital status:    Tobacco Use    Smoking status: Some Days     Types: Cigars    Smokeless tobacco: Never   Substance and Sexual Activity    Alcohol use: Yes     Comment: rarely    Drug use: No    Sexual activity: Yes     Social Drivers of Health     Financial Resource Strain: Low Risk  (2/16/2024)    Overall Financial Resource Strain (CARDIA)     Difficulty of Paying Living Expenses: Not hard at all   Food Insecurity: No Food Insecurity (2/16/2024)    Hunger Vital Sign     Worried About Running Out of Food in the Last Year: Never true     Ran Out of Food in the Last Year: Never true   Transportation Needs: No Transportation Needs (2/16/2024)    PRAPARE - Transportation     Lack of Transportation (Medical): No     Lack of Transportation (Non-Medical): No   Physical Activity: Sufficiently Active (2/16/2024)    Exercise Vital Sign     Days of Exercise per Week: 7 days     Minutes of Exercise per Session: 60 min   Stress: No Stress Concern Present (2/16/2024)    [employer] - Occupational Stress Questionnaire     Feeling of Stress : Not at all   Housing Stability: Low Risk  (2/16/2024)    Housing Stability Vital Sign     Unable to Pay for Housing in the Last Year: No     Number of Places Lived in the Last Year: 2     Unstable Housing in the Last Year: No       Review of patient's allergies indicates:  No Known  Allergies    Review of Systems:  Right low back/ buttock pain.  Balance of review of systems is negative.    Physical Exam:  Vitals:    01/31/25 1303   BP: 112/69   Pulse: 63   Weight: 76.6 kg (168 lb 15.7 oz)   Height: 6' (1.829 m)   PainSc:   2   PainLoc: Neck         Body mass index is 22.92 kg/m².      Gen: A and O x3, pleasant, well-groomed  Skin: No rashes or obvious lesions  HEENT: PERRLA, no obvious deformities on ears or in canals.Trachea midline.  CVS: Regular rate and rhythm, normal palpable pulses.  Resp: Clear to auscultation bilaterally, no wheezes or rales.  Abdomen: Soft, NT/ND.  Musculoskeletal: . No antalgic gait.     Neuro:  Motor:    Right Left   C4 Shoulder Abduction  5  5   C5 Elbow Flexion    5  5   C6 Wrist Extension  5  5   C7 Elbow Extension   5  5   C8/T1 Hand Intrinsics   5  5   C8 First Dorsal Interosseus  5  5   C8 Abductor Pollicus Brevis  5  5      Left  Right    Triceps DTR 2+ 2+   Biceps DTR 2+ 2+   Brachioradialis DTR 2+ 2+   Patellar DTR     Achilles DTR     Ramirez     Clonus     Babinski       Sensory: Intact and symmetrical to light touch and pinprick in C2-T1 dermatomes bilaterally.  Cervical spine: ROM is full in flexion, extension and lateral rotation without increased pain.  Spurling's maneuver causes no neck pain to either side.  Myofascial exam: No Tenderness to palpation across cervical paraspinous region bilaterally.      Imaging:    Xray lumbar spine report 11-:  multilevel lumbar sponylosis with subtle degenerative retrolisthesis of L1 on L2 and atnerolisthesis of L4 on L5.  Chronic compression deformity invlolving L1 superior endplate with very minimal loss of stature.    MRI lumbar spine report 5-:  multilevel spondylosis in the lumbar spine.  Severe facet degenerative arthropathy bilaterally at L4/5.  Severe DDD at L5/S1 and Grade 1 anterolisthesis of L5 on S1.  Right pars defect of L5 and possible left sided pars defect. No disc herniation at any  level.    Xray knees 6-7-23:  The bones are osteopenic.  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  There is nonspecific lucency present beneath the distal right femoral component, and component loosening/infection cannot be excluded.  The left knee medial tibiofemoral hardware is anatomically aligned without complicating feature appreciated radiographically.  There is a moderate left knee joint effusion and/or synovial hypertrophy.  There is subcortical cyst formation observed no fracture, dislocation, or osseous destructive process appreciated radiographically.  There is extensive capsular calcification noted along the posterior margin of the right knee joint.  There is a 9.4 cm calcified density present overlying the left suprapatellar recess.  These findings could relate to CPPD/pyrophosphate arthropathy.  No acute fracture.    MRI lumbar spine 6-8-23:  multilevel degenerative chagnes.  L1 retrolisthesis on L2.  L2/3 retrolisthesis, facet hypertrophy, ligamentous hypertrophy with severe central canal narrowing and occlusion of the thecal sac.  L3/4 broad based disk with right greater than left facet hypertrophy with severe right foraminal narrowing and moderate central canal narrowing.  L4 anterolisthesis on L5 with broad based disk and moderate central canal narrowing.  L5/S1 degenerative disk dessication.     MRI cervical spine 01/25/2025  FINDINGS:  The visualized posterior fossa structures including the shanna, mid brain, and cerebellum are unremarkable.  No Chiari malformation.  The incidentally observed paranasal sinuses are clear.     The cervical vertebral bodies show normal height and signal intensity without evidence of acute compression fracture or pathologic marrow replacement process.  No spondylolisthesis.     There is reversal of the normal cervical lordosis which could relate to neck muscle spasm.  There is  degenerative disc desiccation at every cervical and visualized upper thoracic level.  There is anterior marginal osteophyte formation and degenerative disc space narrowing at C5-C6, C6-C7, and C7-T1.     The cervical spinal cord is normal in signal intensity without evidence of cord edema, myelomalacia, or cord syrinx.  No epidural fluid collections or masses.     The incidentally observed soft tissues of the neck show no significant abnormalities.     C2-C3: There is right facet arthropathy.  No disc protrusion or extrusion. No central canal stenosis or neuroforaminal stenosis.  C3-C4: There is bilateral hypertrophic degenerative facet arthropathy and uncovertebral spurring noted.  There is a posterior disc osteophyte complex and effacement of the anterior CSF sleeve.  There is severe bilateral neuroforaminal stenosis.  There is mild overall central canal stenosis.  C4-C5: There is bilateral uncovertebral spurring.  There is prominent left facet arthropathy, left greater than right.  There is severe left neuroforaminal stenosis.  There is, at most, mild right neuroforaminal stenosis.  There is effacement of the anterior CSF sleeve.  No central canal stenosis  C5-C6: There is a  posterior disc osteophyte complex and a superimposed broad right paracentral disc protrusion which exerts mass effect on the right paracentral ventral cervical spinal cord.  No abnormal cord signal.  There is bilateral facet arthropathy.  There is severe right neuroforaminal stenosis.  There is moderate-severe left neuroforaminal stenosis.  No overall central canal stenosis.  C6-C7: There is a broad left paracentral disc protrusion which indents the ventral cord.  No abnormal cord signal appreciated.  There is a possible 3 mm right foraminal disc protrusion visible on series 7, image 22.  This could produce symptoms referable to the right C7 nerve.  No left or right neuroforaminal stenosis.  No central canal stenosis.  There is mild  "prominence of the central canal of the spinal cord which measures up to 2 mm in AP dimension on series 7, image 22.  C7-T1: There is a bulging posterior disc osteophyte complex which effaces the anterior CSF sleeve and extends into both neural foramina.  There is severe bilateral neuroforaminal stenosis which could produce symptoms referable to the C8 nerves.  There is mild bilateral facet arthropathy.     Impression:     1. Multilevel degenerative change of the cervical spine resulting in multilevel neuroforaminal stenosis. there is severe bilateral neuroforaminal stenosis at C3-C4, severe left neuroforaminal stenosis at C4-C5, severe right neuroforaminal stenosis and moderate-severe left neuroforaminal stenosis at C5-C6, and severe bilateral neuroforaminal stenosis at C7-T1.  Please correlate clinically for symptoms referable to the left and right C4 nerves, left C5 nerve, bilateral C6 nerves, and bilateral C8 nerves.  2. Broad left paracentral disc protrusion at C6-C7 which indents the ventral cord.  3. Possible 3 mm right foraminal disc protrusion at C6-C7 which could produce symptoms referable to the right C7 nerve.  4. Broad right paracentral disc protrusion at C5-C6 which exerts mass effect on the right paracentral aspect of the ventral cervical cord.  5. Reversal of the normal cervical lordosis which could relate to neck muscle spasm.    Labs:  No results found for: "LABA1C", "HGBA1C"    Lab Results   Component Value Date    WBC 5.01 08/12/2024    HGB 13.6 (L) 08/12/2024    HCT 40.9 08/12/2024    MCV 93 08/12/2024     08/12/2024           Problem List Items Addressed This Visit    None  Visit Diagnoses       Cervical radiculopathy    -  Primary    Cervicalgia        Cervical spondylosis        DDD (degenerative disc disease), cervical                    Assessment:     Mr. Mai has known spondylolisthesis in the lumbar spine.  He has acute onset right buttock pain - myofascial vs referred from " degeneratvie changes.  Acute onset weakness in the right hip flexors due to L2/3 severe central canal stenosis.  Still significant, but less stenosis at L3/4, L4/5.  Given weakness will refer to neurosurgery.  Will get dynamic xrays prior to neurosurgery visits.  Continue with orthopedic follow up as well.    1/31/2025: Srinivasan Bernal III returns to the office for follow up.  He returns for follow-up for MRI review of his cervical spine.  He reports continued neck pain, 2-9/10, located in the right side of his neck with radiation down right arm.  Certain movements exacerbate his pain.  He reports associated numbness and tingling throughout right arm.  He denies any weakness in his upper extremities, no pain down left arm, no changes to his bowel or bladder function.  He is not finding relief with NSAIDs and oral Medrol Dosepak.    - on exam he has full strength in his upper extremities.  - we reviewed his cervical MRI in office today and is consistent with multilevel neuroforaminal stenosis. there is severe bilateral neuroforaminal stenosis at C3-C4, severe left neuroforaminal stenosis at C4-C5, severe right neuroforaminal stenosis and moderate-severe left neuroforaminal stenosis at C5-C6, and severe bilateral neuroforaminal stenosis at C7-T1.   - I believe he is having worsening cervical radicular pain.  This pain is limiting his mobility and interfering with his ADLs and quality of life.  He is not finding relief with oral Medrol Dosepak, NSAIDs.  Pain is too severe for him to fully participate in physical therapy.  However he would like to attend after her his pain is better under control, I have placed orders for this.  - for his severe cervical radicular pain I would like to schedule him for a C7/T1 interlaminar epidural steroid injection.  He will need to hold his aspirin for 7 days prior  - follow up 3 weeks post procedure with Dr. Huggins.      : Not applicable    This note was completed with dictation  software and grammatical errors may exist.

## 2025-01-31 NOTE — TELEPHONE ENCOUNTER
Please schedule patient for the following procedure:    Physician - Dr Huggins    Type of Procedure/Injection - Cervical Epidural  C7/T1           Laterality - NA      Priority - Normal      Anxiolysis- RNIV      Need to hold medication - Yes      Aspirin for 7 days and NSAIDs for 2 days    None      Clearance needed - Yes      Follow up - 3 week  With Dr. Huggins

## 2025-01-31 NOTE — PROGRESS NOTES
[facility]      PCP:   Ray Anna MD    CC:   Chief Complaint   Patient presents with    Follow-up    Neck Pain    Arm Pain          1/31/2025     1:02 PM 1/16/2025     3:22 PM 10/4/2023    11:04 AM   Last 3 PDI Scores   Pain Disability Index (PDI) 10 35 0     Interval HPI 1/31/2025: Srinivasan Bernal III returns to the office for follow up.  He returns for follow-up for MRI review of his cervical spine.  He reports continued neck pain, 2-9/10, located in the right side of his neck with radiation down right arm.  Certain movements exacerbate his pain.  He reports associated numbness and tingling throughout right arm.  He denies any weakness in his upper extremities, no pain down left arm, no changes to his bowel or bladder function.  He is not finding relief with NSAIDs and oral Medrol Dosepak.      HPI:     Mr. Mai returns for follow up of back pain after PT.  Last seen right lower back and buttock pain that increased with walking and improved with sitting.  He compelded medrol taper and has been working with PT.  While PT has helped improve, but not resolve right lower back pain, he has developed weakness in the right quadriceps.  PT contacted me 5/29/23 about acute onset weakness in the right quadriceps.  Mr. Mai has noticed diffculty stepping up onto a curb because of the weakness.    He has some history of right knee welling since a replacement; he had an xray of the knee and is scheduled to see orthopedics soon for further assessment.     Initial HPI:  Srinivasan Bernal III is a 79 y.o. male with history of CAD presents with lower back pain.  He has had similar pain in 2007 and 2011 with benefit from PT in the past.  Current pain started 3/22/23 without triggering event.  He has pain in the right lower lumbar region/ buttock.  No radicular leg pain, numbness or tignling.  Pain is worse with walking and improves with sitting.  No pain with laying down.  Has tried ibuprofen.    He  had MRI and xray in the past showing right L5 pars deffect and anterolisthesis of L5 on S1.      Past and current medications:  Antineuropathics:  NSAIDs:  ibuprofen  Antidepressants:  Muscle relaxers:  Opioids:  Antiplatelets/Anticoagulants:  ASA  Others: completed medrol taper    Physical therapy/ Chiropractic care:  PT in the past and currently going with benefit    Pain Intervention History:  none    Past Spine Surgical History:  none      History:    Current Outpatient Medications:     aspirin (ECOTRIN) 81 MG EC tablet, Take 81 mg by mouth once daily., Disp: , Rfl:     atorvastatin (LIPITOR) 80 MG tablet, Take 1 tablet (80 mg total) by mouth every evening., Disp: 90 tablet, Rfl: 2    ezetimibe (ZETIA) 10 mg tablet, Take 1 tablet (10 mg total) by mouth once daily., Disp: 90 tablet, Rfl: 2    famotidine (PEPCID) 40 MG tablet, Take 1 tablet by mouth once daily., Disp: , Rfl:     ibuprofen (ADVIL,MOTRIN) 200 MG tablet, Take 200 mg by mouth every 6 (six) hours as needed for Pain., Disp: , Rfl:     ipratropium (ATROVENT) 42 mcg (0.06 %) nasal spray, SMARTSI-2 Spray(s) Both Nares 1-3 Times Daily, Disp: , Rfl:     methylPREDNISolone (MEDROL DOSEPACK) 4 mg tablet, use as directed (Patient not taking: Reported on 1/10/2025), Disp: 21 tablet, Rfl: 0    naproxen (NAPROSYN) 500 MG tablet, Take 1 tablet (500 mg total) by mouth 2 (two) times daily as needed., Disp: 45 tablet, Rfl: 1    perindopril erbumine (ACEON) 4 mg tablet, TAKE 1 TABLET EVERY DAY, Disp: 90 tablet, Rfl: 2    tadalafiL (CIALIS) 20 MG Tab, Take 1 tablet (20 mg total) by mouth once daily., Disp: 30 tablet, Rfl: 11    Past Medical History:   Diagnosis Date    Cataracts, bilateral     ,     Coronary artery disease 2004    Hypercholesteremia     Kidney stone     Urerteroscopy    Skin cancer 2015    left ankle/ left ear       Past Surgical History:   Procedure Laterality Date    ANKLE FUSION      CIRCUMCISION      COLONOSCOPY N/A 2022     Procedure: COLONOSCOPY;  Surgeon: Natasha Sommer MD;  Location: [facility];  Service: Endoscopy;  Laterality: N/A;    FOOT SURGERY      HERNIA REPAIR Left 2008    KNEE ARTHROSCOPY Left 2010    KNEE ARTHROSCOPY Right 2010    KNEE SURGERY Left 2010    partial replacement    KNEE SURGERY Right 2012    replacement    MOUTH SURGERY      STENT, DRUG ELUTING, SINGLE VESSEL, CORONARY      TOE SURGERY      TONSILLECTOMY         Family History   Problem Relation Name Age of Onset    Heart disease Father      Dementia Mother         Social History     Socioeconomic History    Marital status:    Tobacco Use    Smoking status: Some Days     Types: Cigars    Smokeless tobacco: Never   Substance and Sexual Activity    Alcohol use: Yes     Comment: rarely    Drug use: No    Sexual activity: Yes     Social Drivers of Health     Financial Resource Strain: Low Risk  (2/16/2024)    Overall Financial Resource Strain (CARDIA)     Difficulty of Paying Living Expenses: Not hard at all   Food Insecurity: No Food Insecurity (2/16/2024)    Hunger Vital Sign     Worried About Running Out of Food in the Last Year: Never true     Ran Out of Food in the Last Year: Never true   Transportation Needs: No Transportation Needs (2/16/2024)    PRAPARE - Transportation     Lack of Transportation (Medical): No     Lack of Transportation (Non-Medical): No   Physical Activity: Sufficiently Active (2/16/2024)    Exercise Vital Sign     Days of Exercise per Week: 7 days     Minutes of Exercise per Session: 60 min   Stress: No Stress Concern Present (2/16/2024)    [employer] - Occupational Stress Questionnaire     Feeling of Stress : Not at all   Housing Stability: Low Risk  (2/16/2024)    Housing Stability Vital Sign     Unable to Pay for Housing in the Last Year: No     Number of Places Lived in the Last Year: 2     Unstable Housing in the Last Year: No       Review of patient's allergies indicates:  No Known  Allergies    Review of Systems:  Right low back/ buttock pain.  Balance of review of systems is negative.    Physical Exam:  Vitals:    01/31/25 1303   BP: 112/69   Pulse: 63   Weight: 76.6 kg (168 lb 15.7 oz)   Height: 6' (1.829 m)   PainSc:   2   PainLoc: Neck         Body mass index is 22.92 kg/m².      Gen: A and O x3, pleasant, well-groomed  Skin: No rashes or obvious lesions  HEENT: PERRLA, no obvious deformities on ears or in canals.Trachea midline.  CVS: Regular rate and rhythm, normal palpable pulses.  Resp: Clear to auscultation bilaterally, no wheezes or rales.  Abdomen: Soft, NT/ND.  Musculoskeletal: . No antalgic gait.     Neuro:  Motor:    Right Left   C4 Shoulder Abduction  5  5   C5 Elbow Flexion    5  5   C6 Wrist Extension  5  5   C7 Elbow Extension   5  5   C8/T1 Hand Intrinsics   5  5   C8 First Dorsal Interosseus  5  5   C8 Abductor Pollicus Brevis  5  5      Left  Right    Triceps DTR 2+ 2+   Biceps DTR 2+ 2+   Brachioradialis DTR 2+ 2+   Patellar DTR     Achilles DTR     Ramirez     Clonus     Babinski       Sensory: Intact and symmetrical to light touch and pinprick in C2-T1 dermatomes bilaterally.  Cervical spine: ROM is full in flexion, extension and lateral rotation without increased pain.  Spurling's maneuver causes no neck pain to either side.  Myofascial exam: No Tenderness to palpation across cervical paraspinous region bilaterally.      Imaging:    Xray lumbar spine report 11-:  multilevel lumbar sponylosis with subtle degenerative retrolisthesis of L1 on L2 and atnerolisthesis of L4 on L5.  Chronic compression deformity invlolving L1 superior endplate with very minimal loss of stature.    MRI lumbar spine report 5-:  multilevel spondylosis in the lumbar spine.  Severe facet degenerative arthropathy bilaterally at L4/5.  Severe DDD at L5/S1 and Grade 1 anterolisthesis of L5 on S1.  Right pars defect of L5 and possible left sided pars defect. No disc herniation at any  level.    Xray knees 6-7-23:  The bones are osteopenic.  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  There is nonspecific lucency present beneath the distal right femoral component, and component loosening/infection cannot be excluded.  The left knee medial tibiofemoral hardware is anatomically aligned without complicating feature appreciated radiographically.  There is a moderate left knee joint effusion and/or synovial hypertrophy.  There is subcortical cyst formation observed no fracture, dislocation, or osseous destructive process appreciated radiographically.  There is extensive capsular calcification noted along the posterior margin of the right knee joint.  There is a 9.4 cm calcified density present overlying the left suprapatellar recess.  These findings could relate to CPPD/pyrophosphate arthropathy.  No acute fracture.    MRI lumbar spine 6-8-23:  multilevel degenerative chagnes.  L1 retrolisthesis on L2.  L2/3 retrolisthesis, facet hypertrophy, ligamentous hypertrophy with severe central canal narrowing and occlusion of the thecal sac.  L3/4 broad based disk with right greater than left facet hypertrophy with severe right foraminal narrowing and moderate central canal narrowing.  L4 anterolisthesis on L5 with broad based disk and moderate central canal narrowing.  L5/S1 degenerative disk dessication.     MRI cervical spine 01/25/2025  FINDINGS:  The visualized posterior fossa structures including the shanna, mid brain, and cerebellum are unremarkable.  No Chiari malformation.  The incidentally observed paranasal sinuses are clear.     The cervical vertebral bodies show normal height and signal intensity without evidence of acute compression fracture or pathologic marrow replacement process.  No spondylolisthesis.     There is reversal of the normal cervical lordosis which could relate to neck muscle spasm.  There is  degenerative disc desiccation at every cervical and visualized upper thoracic level.  There is anterior marginal osteophyte formation and degenerative disc space narrowing at C5-C6, C6-C7, and C7-T1.     The cervical spinal cord is normal in signal intensity without evidence of cord edema, myelomalacia, or cord syrinx.  No epidural fluid collections or masses.     The incidentally observed soft tissues of the neck show no significant abnormalities.     C2-C3: There is right facet arthropathy.  No disc protrusion or extrusion. No central canal stenosis or neuroforaminal stenosis.  C3-C4: There is bilateral hypertrophic degenerative facet arthropathy and uncovertebral spurring noted.  There is a posterior disc osteophyte complex and effacement of the anterior CSF sleeve.  There is severe bilateral neuroforaminal stenosis.  There is mild overall central canal stenosis.  C4-C5: There is bilateral uncovertebral spurring.  There is prominent left facet arthropathy, left greater than right.  There is severe left neuroforaminal stenosis.  There is, at most, mild right neuroforaminal stenosis.  There is effacement of the anterior CSF sleeve.  No central canal stenosis  C5-C6: There is a  posterior disc osteophyte complex and a superimposed broad right paracentral disc protrusion which exerts mass effect on the right paracentral ventral cervical spinal cord.  No abnormal cord signal.  There is bilateral facet arthropathy.  There is severe right neuroforaminal stenosis.  There is moderate-severe left neuroforaminal stenosis.  No overall central canal stenosis.  C6-C7: There is a broad left paracentral disc protrusion which indents the ventral cord.  No abnormal cord signal appreciated.  There is a possible 3 mm right foraminal disc protrusion visible on series 7, image 22.  This could produce symptoms referable to the right C7 nerve.  No left or right neuroforaminal stenosis.  No central canal stenosis.  There is mild  "prominence of the central canal of the spinal cord which measures up to 2 mm in AP dimension on series 7, image 22.  C7-T1: There is a bulging posterior disc osteophyte complex which effaces the anterior CSF sleeve and extends into both neural foramina.  There is severe bilateral neuroforaminal stenosis which could produce symptoms referable to the C8 nerves.  There is mild bilateral facet arthropathy.     Impression:     1. Multilevel degenerative change of the cervical spine resulting in multilevel neuroforaminal stenosis. there is severe bilateral neuroforaminal stenosis at C3-C4, severe left neuroforaminal stenosis at C4-C5, severe right neuroforaminal stenosis and moderate-severe left neuroforaminal stenosis at C5-C6, and severe bilateral neuroforaminal stenosis at C7-T1.  Please correlate clinically for symptoms referable to the left and right C4 nerves, left C5 nerve, bilateral C6 nerves, and bilateral C8 nerves.  2. Broad left paracentral disc protrusion at C6-C7 which indents the ventral cord.  3. Possible 3 mm right foraminal disc protrusion at C6-C7 which could produce symptoms referable to the right C7 nerve.  4. Broad right paracentral disc protrusion at C5-C6 which exerts mass effect on the right paracentral aspect of the ventral cervical cord.  5. Reversal of the normal cervical lordosis which could relate to neck muscle spasm.    Labs:  No results found for: "LABA1C", "HGBA1C"    Lab Results   Component Value Date    WBC 5.01 08/12/2024    HGB 13.6 (L) 08/12/2024    HCT 40.9 08/12/2024    MCV 93 08/12/2024     08/12/2024           Problem List Items Addressed This Visit    None  Visit Diagnoses       Cervical radiculopathy    -  Primary    Cervicalgia        Cervical spondylosis        DDD (degenerative disc disease), cervical                    Assessment:     Mr. Mai has known spondylolisthesis in the lumbar spine.  He has acute onset right buttock pain - myofascial vs referred from " degeneratvie changes.  Acute onset weakness in the right hip flexors due to L2/3 severe central canal stenosis.  Still significant, but less stenosis at L3/4, L4/5.  Given weakness will refer to neurosurgery.  Will get dynamic xrays prior to neurosurgery visits.  Continue with orthopedic follow up as well.    1/31/2025: Srinivasan Bernal III returns to the office for follow up.  He returns for follow-up for MRI review of his cervical spine.  He reports continued neck pain, 2-9/10, located in the right side of his neck with radiation down right arm.  Certain movements exacerbate his pain.  He reports associated numbness and tingling throughout right arm.  He denies any weakness in his upper extremities, no pain down left arm, no changes to his bowel or bladder function.  He is not finding relief with NSAIDs and oral Medrol Dosepak.    - on exam he has full strength in his upper extremities.  - we reviewed his cervical MRI in office today and is consistent with multilevel neuroforaminal stenosis. there is severe bilateral neuroforaminal stenosis at C3-C4, severe left neuroforaminal stenosis at C4-C5, severe right neuroforaminal stenosis and moderate-severe left neuroforaminal stenosis at C5-C6, and severe bilateral neuroforaminal stenosis at C7-T1.   - I believe he is having worsening cervical radicular pain.  This pain is limiting his mobility and interfering with his ADLs and quality of life.  He is not finding relief with oral Medrol Dosepak, NSAIDs.  Pain is too severe for him to fully participate in physical therapy.  However he would like to attend after her his pain is better under control, I have placed orders for this.  - for his severe cervical radicular pain I would like to schedule him for a C7/T1 interlaminar epidural steroid injection.  He will need to hold his aspirin for 7 days prior  - follow up 3 weeks post procedure with Dr. Huggins.      : Not applicable    This note was completed with dictation  software and grammatical errors may exist.

## 2025-02-03 DIAGNOSIS — M54.12 CERVICAL RADICULOPATHY: Primary | ICD-10-CM

## 2025-02-03 RX ORDER — SODIUM CHLORIDE, SODIUM LACTATE, POTASSIUM CHLORIDE, CALCIUM CHLORIDE 600; 310; 30; 20 MG/100ML; MG/100ML; MG/100ML; MG/100ML
INJECTION, SOLUTION INTRAVENOUS CONTINUOUS
Status: CANCELLED | OUTPATIENT
Start: 2025-02-03

## 2025-02-03 NOTE — TELEPHONE ENCOUNTER
Spoke with patient and scheduled. Per provider patient to hold ASA x 7 and NSAIDs x 2 days prior. Office notified to fax clearance to Dr. Matthews. Pre op information given to patient and follow up appointment scheduled.

## 2025-02-04 ENCOUNTER — TELEPHONE (OUTPATIENT)
Dept: PAIN MEDICINE | Facility: CLINIC | Age: 80
End: 2025-02-04
Payer: MEDICARE

## 2025-02-04 NOTE — TELEPHONE ENCOUNTER
Spoke with pt to inform him that the clinic received the clearance for his procedure 2/26 with Dr. Huggins.

## 2025-02-05 ENCOUNTER — CLINICAL SUPPORT (OUTPATIENT)
Dept: REHABILITATION | Facility: HOSPITAL | Age: 80
End: 2025-02-05
Payer: MEDICARE

## 2025-02-05 DIAGNOSIS — R29.898 DECREASED ROM OF NECK: Primary | ICD-10-CM

## 2025-02-05 DIAGNOSIS — M54.12 CERVICAL RADICULOPATHY: ICD-10-CM

## 2025-02-05 PROCEDURE — 97161 PT EVAL LOW COMPLEX 20 MIN: CPT | Mod: HCNC,PO

## 2025-02-05 PROCEDURE — 97530 THERAPEUTIC ACTIVITIES: CPT | Mod: HCNC,PO

## 2025-02-06 NOTE — PROGRESS NOTES
"  Outpatient Rehab    Physical Therapy Evaluation    Patient Name: Sergei Bernal III  MRN: [id number]  YOB: 1945  Today's Date: 2/6/2025    Therapy Diagnosis:   Encounter Diagnoses   Name Primary?    Cervical radiculopathy     Decreased ROM of neck Yes     Physician: Héctor Cade PA-C    Physician Orders: Eval and Treat  Medical Diagnosis: Cervical radiculopathy     Visit # / Visits Authorized:  1 / 1   Date of Evaluation:  2/5/2025   Insurance Authorization Period: 1/16/2025 to 1/16/2026  Plan of Care Certification:  2/5/2025 to 4/2/2025      Time In: 1355   Time Out: 1445  Total Time: 50   Total Billable Time: 50         Subjective   History of Present Illness  Sergei is a 79 y.o. male who reports to physical therapy with a chief concern of neck pain. According to the patient's chart, Sergei has a past medical history of Cataracts, bilateral, Coronary artery disease, Hypercholesteremia, Kidney stone, and Skin cancer. Sergei has a past surgical history that includes Tonsillectomy; Toe Surgery; Foot surgery; Ankle Fusion; Hernia repair (Left, 2008); Knee arthroscopy (Left, 2010); Knee surgery (Left, 2010); Knee arthroscopy (Right, 2010); Knee surgery (Right, 2012); Mouth surgery; Circumcision; Colonoscopy (N/A, 09/20/2022); and stent, drug eluting, single vessel, coronary.    The patient reports a medical diagnosis of Cervical radiculopathy. The patient has experienced this issue since 01/16/25.   Diagnostic tests related to this condition: MRI studies.        History of Present Condition/Illness: 2 month onset of neck pain without ONEAL. Patient reports his MRI reveals "nerve compression." Patient has pain with getting out of bed, reaching overhead, tying shoes, and most ADLs. Pain originated in neck and radiates to right 5th digit. Denies radicular pain in LUE. Radicular sx are hard to track. Denies hx of neck surgeries.     Pain     Patient reports a current pain level of 2/10. Pain at best is reported " as 1/10. Pain at worst is reported as 9/10.   Location: neck and RUE  Clinical Progression (since onset): Worsening  Pain Qualities: Aching, Radiating, Needle-like  Pain-Relieving Factors: Rest, Other (Comment)  Other Pain-Relieving Factors: advil  Pain-Aggravating Factors: Lifting, Head movements, Movement           Past Medical History/Physical Systems Review:   Srinivasan Bernal III  has a past medical history of Cataracts, bilateral, Coronary artery disease, Hypercholesteremia, Kidney stone, and Skin cancer.    Srinivasan Bernal III  has a past surgical history that includes Tonsillectomy; Toe Surgery; Foot surgery; Ankle Fusion; Hernia repair (Left, 2008); Knee arthroscopy (Left, 2010); Knee surgery (Left, 2010); Knee arthroscopy (Right, 2010); Knee surgery (Right, 2012); Mouth surgery; Circumcision; Colonoscopy (N/A, 09/20/2022); and stent, drug eluting, single vessel, coronary.    Srinivasan has a current medication list which includes the following prescription(s): aspirin, atorvastatin, ezetimibe, famotidine, ibuprofen, ipratropium, methylprednisolone, naproxen, perindopril erbumine, and tadalafil.    Review of patient's allergies indicates:  No Known Allergies     Objective   Posture                 Upper crossed     Cervical Thoracic Sensation  Right Cervical/Thoracic Sensation  Intact: Light Touch       Left Cervical/Thoracic Sensation  Intact: Light Touch                Subcranial Range of Motion   Active Restricted? Passive Restricted? Pain   Flexion         Protraction         Retraction           Cervical Range of Motion   Active (deg) Passive (deg) Pain   Flexion 17   Yes   Extension 35   Yes   Right Lateral Flexion 20   Yes   Right Rotation 45   Yes   Left Lateral Flexion 15       Left Rotation 40                   Cervical Strength   Strength Pain   Flexion (C1/C2) 4     Extension 4     Right Lateral Flexion (C3) 4- Yes   Left Lateral Flexion (C3) 4     Right Rotation 4- Yes   Left Rotation 4          Shoulder Strength - Planes of Motion   Right Strength Right Pain Left Strength Left  Pain   Flexion 4 Yes 4     Extension 4   4     ABduction 4 Yes 4     ADduction 4   4     Horizontal ABduction           Horizontal ADduction           Internal Rotation 0° 4   4     Internal Rotation 90°           External Rotation 0° 4   4     External Rotation 90°               Shoulder Strength Details  HHD (first notch) #: right- 50     left-60    Elbow Strength   Right Strength Right Pain Left Strength Left  Pain   Flexion (C6) 4 Yes 4     Extension (C7) 4   4                    Cervical/Thoracic Special Tests            ULTT: left ulnar (neg) right ulnar (pos)               Intake Outcome Measure for FOTO Survey    Therapist reviewed FOTO scores for Sergei Bernal III on 2/5/2025.   FOTO report - see Media section or FOTO account episode details.     Intake Score: 14%    Treatment:                 Therapeutic Activity  Therapeutic Activity 1: home programming (UT/LS stretching and ulnar n glides)    Patient's spiritual, cultural, and educational needs considered and patient agreeable to plan of care and goals.     Assessment & Plan   Assessment  Sergei            Functional Limitations: Bed mobility, Carrying objects, Completing self-care activities, Completing work/school activities, Decreased ambulation distance/endurance, Getting off the floor, Functional mobility, Pain when reaching, Manipulating objects, Participating in leisure activities, Performing household chores, Range of motion, Proprioception, Reaching  Impairments: Abnormal muscle firing, Abnormal muscle tone, Activity intolerance, Impaired physical strength, Lack of appropriate home exercise program, Pain with functional activity, Safety issue, Abnormal or restricted range of motion  Personal Factors Affecting Prognosis: Pain    Patient Goal for Therapy (PT): to reduce RUE pain  Prognosis: Good  Assessment Details: Patient presents with s/sx consistent with  referring dx . See above for current functional limitations and impairments. Patient would benefit from skilled PT services to restore function and restore to PLOF.     Plan  From a physical therapy perspective, the patient would benefit from: Skilled Rehab Services    Planned therapy interventions include: Therapeutic exercise, Therapeutic activities, Neuromuscular re-education, Manual therapy, Aquatic therapy, and Other (Comment). Dry Needling  Planned modalities to include: Electrical stimulation - attended and Electrical stimulation - passive/unattended.        Visit Frequency: 2 times Per Week for 8 Weeks.       This plan was discussed with Patient.   Discussion participants: Agreed Upon Plan of Care             Goals:   Active       Long Term Goals (8 weeks)       Pain Goal       Start:  02/05/25    Expected End:  04/02/25       Pt will not experience greater than 2/10 pain at his worst, allowing him to return to his ADLs such as pickleball.          Nerve Goal       Start:  02/05/25    Expected End:  04/02/25       Pt will have no provocation of radicular symptoms with ULTT showing significant improvement in nerve mobility and allowing him to improve function in his hand.          ROM Goal       Start:  02/05/25    Expected End:  04/02/25       Pt will be able to achieve 45 degrees of cervical flexion, allowing him to restore normal ROM and to look down towards the ball while playing pickleball.             Short Term Goals (4 weeks)       Pain Goal       Start:  02/05/25    Expected End:  03/05/25       Pt to report a decrease in pain at its worse from 9/10 to <5/10 by 4 weeks which will allow pt to improve participation in ADLs.         Nerve Goal       Start:  02/05/25    Expected End:  03/05/25       Pt will have no radicular symptoms with ULTT testing until shoulder ER, which will show nerve mobility improvement from 45 degrees shoulder abduction only.          ROM Goal       Start:  02/05/25    Expected  End:  03/05/25       Pt will achieve 30 degrees of cervical flexion which will allow the pt to look towards his feet if required while ambulating improving his safety.              Bryan Bernabe, PT, DPT

## 2025-02-07 ENCOUNTER — CLINICAL SUPPORT (OUTPATIENT)
Dept: REHABILITATION | Facility: HOSPITAL | Age: 80
End: 2025-02-07
Payer: MEDICARE

## 2025-02-07 DIAGNOSIS — R29.898 DECREASED ROM OF NECK: Primary | ICD-10-CM

## 2025-02-07 DIAGNOSIS — M25.511 ACUTE PAIN OF RIGHT SHOULDER: ICD-10-CM

## 2025-02-07 PROCEDURE — 97140 MANUAL THERAPY 1/> REGIONS: CPT | Mod: HCNC,PO

## 2025-02-07 PROCEDURE — 97112 NEUROMUSCULAR REEDUCATION: CPT | Mod: HCNC,PO

## 2025-02-07 PROCEDURE — 97530 THERAPEUTIC ACTIVITIES: CPT | Mod: HCNC,PO

## 2025-02-07 NOTE — PROGRESS NOTES
Outpatient Rehab    Physical Therapy Visit    Patient Name: Sergei Bernal III  MRN: [id number]  YOB: 1945  Today's Date: 2/7/2025    Therapy Diagnosis:   Encounter Diagnoses   Name Primary?    Decreased ROM of neck Yes    Acute pain of right shoulder      Physician: Héctor Cade PA-C    Physician Orders: Eval and Treat       Physician Orders: Eval and Treat  Medical Diagnosis: Cervical radiculopathy      Visit # / Visits Authorized:  1 /20  Date of Evaluation:  2/5/2025   Insurance Authorization Period: 1/16/2025 to 1/16/2026  Plan of Care Certification:  2/5/2025 to 4/2/2025     Time In: 1400   Time Out: 1500  Total Time: 60   Total Billable Time: 40 (1:1 PT)         Subjective   Pt reported he has been doing his HEP but has had pain when he wakes up..         Objective            Treatment:  Therapeutic Exercise  Therapeutic Exercise Activity 1: Trigger Point Release w/lacrosse ball 5 min    Manual Therapy  Manual Therapy Activity 1: STM 10 min  Manual Therapy Activity 2: Sub-Occipital Release 3 min  Manual Therapy Activity 3: Gentle Cervical Traction 3 min    Balance/Neuromuscular Re-Education  Balance/Neuromuscular Re-Education Activity 1: Ulnar Nerve Glides 20x  Balance/Neuromuscular Re-Education Activity 2: L UT stretch 3x30s  Balance/Neuromuscular Re-Education Activity 3: L Levator Stretch 3x30s  Balance/Neuromuscular Re-Education Activity 4: Supine No Money's YTB over towel 3x10  Balance/Neuromuscular Re-Education Activity 5: Supine T's YTB over towel 3x10    Therapeutic Activity  Therapeutic Activity 1: Pt education (HEP, Fluid Intake, Sleep Positioning) 15 min    Patient's spiritual, cultural, and educational needs considered and patient agreeable to plan of care and goals.     Assessment & Plan   Assessment: Upon pt arrival to clinic, pt reported pain when he wakes up and sleep positioning was then discussed along with modifiying this to potentially allievate his symptoms in the morning. Pt  has increased his mobility with ulnar nerve glides since the initial visit and during UT/Levator stretching trigger points were identified. STM by therapist was then performed along with sub-occipital release and gentle cervical traction. Pt was then positioned supine with a towel under his thoracic spine and posterior cuff strengthening was performed. Education on fluid intake and adding exercises to his HEP was performed. Pt exhibits impairments such radicular symptoms into his hypothenar eminence and decrease posterior cuff strength. Continued emphasis on ulnar nerve glides, stretching UT and levator, and strengthening his posterior cuff will be beneficial to this pt. SPT was used as an extender.  Evaluation/Treatment Tolerance: Patient tolerated treatment well      Osman Duffy, Student Clinician    I, Bryan Bernabe, PT, DPT , certify that I was present in the room directing the students service delivery and guiding them using my clinical judgment. As the co-signing therapist, I have reviewed the student's documentation and take full responsibility for the treatment, assessment, and plan.    Bryan Bernabe PT, DPT    Plan: Pt would benefit from an emphasis on ulnar nerve glides, stretching UT and levator, and strengthening his posterior cuff.    Goals:   Active       Long Term Goals (8 weeks)       Pain Goal       Start:  02/05/25    Expected End:  04/02/25       Pt will not experience greater than 2/10 pain at his worst, allowing him to return to his ADLs such as pickleball.          Nerve Goal       Start:  02/05/25    Expected End:  04/02/25       Pt will have no provocation of radicular symptoms with ULTT showing significant improvement in nerve mobility and allowing him to improve function in his hand.          ROM Goal       Start:  02/05/25    Expected End:  04/02/25       Pt will be able to achieve 45 degrees of cervical flexion, allowing him to restore normal ROM and to look down towards the ball  while playing pickleball.             Short Term Goals (4 weeks)       Pain Goal       Start:  02/05/25    Expected End:  03/05/25       Pt to report a decrease in pain at its worse from 9/10 to <5/10 by 4 weeks which will allow pt to improve participation in ADLs.         Nerve Goal       Start:  02/05/25    Expected End:  03/05/25       Pt will have no radicular symptoms with ULTT testing until shoulder ER, which will show nerve mobility improvement from 45 degrees shoulder abduction only.          ROM Goal       Start:  02/05/25    Expected End:  03/05/25       Pt will achieve 30 degrees of cervical flexion which will allow the pt to look towards his feet if required while ambulating improving his safety.              Bryan Bernabe, PT, DPT

## 2025-02-11 ENCOUNTER — CLINICAL SUPPORT (OUTPATIENT)
Dept: REHABILITATION | Facility: HOSPITAL | Age: 80
End: 2025-02-11
Payer: MEDICARE

## 2025-02-11 DIAGNOSIS — R29.898 DECREASED ROM OF NECK: Primary | ICD-10-CM

## 2025-02-11 PROCEDURE — 20560 NDL INSJ W/O NJX 1 OR 2 MUSC: CPT | Mod: HCNC,PO,CG

## 2025-02-11 PROCEDURE — 97140 MANUAL THERAPY 1/> REGIONS: CPT | Mod: HCNC,PO

## 2025-02-11 PROCEDURE — 97112 NEUROMUSCULAR REEDUCATION: CPT | Mod: HCNC,PO

## 2025-02-11 PROCEDURE — 97010 HOT OR COLD PACKS THERAPY: CPT | Mod: HCNC,PO

## 2025-02-11 NOTE — PROGRESS NOTES
Outpatient Rehab    Physical Therapy Visit    Patient Name: Sergei Bernal III  MRN: [id number]  YOB: 1945  Today's Date: 2/11/2025    Therapy Diagnosis:   Encounter Diagnosis   Name Primary?    Decreased ROM of neck Yes     Physician: Héctor Cade PA-C    Physician Orders: Eval and Treat       Physician Orders: Eval and Treat  Medical Diagnosis: Cervical radiculopathy      Visit # / Visits Authorized:  2/20  Date of Evaluation:  2/5/2025   Insurance Authorization Period: 1/16/2025 to 1/16/2026  Plan of Care Certification:  2/5/2025 to 4/2/2025      Time In: 1100   Time Out: 1200  Total Time: 60   Total Billable Time: 30 min 1:1 PT    FOTO:  Intake Score:  %  Survey Score 1:  %  Survey Score 2:  %         Subjective   Tingling in L hand is about the same. He is scheduled to get an injection later this month..  Pain reported as 0/10. R hand    Objective            Treatment:  Therapeutic Exercise  Therapeutic Exercise Activity 1: Trigger Point Release w/lacrosse ball 5 min  Therapeutic Exercise Activity 2: UT/LS stretch (R only) 3x30s    Manual Therapy  Manual Therapy Activity 1: STM to c5/6/7 5 min  Manual Therapy Activity 2: Sub-Occipital Release 3 min  Manual Therapy Activity 3: Gentle Cervical Traction 3 min  Manual Therapy Activity 4: DN to R UT    Balance/Neuromuscular Re-Education  Balance/Neuromuscular Re-Education Activity 1: Ulnar Nerve Glides 20x (several rounds as test retest)  Balance/Neuromuscular Re-Education Activity 2: Seated cervical retraction 20x3s  Balance/Neuromuscular Re-Education Activity 3: Seated cervical retraction with L SB 20x3s  Balance/Neuromuscular Re-Education Activity 4: Supine No Money's YTB over towel 3x10  Balance/Neuromuscular Re-Education Activity 5: Supine T's YTB over towel 3x10         Assessment & Plan   Assessment: Discussed the purpose, mechanism, and indications for dry needling with Sergei . Patient was cleared of all precautions and contraindications and  pt signed written consent and gave verbal consent to dry needling Rx today.   Palpation used to determine dry needling sites. Increased tone noted at right UT. Pt rec'd dry needling in supine position to right UT with 0.30 mm needles.  Pt tolerated treatment well and was not in any distress at the completion of treatment.  Unable to indentify an intervention that reduced pain with nerve glides.  Evaluation/Treatment Tolerance: Patient tolerated treatment well    Patient will continue to benefit from skilled outpatient physical therapy to address the deficits listed in the problem list box on initial evaluation, provide pt/family education and to maximize pt's level of independence in the home and community environment.     Patient's spiritual, cultural, and educational needs considered and patient agreeable to plan of care and goals.           Plan: Pt would benefit from an emphasis on ulnar nerve glides, stretching UT and levator, and strengthening his posterior cuff.    Goals:   Active       Long Term Goals (8 weeks)       Pain Goal       Start:  02/05/25    Expected End:  04/02/25       Pt will not experience greater than 2/10 pain at his worst, allowing him to return to his ADLs such as pickleball.          Nerve Goal       Start:  02/05/25    Expected End:  04/02/25       Pt will have no provocation of radicular symptoms with ULTT showing significant improvement in nerve mobility and allowing him to improve function in his hand.          ROM Goal       Start:  02/05/25    Expected End:  04/02/25       Pt will be able to achieve 45 degrees of cervical flexion, allowing him to restore normal ROM and to look down towards the ball while playing pickleball.             Short Term Goals (4 weeks)       Pain Goal       Start:  02/05/25    Expected End:  03/05/25       Pt to report a decrease in pain at its worse from 9/10 to <5/10 by 4 weeks which will allow pt to improve participation in ADLs.         Nerve Goal        Start:  02/05/25    Expected End:  03/05/25       Pt will have no radicular symptoms with ULTT testing until shoulder ER, which will show nerve mobility improvement from 45 degrees shoulder abduction only.          ROM Goal       Start:  02/05/25    Expected End:  03/05/25       Pt will achieve 30 degrees of cervical flexion which will allow the pt to look towards his feet if required while ambulating improving his safety.              Bryan Bernabe, PT, DPT

## 2025-02-12 ENCOUNTER — LAB VISIT (OUTPATIENT)
Dept: LAB | Facility: HOSPITAL | Age: 80
End: 2025-02-12
Attending: INTERNAL MEDICINE
Payer: MEDICARE

## 2025-02-12 DIAGNOSIS — I10 ESSENTIAL HYPERTENSION: ICD-10-CM

## 2025-02-12 DIAGNOSIS — E78.2 MIXED HYPERLIPIDEMIA: ICD-10-CM

## 2025-02-12 DIAGNOSIS — R41.3 MEMORY CHANGE: ICD-10-CM

## 2025-02-12 LAB
ALBUMIN SERPL BCP-MCNC: 3.6 G/DL (ref 3.5–5.2)
ALP SERPL-CCNC: 89 U/L (ref 40–150)
ALT SERPL W/O P-5'-P-CCNC: 17 U/L (ref 10–44)
ANION GAP SERPL CALC-SCNC: 11 MMOL/L (ref 8–16)
AST SERPL-CCNC: 19 U/L (ref 10–40)
BILIRUB SERPL-MCNC: 0.9 MG/DL (ref 0.1–1)
BUN SERPL-MCNC: 13 MG/DL (ref 8–23)
CALCIUM SERPL-MCNC: 9.5 MG/DL (ref 8.7–10.5)
CHLORIDE SERPL-SCNC: 108 MMOL/L (ref 95–110)
CHOLEST SERPL-MCNC: 128 MG/DL (ref 120–199)
CHOLEST/HDLC SERPL: 2.6 {RATIO} (ref 2–5)
CO2 SERPL-SCNC: 22 MMOL/L (ref 23–29)
CREAT SERPL-MCNC: 0.8 MG/DL (ref 0.5–1.4)
EST. GFR  (NO RACE VARIABLE): >60 ML/MIN/1.73 M^2
GLUCOSE SERPL-MCNC: 85 MG/DL (ref 70–110)
HDLC SERPL-MCNC: 49 MG/DL (ref 40–75)
HDLC SERPL: 38.3 % (ref 20–50)
LDLC SERPL CALC-MCNC: 67.2 MG/DL (ref 63–159)
NONHDLC SERPL-MCNC: 79 MG/DL
POTASSIUM SERPL-SCNC: 4 MMOL/L (ref 3.5–5.1)
PROT SERPL-MCNC: 6 G/DL (ref 6–8.4)
SODIUM SERPL-SCNC: 141 MMOL/L (ref 136–145)
TRIGL SERPL-MCNC: 59 MG/DL (ref 30–150)
TSH SERPL DL<=0.005 MIU/L-ACNC: 1.34 UIU/ML (ref 0.4–4)
VIT B12 SERPL-MCNC: 700 PG/ML (ref 210–950)

## 2025-02-12 PROCEDURE — 36415 COLL VENOUS BLD VENIPUNCTURE: CPT | Mod: HCNC,PO | Performed by: INTERNAL MEDICINE

## 2025-02-12 PROCEDURE — 82607 VITAMIN B-12: CPT | Mod: HCNC | Performed by: INTERNAL MEDICINE

## 2025-02-12 PROCEDURE — 80053 COMPREHEN METABOLIC PANEL: CPT | Mod: HCNC | Performed by: INTERNAL MEDICINE

## 2025-02-12 PROCEDURE — 80061 LIPID PANEL: CPT | Mod: HCNC | Performed by: INTERNAL MEDICINE

## 2025-02-12 PROCEDURE — 84443 ASSAY THYROID STIM HORMONE: CPT | Mod: HCNC | Performed by: INTERNAL MEDICINE

## 2025-02-19 ENCOUNTER — OFFICE VISIT (OUTPATIENT)
Dept: FAMILY MEDICINE | Facility: CLINIC | Age: 80
End: 2025-02-19
Payer: MEDICARE

## 2025-02-19 VITALS
DIASTOLIC BLOOD PRESSURE: 62 MMHG | TEMPERATURE: 98 F | BODY MASS INDEX: 22.75 KG/M2 | HEIGHT: 72 IN | SYSTOLIC BLOOD PRESSURE: 100 MMHG | WEIGHT: 168 LBS | OXYGEN SATURATION: 96 % | HEART RATE: 65 BPM

## 2025-02-19 DIAGNOSIS — M54.12 CERVICAL RADICULOPATHY: ICD-10-CM

## 2025-02-19 DIAGNOSIS — I10 ESSENTIAL HYPERTENSION: Primary | ICD-10-CM

## 2025-02-19 DIAGNOSIS — I25.112 ATHEROSCLEROSIS OF NATIVE CORONARY ARTERY OF NATIVE HEART WITH REFRACTORY ANGINA PECTORIS: ICD-10-CM

## 2025-02-19 DIAGNOSIS — E78.2 MIXED HYPERLIPIDEMIA: ICD-10-CM

## 2025-02-19 DIAGNOSIS — Z79.899 ENCOUNTER FOR LONG-TERM (CURRENT) USE OF MEDICATIONS: ICD-10-CM

## 2025-02-19 NOTE — PROGRESS NOTES
Subjective:       Patient ID: Srinivasan Bernal III is a 79 y.o. male.  Chief Complaint: Hypertension     HPI    Cervical radiculopathy - improved with PT.  Has injection scheduled but doesn't feel needs it.       mild memory changes.  Mostly with names and phrases.  mmse 2/19/25  29/30     LPR - ENT dx; on PPI and H2 blocker new     PND - controlled.       CAD s/p NEW stent 4/2023- on Brilinta; hx 2 previous stents. No chest pain.  Dr Hernandez.   HTN - controlled  HLD - controlled for goal < 70 (range has always been 68 - 78) - disease progressed.    ED - controlled with Viagra now  Takes advil pm to sleep occasionally  OA b/l knees and left ankle s/p fusion - relieved with otc ibuprofen about 2x/ wk      Send labs to Dr Hernandez    Assessment:       1. Essential hypertension    2. Mixed hyperlipidemia    3. 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 Cervical radiculopathy    5. 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  Continue current management and monitor.  Other diagnoses were reviewed and found stable and will continue to monitor.  Counseled on regular exercise, maintenance of a healthy weight, balanced diet rich in fruits/vegetables and lean protein, and avoidance of unhealthy habits like smoking  "and excessive alcohol intake.   Also, counseled on importance of being compliant with medication, health appointments, diet and exercise.     Follow up in about 6 months (around 2025).      Medication List with Changes/Refills   Current Medications    ASPIRIN (ECOTRIN) 81 MG EC TABLET    Take 81 mg by mouth once daily.    ATORVASTATIN (LIPITOR) 80 MG TABLET    Take 1 tablet (80 mg total) by mouth every evening.    EZETIMIBE (ZETIA) 10 MG TABLET    Take 1 tablet (10 mg total) by mouth once daily.    FAMOTIDINE (PEPCID) 40 MG TABLET    Take 1 tablet by mouth once daily.    IBUPROFEN (ADVIL,MOTRIN) 200 MG TABLET    Take 200 mg by mouth every 6 (six) hours as needed for Pain.    IPRATROPIUM (ATROVENT) 42 MCG (0.06 %) NASAL SPRAY    SMARTSI-2 Spray(s) Both Nares 1-3 Times Daily    METHYLPREDNISOLONE (MEDROL DOSEPACK) 4 MG TABLET    use as directed    NAPROXEN (NAPROSYN) 500 MG TABLET    Take 1 tablet (500 mg total) by mouth 2 (two) times daily as needed.    PERINDOPRIL ERBUMINE (ACEON) 4 MG TABLET    TAKE 1 TABLET EVERY DAY    TADALAFIL (CIALIS) 20 MG TAB    Take 1 tablet (20 mg total) by mouth once daily.       BP Readings from Last 3 Encounters:   25 100/62   25 112/69   25 113/71     No results found for: "HGBA1C"  Lab Results   Component Value Date    TSH 1.345 2025     Lab Results   Component Value Date    LDLCALC 67.2 2025    LDLCALC 68.0 2024    LDLCALC 64.0 2024     Lab Results   Component Value Date    TRIG 59 2025    TRIG 50 2024    TRIG 70 2024     Wt Readings from Last 3 Encounters:   25 76.2 kg (167 lb 15.9 oz)   25 76.6 kg (168 lb 15.7 oz)   25 76.9 kg (169 lb 6.8 oz)     Lab Results   Component Value Date    HGB 13.6 (L) 2024    HCT 40.9 2024    WBC 5.01 2024    ALT 17 2025    AST 19 2025     2025    K 4.0 2025    CREATININE 0.8 2025    PSA 0.90 2023 "           Review of Systems        Objective:      Vitals:    02/19/25 1035   BP: 100/62   Pulse: 65   Temp: 98 °F (36.7 °C)     Physical Exam  Vitals reviewed.   Constitutional:       Appearance: Normal appearance.   Eyes:      Conjunctiva/sclera: Conjunctivae normal.   Cardiovascular:      Rate and Rhythm: Normal rate.   Pulmonary:      Effort: Pulmonary effort is normal.      Breath sounds: Normal breath sounds.   Musculoskeletal:      Cervical back: Normal range of motion.      Comments: Normal ROM bilateral    Skin:     General: Skin is warm and dry.   Neurological:      Mental Status: He is alert.      Cranial Nerves: Cranial nerve deficit: grossly intact.   Psychiatric:      Comments: Alert and orientated           Mmse TODAY 29/30

## 2025-02-20 ENCOUNTER — CLINICAL SUPPORT (OUTPATIENT)
Dept: REHABILITATION | Facility: HOSPITAL | Age: 80
End: 2025-02-20
Payer: MEDICARE

## 2025-02-20 DIAGNOSIS — R29.898 DECREASED ROM OF NECK: Primary | ICD-10-CM

## 2025-02-20 PROCEDURE — 97140 MANUAL THERAPY 1/> REGIONS: CPT | Mod: HCNC,PO

## 2025-02-20 PROCEDURE — 97110 THERAPEUTIC EXERCISES: CPT | Mod: HCNC,PO

## 2025-02-20 PROCEDURE — 97112 NEUROMUSCULAR REEDUCATION: CPT | Mod: HCNC,PO

## 2025-02-20 NOTE — PROGRESS NOTES
Outpatient Rehab    Physical Therapy Visit    Patient Name: Sergei Bernal III  MRN: [id number]  YOB: 1945  Today's Date: 2/20/2025    Therapy Diagnosis:   Encounter Diagnosis   Name Primary?    Decreased ROM of neck Yes     Physician: Héctor Cade, ANGELINA         Physician Orders: Eval and Treat  Medical Diagnosis: Cervical radiculopathy      Visit # / Visits Authorized:  1 /1, 3/20   Date of Evaluation:  2/5/2025   Insurance Authorization Period: 1/16/2025 to 1/16/2026  Plan of Care Certification:  2/5/2025 to 4/2/2025      Time In: 1300   Time Out: 1400  Total Time: 60   Total Billable Time: 60 (1:1 PT/SPT)             Subjective   Symptoms have significantly improved since last session..  Pain reported as 0/10. R hand    Objective            Treatment:  Therapeutic Exercise  Therapeutic Exercise Activity 1: Trigger Point Release w/lacrosse ball 5 min  Therapeutic Exercise Activity 2: UT/LS stretch (R only) 3x30s    Manual Therapy  Manual Therapy Activity 1: STM to c5/6/7 5 min  Manual Therapy Activity 2: Sub-Occipital Release 3 min  Manual Therapy Activity 3: Gentle Cervical Traction 3 min  Manual Therapy Activity 4: DN to R UT    Balance/Neuromuscular Re-Education  Balance/Neuromuscular Re-Education Activity 1: Ulnar Nerve Glides 20x (several rounds as test retest)  Balance/Neuromuscular Re-Education Activity 2: Seated cervical retraction 20x3s  Balance/Neuromuscular Re-Education Activity 3: Seated cervical retraction with L SB 20x3s  Balance/Neuromuscular Re-Education Activity 4: Supine No Money's YTB over towel 3x10  Balance/Neuromuscular Re-Education Activity 5: Supine T's YTB over towel 3x10         Assessment & Plan   Assessment: Continued with DN. Discussed the purpose, mechanism, and indications for dry needling with Sergei . Patient was cleared of all precautions and contraindications and pt signed written consent and gave verbal consent to dry needling Rx today.   Palpation used to  determine dry needling sites. Increased tone noted at right UT. Pt rec'd dry needling in supine position to right UT with 0.30 mm needles.  Pt tolerated treatment well and was not in any distress at the completion of treatment.  Unable to indentify an intervention that reduced pain with nerve glides.  Evaluation/Treatment Tolerance: Patient tolerated treatment well    Patient will continue to benefit from skilled outpatient physical therapy to address the deficits listed in the problem list box on initial evaluation, provide pt/family education and to maximize pt's level of independence in the home and community environment.     Patient's spiritual, cultural, and educational needs considered and patient agreeable to plan of care and goals.           Plan: Pt would benefit from an emphasis on ulnar nerve glides, stretching UT and levator, and strengthening his posterior cuff.    Goals:   Active       Long Term Goals (8 weeks)       Pain Goal       Start:  02/05/25    Expected End:  04/02/25       Pt will not experience greater than 2/10 pain at his worst, allowing him to return to his ADLs such as pickleball.          Nerve Goal       Start:  02/05/25    Expected End:  04/02/25       Pt will have no provocation of radicular symptoms with ULTT showing significant improvement in nerve mobility and allowing him to improve function in his hand.          ROM Goal       Start:  02/05/25    Expected End:  04/02/25       Pt will be able to achieve 45 degrees of cervical flexion, allowing him to restore normal ROM and to look down towards the ball while playing pickleball.             Short Term Goals (4 weeks)       Pain Goal       Start:  02/05/25    Expected End:  03/05/25       Pt to report a decrease in pain at its worse from 9/10 to <5/10 by 4 weeks which will allow pt to improve participation in ADLs.         Nerve Goal       Start:  02/05/25    Expected End:  03/05/25       Pt will have no radicular symptoms with ULTT  testing until shoulder ER, which will show nerve mobility improvement from 45 degrees shoulder abduction only.          ROM Goal       Start:  02/05/25    Expected End:  03/05/25       Pt will achieve 30 degrees of cervical flexion which will allow the pt to look towards his feet if required while ambulating improving his safety.              Bryan Bernabe, PT, DPT

## 2025-02-24 DIAGNOSIS — Z00.00 ENCOUNTER FOR MEDICARE ANNUAL WELLNESS EXAM: ICD-10-CM

## 2025-02-26 ENCOUNTER — HOSPITAL ENCOUNTER (OUTPATIENT)
Dept: RADIOLOGY | Facility: HOSPITAL | Age: 80
Discharge: HOME | End: 2025-02-26
Attending: ANESTHESIOLOGY | Admitting: ANESTHESIOLOGY
Payer: MEDICARE

## 2025-02-26 ENCOUNTER — HOSPITAL ENCOUNTER (OUTPATIENT)
Facility: HOSPITAL | Age: 80
Discharge: HOME | End: 2025-02-26
Attending: ANESTHESIOLOGY | Admitting: ANESTHESIOLOGY
Payer: MEDICARE

## 2025-02-26 VITALS
OXYGEN SATURATION: 98 % | DIASTOLIC BLOOD PRESSURE: 68 MMHG | TEMPERATURE: 98 F | SYSTOLIC BLOOD PRESSURE: 120 MMHG | HEIGHT: 72 IN | RESPIRATION RATE: 16 BRPM | WEIGHT: 167 LBS | HEART RATE: 74 BPM | BODY MASS INDEX: 22.62 KG/M2

## 2025-02-26 DIAGNOSIS — M54.2 NECK PAIN: ICD-10-CM

## 2025-02-26 DIAGNOSIS — M54.12 CERVICAL RADICULOPATHY: Primary | ICD-10-CM

## 2025-02-26 PROCEDURE — 62321 NJX INTERLAMINAR CRV/THRC: CPT | Mod: HCNC,PO | Performed by: ANESTHESIOLOGY

## 2025-02-26 PROCEDURE — 63600175 PHARM REV CODE 636 W HCPCS: Mod: HCNC,PO | Performed by: ANESTHESIOLOGY

## 2025-02-26 PROCEDURE — 25500020 PHARM REV CODE 255: Mod: HCNC,PO | Performed by: ANESTHESIOLOGY

## 2025-02-26 PROCEDURE — 25000003 PHARM REV CODE 250: Mod: HCNC,PO | Performed by: ANESTHESIOLOGY

## 2025-02-26 PROCEDURE — 62321 NJX INTERLAMINAR CRV/THRC: CPT | Mod: HCNC,,, | Performed by: ANESTHESIOLOGY

## 2025-02-26 RX ORDER — MIDAZOLAM HYDROCHLORIDE 1 MG/ML
INJECTION INTRAMUSCULAR; INTRAVENOUS
Status: DISCONTINUED | OUTPATIENT
Start: 2025-02-26 | End: 2025-02-26 | Stop reason: HOSPADM

## 2025-02-26 RX ORDER — SODIUM CHLORIDE, SODIUM LACTATE, POTASSIUM CHLORIDE, CALCIUM CHLORIDE 600; 310; 30; 20 MG/100ML; MG/100ML; MG/100ML; MG/100ML
INJECTION, SOLUTION INTRAVENOUS CONTINUOUS
Status: DISCONTINUED | OUTPATIENT
Start: 2025-02-26 | End: 2025-02-26

## 2025-02-26 RX ORDER — SODIUM CHLORIDE 9 MG/ML
INJECTION, SOLUTION INTRAVENOUS CONTINUOUS
Status: DISCONTINUED | OUTPATIENT
Start: 2025-02-26 | End: 2025-02-26 | Stop reason: HOSPADM

## 2025-02-26 RX ORDER — DEXAMETHASONE SODIUM PHOSPHATE 10 MG/ML
INJECTION, SOLUTION INTRA-ARTICULAR; INTRALESIONAL; INTRAMUSCULAR; INTRAVENOUS; SOFT TISSUE
Status: DISCONTINUED | OUTPATIENT
Start: 2025-02-26 | End: 2025-02-26 | Stop reason: HOSPADM

## 2025-02-26 RX ORDER — LIDOCAINE HYDROCHLORIDE 10 MG/ML
INJECTION, SOLUTION EPIDURAL; INFILTRATION; INTRACAUDAL; PERINEURAL
Status: DISCONTINUED | OUTPATIENT
Start: 2025-02-26 | End: 2025-02-26 | Stop reason: HOSPADM

## 2025-02-26 RX ADMIN — SODIUM CHLORIDE: 9 INJECTION, SOLUTION INTRAVENOUS at 01:02

## 2025-02-26 NOTE — INTERVAL H&P NOTE
The patient has been examined and the H&P has been reviewed:    I concur with the findings and no changes have occurred since H&P was written.  He has held aspirin appropriately.  The risks and benefits of this intervention, and alternative therapies were discussed with the patient.  The discussion of risks included infection, bleeding, need for additional procedures or surgery, nerve damage.  Questions regarding the procedure, risks, expected outcome, and possible side effects were solicited and answered to the patient's satisfaction.  Sergei Bernal wishes to proceed with the injection or procedure.  Written consent was obtained.    There are no hospital problems to display for this patient.

## 2025-02-26 NOTE — DISCHARGE SUMMARY
[facility]  Discharge Note  Short Stay    Admit Date: 2/26/2025    Discharge Date: 2/26/2025    Attending Physician: Jordin Huggins     Discharge Provider: Jordin Huggins    Diagnoses:  There are no hospital problems to display for this patient.      Discharged Condition: Good    Final Diagnoses: Cervical radiculopathy [M54.12]    Disposition: Home or Self Care    Hospital Course: No complications, uneventful    Outcome of Hospitalization, Treatment, Procedure, or Surgery:  Patient was admitted for outpatient interventional pain management procedure. The patient tolerated the procedure well with no complications.    Follow up/Patient Instructions:  Follow up as scheduled in Pain Management office in 2-3 weeks.  Patient has received instructions and follow up date and time.    Medications:  Continue previous medications,except restart aspirin in 24 hours    Discharge Procedure Orders   Notify your health care provider if you experience any of the following:  temperature >100.4     Notify your health care provider if you experience any of the following:  persistent nausea and vomiting or diarrhea     Notify your health care provider if you experience any of the following:  severe uncontrolled pain     Notify your health care provider if you experience any of the following:  redness, tenderness, or signs of infection (pain, swelling, redness, odor or green/yellow discharge around incision site)     Notify your health care provider if you experience any of the following:  difficulty breathing or increased cough     Notify your health care provider if you experience any of the following:  severe persistent headache     Notify your health care provider if you experience any of the following:  worsening rash     Notify your health care provider if you experience any of the following:  persistent dizziness, light-headedness, or visual disturbances     Notify your health care provider if you experience any of the  following:  increased confusion or weakness     Activity as tolerated

## 2025-02-26 NOTE — OP NOTE
"Procedure Note    Procedure Date: 2/26/2025    Procedure Performed:  C7-T1 cervical interlaminar epidural steroid injection under fluoroscopy.    Indications:  Srinivasan Bernal III presents with cervical radiculitis/radiculopathy secondary to disc herniation, osteophyte/osteophyte complexes, and/or severe degenerative disc disease producing foraminal or central spinal stenosis.  The pain has been present for at least 4 weeks and the patient has failed to respond to noninvasive conservative care.  Pain rated by NRS at baseline prior to intervention is 6/10.  Their radiculitis/radiculopathy and/or neurogenic claudication is severe enough to greatly impact their quality of life or function.      Pre-op diagnosis: Cervical Radiculitis/Radiculopathy    Post-op diagnosis: same    Physician: Jordin Huggins MD    IV anxiolysis medications: versed 2mg    Medications injected: Dexamethasone 15mg, 3.5 mL sterile, preservative-free normal saline.    Local anesthetic used: 1% Lidocaine, 1 ml    Estimated Blood Loss: none    Complications:  none    Technique:  The patient was interviewed in the holding area and Risks/Benefits were discussed, including, but not limited to, the possibility of new or different pain, bleeding or infection.   All questions were answered.  The patient understood and accepted risks.  Consent was verfied.  A time-out was taken to identify patient and procedure prior to starting the procedure.  With the patient laying in a prone position with the neck in a mid-flexed forward position, the area was prepped and draped in the usual sterile fashion using ChloraPrep x2 and a fenestrated drape.  The area was determined under AP fluoroscopic guidance.  The skin and subcutaneous tissues overlying the targeted interspace were anesthetized with 3-5 mL of 1% Lidocaine using a 25G 1.5" needle.  A 20G, 3.5" Tuohy epidural needle was inserted through the anesthetized skin and directed toward the interspace under " fluoroscopic guidance until T1 lamina was contacted.  The fluoroscope was then adjusted to yield a contralateral oblique view of the C7-T1 interspace.  The epidural needle was incrementally walked cephalad off of the lamina until the ligamentum flavum was engaged. From this point, a loss-of-resistance technique was used to identify entrance of the needle into the epidural space.  Once the tip of the needle was in the desired position, the contrast dye Omnipaque was injected to determine placement and no vascular uptake.  Then, after negative aspiration, dexamethasone 15 mg + 3.5 cc NS was injected.  The needle was flushed with normal saline and removed. The contrast was seen to be displaced after injection. Patient was awake/responsive during all injections.  The patient tolerated the procedure well and was transferred to the [facility] in stable condition.  The patient was monitored after the procedure and was given post-procedure and discharge instructions to follow at home. The patient was discharged in a stable condition.

## 2025-03-06 ENCOUNTER — CLINICAL SUPPORT (OUTPATIENT)
Dept: REHABILITATION | Facility: HOSPITAL | Age: 80
End: 2025-03-06
Payer: MEDICARE

## 2025-03-06 DIAGNOSIS — M25.511 ACUTE PAIN OF RIGHT SHOULDER: ICD-10-CM

## 2025-03-06 DIAGNOSIS — R29.898 DECREASED ROM OF NECK: Primary | ICD-10-CM

## 2025-03-06 PROCEDURE — 97112 NEUROMUSCULAR REEDUCATION: CPT | Mod: HCNC,PO

## 2025-03-06 PROCEDURE — 97110 THERAPEUTIC EXERCISES: CPT | Mod: HCNC,PO

## 2025-03-06 PROCEDURE — 97140 MANUAL THERAPY 1/> REGIONS: CPT | Mod: HCNC,PO

## 2025-03-06 NOTE — PROGRESS NOTES
Outpatient Rehab    Physical Therapy Visit    Patient Name: Sergei Bernal III  MRN: [id number]  YOB: 1945  Encounter Date: 3/6/2025    Therapy Diagnosis:   Encounter Diagnoses   Name Primary?    Decreased ROM of neck Yes    Acute pain of right shoulder      Physician: Héctor Cade, ANGELINA     Physician Orders: Eval and Treat  Medical Diagnosis: Cervical radiculopathy      Visit # / Visits Authorized:  1 / 1 4/20  Date of Evaluation:  2/5/2025   Insurance Authorization Period: 1/16/2025 to 1/16/2026  Plan of Care Certification:  2/5/2025 to 4/2/2025      Time In: 1305   Time Out: 1405  Total Time: 60   Total Billable Time: 40 (1:1 PT)    FOTO:  Intake Score: 14%  Survey Score 1: 16%  Survey Score 2:  %         Subjective   Shoulder pain has been abolished since starting therapy, numbness/tingling in RUE is intermittent.  Pain reported as 0/10.      Objective      Subcranial Range of Motion   Active Restricted? Passive Restricted? Pain   Flexion         Protraction         Retraction           Cervical Range of Motion   Active (deg) Passive (deg) Pain   Flexion 27       Extension 40       Right Lateral Flexion 10       Right Rotation 35   Yes   Left Lateral Flexion 10       Left Rotation 40                      Treatment:  Therapeutic Exercise  Therapeutic Exercise Activity 2: UT/LS stretch (R only) 3x30s    Manual Therapy  Manual Therapy Activity 1: Gentle Cervical Traction 4'  Manual Therapy Activity 2: Sub-Occipital Release 4'    Balance/Neuromuscular Re-Education  Balance/Neuromuscular Re-Education Activity 1: Reassessment 10'  Balance/Neuromuscular Re-Education Activity 2: Supine cervical retraction over towel 20x3s  Balance/Neuromuscular Re-Education Activity 3: Supine No Money's RTB over towel 3x10  Balance/Neuromuscular Re-Education Activity 4: Supine T's RTB over towel 3x10  Balance/Neuromuscular Re-Education Activity 5: Rows GTB 3x10    Therapeutic Activity  Therapeutic Activity 1: Pt education  (Goals, POC) 5'    Assessment & Plan   Assessment: Had discussion with pt about expectations of PT and POC moving forward, pt verbalized understanding of both. Reassessment was performed, pt had improved mobility with cervical flexion, this could be a result of the injection he recieved last week. Pt reported he has intermittent numbness/tingling into his hand, he was instructed to make a note of the activity he is performing when this occurs. Exercises were performed over towel while supine, positioning the thoracic spine in extension and promoting proper posture. POC will continue to focus on pt education on posture and activity modifications, periscapular strengthening, and improving cervical mobility via stretching of the UT and levator.  Evaluation/Treatment Tolerance: Patient tolerated treatment well    Patient will continue to benefit from skilled outpatient physical therapy to address the deficits listed in the problem list box on initial evaluation, provide pt/family education and to maximize pt's level of independence in the home and community environment.     Patient's spiritual, cultural, and educational needs considered and patient agreeable to plan of care and goals.           Plan: Pt will continue to benefit from stretching UT and levator, strengthening periscapular muscles, and pt education on proper posture and activity modification.    Goals:   Active       Long Term Goals (8 weeks)       Pain Goal       Start:  02/05/25    Expected End:  04/02/25       Pt will not experience greater than 2/10 pain at his worst, allowing him to return to his ADLs such as pickleball.          Nerve Goal       Start:  02/05/25    Expected End:  04/02/25       Pt will have no provocation of radicular symptoms with ULTT showing significant improvement in nerve mobility and allowing him to improve function in his hand.          ROM Goal       Start:  02/05/25    Expected End:  04/02/25       Pt will be able to achieve  45 degrees of cervical flexion, allowing him to restore normal ROM and to look down towards the ball while playing pickleball.             Short Term Goals (4 weeks)       Pain Goal       Start:  02/05/25    Expected End:  03/05/25       Pt to report a decrease in pain at its worse from 9/10 to <5/10 by 4 weeks which will allow pt to improve participation in ADLs.         Nerve Goal       Start:  02/05/25    Expected End:  03/05/25       Pt will have no radicular symptoms with ULTT testing until shoulder ER, which will show nerve mobility improvement from 45 degrees shoulder abduction only.          ROM Goal       Start:  02/05/25    Expected End:  03/05/25       Pt will achieve 30 degrees of cervical flexion which will allow the pt to look towards his feet if required while ambulating improving his safety.              Bryan Bernabe, PT, DPT

## 2025-03-11 ENCOUNTER — CLINICAL SUPPORT (OUTPATIENT)
Dept: REHABILITATION | Facility: HOSPITAL | Age: 80
End: 2025-03-11
Payer: MEDICARE

## 2025-03-11 DIAGNOSIS — M25.511 ACUTE PAIN OF RIGHT SHOULDER: ICD-10-CM

## 2025-03-11 DIAGNOSIS — R29.898 DECREASED ROM OF NECK: Primary | ICD-10-CM

## 2025-03-11 PROCEDURE — 97110 THERAPEUTIC EXERCISES: CPT | Mod: HCNC,PO

## 2025-03-11 PROCEDURE — 97112 NEUROMUSCULAR REEDUCATION: CPT | Mod: HCNC,PO

## 2025-03-11 PROCEDURE — 97140 MANUAL THERAPY 1/> REGIONS: CPT | Mod: HCNC,PO

## 2025-03-11 NOTE — PROGRESS NOTES
Outpatient Rehab    Physical Therapy Visit    Patient Name: Sergei Bernal III  MRN: [id number]  YOB: 1945  Encounter Date: 3/11/2025    Therapy Diagnosis:   Encounter Diagnoses   Name Primary?    Decreased ROM of neck Yes    Acute pain of right shoulder      Physician: Héctor Cade, PA-C       Physician Orders: Eval and Treat  Medical Diagnosis: Cervical radiculopathy      Visit # / Visits Authorized:  1 / 1 5/20  Date of Evaluation:  2/5/2025   Insurance Authorization Period: 1/16/2025 to 1/16/2026  Plan of Care Certification:  2/5/2025 to 4/2/2025      Time In: 1100   Time Out: 1155  Total Time: 55   Total Billable Time: 55              Subjective   No changes from last week, still unable to tract cause of radicular symptoms.         Objective            Treatment:  Therapeutic Exercise  Therapeutic Exercise Activity 2: UT/LS stretch (R only) 3x30s    Manual Therapy  Manual Therapy Activity 1: Gentle Cervical Traction 4'  Manual Therapy Activity 2: Sub-Occipital Release 4'  Manual Therapy Activity 3: Gentle R facet gapping C6-7 4'  Manual Therapy Activity 4: STM R C6-7 paraspinals 4'    Balance/Neuromuscular Re-Education  Balance/Neuromuscular Re-Education Activity 1: modified nerve tensioner 20x  Balance/Neuromuscular Re-Education Activity 2: modified ulnar nerve glide x20  Balance/Neuromuscular Re-Education Activity 3: hooklying PPT 20x5s  Balance/Neuromuscular Re-Education Activity 4: hooklying PPT + scap retraction 20x5s  Balance/Neuromuscular Re-Education Activity 5: supine chin tuck 20x5s  Balance/Neuromuscular Re-Education Activity 6: Supine No Money's RTB over towel 3x10  Balance/Neuromuscular Re-Education Activity 7: Supine T's RTB over towel 3x10         Assessment & Plan   Assessment: Small improvement with neural mobility noted following manual treating C6-7 segment. Continue with postural ed with the addition of lumbopelvic retraining secondary to reports of low back tightness with  scapular retractions. Will continue to treat segments C6-7 for additional visits.  Evaluation/Treatment Tolerance: Patient tolerated treatment well    Patient will continue to benefit from skilled outpatient physical therapy to address the deficits listed in the problem list box on initial evaluation, provide pt/family education and to maximize pt's level of independence in the home and community environment.     Patient's spiritual, cultural, and educational needs considered and patient agreeable to plan of care and goals.           Plan: Pt will continue to benefit from stretching UT and levator, strengthening periscapular muscles, and pt education on proper posture and activity modification.    Goals:   Active       Long Term Goals (8 weeks)       Pain Goal       Start:  02/05/25    Expected End:  04/02/25       Pt will not experience greater than 2/10 pain at his worst, allowing him to return to his ADLs such as pickleball.          Nerve Goal       Start:  02/05/25    Expected End:  04/02/25       Pt will have no provocation of radicular symptoms with ULTT showing significant improvement in nerve mobility and allowing him to improve function in his hand.          ROM Goal       Start:  02/05/25    Expected End:  04/02/25       Pt will be able to achieve 45 degrees of cervical flexion, allowing him to restore normal ROM and to look down towards the ball while playing pickleball.             Short Term Goals (4 weeks)       Pain Goal       Start:  02/05/25    Expected End:  03/05/25       Pt to report a decrease in pain at its worse from 9/10 to <5/10 by 4 weeks which will allow pt to improve participation in ADLs.         Nerve Goal       Start:  02/05/25    Expected End:  03/05/25       Pt will have no radicular symptoms with ULTT testing until shoulder ER, which will show nerve mobility improvement from 45 degrees shoulder abduction only.          ROM Goal       Start:  02/05/25    Expected End:  03/05/25        Pt will achieve 30 degrees of cervical flexion which will allow the pt to look towards his feet if required while ambulating improving his safety.              Bryan Bernabe, PT, DPT

## 2025-03-13 ENCOUNTER — CLINICAL SUPPORT (OUTPATIENT)
Dept: REHABILITATION | Facility: HOSPITAL | Age: 80
End: 2025-03-13
Payer: MEDICARE

## 2025-03-13 DIAGNOSIS — R29.898 DECREASED ROM OF NECK: Primary | ICD-10-CM

## 2025-03-13 PROCEDURE — 97112 NEUROMUSCULAR REEDUCATION: CPT | Mod: HCNC,PO

## 2025-03-14 NOTE — PROGRESS NOTES
"  Outpatient Rehab    Physical Therapy Visit    Patient Name: Sergei Bernal III  MRN: [id number]  YOB: 1945  Encounter Date: 3/13/2025    Therapy Diagnosis:   Encounter Diagnosis   Name Primary?    Decreased ROM of neck Yes     Physician: Héctor Cade PA-C         Physician Orders: Eval and Treat  Medical Diagnosis: Cervical radiculopathy      Visit # / Visits Authorized:  1 / 1   Date of Evaluation:  2/5/2025   Insurance Authorization Period: 1/16/2025 to 1/16/2026  Plan of Care Certification:  2/5/2025 to 4/2/2025     Time In: 1300   Time Out: 1400  Total Time: 60   Total Billable Time: 25 (3619-4054)    FOTO:  Intake Score:  %  Survey Score 1:  %  Survey Score 2:  %         Subjective   "My neck is not a problem anymore it is mainly my lower back now.".  Pain reported as 2/10. low back    Objective            Treatment:  Therapeutic Exercise  Therapeutic Exercise Activity 1: LTR 20x5s  Therapeutic Exercise Activity 2: UT/LS stretch (R only) 3x30s  Therapeutic Exercise Activity 3: SKTC 20x5s    Manual Therapy  Manual Therapy Activity 1: Gentle Cervical Traction 4'  Manual Therapy Activity 2: Sub-Occipital Release 4'    Balance/Neuromuscular Re-Education  Balance/Neuromuscular Re-Education Activity 3: hooklying PPT 20x5s  Balance/Neuromuscular Re-Education Activity 4: hooklying PPT + scap retraction 20x5s  Balance/Neuromuscular Re-Education Activity 5: supine chin tuck 20x5s  Balance/Neuromuscular Re-Education Activity 6: Supine No Money's RTB over towel 3x10  Balance/Neuromuscular Re-Education Activity 7: bridge 3x10  Balance/Neuromuscular Re-Education Activity 8: clams 3x10 ea         Assessment & Plan   Assessment: Lower crossed intervention prescribed today secondary to increased reports of low back pain. Home program updated with all of today's intervention. Several modifications to positioning made secondary to increased RUE radicular pain. Continue to focus on upper crossed " impairments.  Evaluation/Treatment Tolerance: Patient tolerated treatment well    Patient will continue to benefit from skilled outpatient physical therapy to address the deficits listed in the problem list box on initial evaluation, provide pt/family education and to maximize pt's level of independence in the home and community environment.     Patient's spiritual, cultural, and educational needs considered and patient agreeable to plan of care and goals.           Plan: Pt will continue to benefit from stretching UT and levator, strengthening periscapular muscles, and pt education on proper posture and activity modification.    Goals:   Active       Long Term Goals (8 weeks)       Pain Goal (Progressing)       Start:  02/05/25    Expected End:  04/02/25       Pt will not experience greater than 2/10 pain at his worst, allowing him to return to his ADLs such as pickleball.          Nerve Goal (Progressing)       Start:  02/05/25    Expected End:  04/02/25       Pt will have no provocation of radicular symptoms with ULTT showing significant improvement in nerve mobility and allowing him to improve function in his hand.          ROM Goal (Progressing)       Start:  02/05/25    Expected End:  04/02/25       Pt will be able to achieve 45 degrees of cervical flexion, allowing him to restore normal ROM and to look down towards the ball while playing pickleball.             Short Term Goals (4 weeks)       Pain Goal (Progressing)       Start:  02/05/25    Expected End:  03/05/25       Pt to report a decrease in pain at its worse from 9/10 to <5/10 by 4 weeks which will allow pt to improve participation in ADLs.         Nerve Goal (Progressing)       Start:  02/05/25    Expected End:  03/05/25       Pt will have no radicular symptoms with ULTT testing until shoulder ER, which will show nerve mobility improvement from 45 degrees shoulder abduction only.          ROM Goal (Progressing)       Start:  02/05/25    Expected End:   03/05/25       Pt will achieve 30 degrees of cervical flexion which will allow the pt to look towards his feet if required while ambulating improving his safety.              Bryan Bernabe, PT, DPT

## 2025-03-19 ENCOUNTER — CLINICAL SUPPORT (OUTPATIENT)
Dept: REHABILITATION | Facility: HOSPITAL | Age: 80
End: 2025-03-19
Payer: MEDICARE

## 2025-03-19 ENCOUNTER — OFFICE VISIT (OUTPATIENT)
Dept: PAIN MEDICINE | Facility: CLINIC | Age: 80
End: 2025-03-19
Payer: MEDICARE

## 2025-03-19 VITALS
DIASTOLIC BLOOD PRESSURE: 65 MMHG | WEIGHT: 166.13 LBS | HEIGHT: 72 IN | SYSTOLIC BLOOD PRESSURE: 102 MMHG | BODY MASS INDEX: 22.5 KG/M2 | HEART RATE: 70 BPM

## 2025-03-19 DIAGNOSIS — M54.12 CERVICAL RADICULOPATHY: Primary | ICD-10-CM

## 2025-03-19 DIAGNOSIS — M25.511 ACUTE PAIN OF RIGHT SHOULDER: ICD-10-CM

## 2025-03-19 DIAGNOSIS — R29.898 DECREASED ROM OF NECK: Primary | ICD-10-CM

## 2025-03-19 DIAGNOSIS — M54.2 CERVICALGIA: ICD-10-CM

## 2025-03-19 PROCEDURE — 97530 THERAPEUTIC ACTIVITIES: CPT | Mod: HCNC,PO

## 2025-03-19 PROCEDURE — 99999 PR PBB SHADOW E&M-EST. PATIENT-LVL IV: CPT | Mod: PBBFAC,HCNC,, | Performed by: ANESTHESIOLOGY

## 2025-03-19 NOTE — PROGRESS NOTES
[facility]      PCP:   Ray Anna MD    CC:   Chief Complaint   Patient presents with    Neck Pain          3/19/2025     8:34 AM 1/31/2025     1:02 PM 1/16/2025     3:22 PM   Last 3 PDI Scores   Pain Disability Index (PDI) 6 10 35       Interval HPI 3/19/2025: Srinivasan Bernal III returns to the office for follow up.  He is s/p cervical C7-T1 MARITZA on 2/26/25.  He reports about 60-70% relief of the pain that was going down his right shoulder to the right proximal arm.  He reports about only 20% relief with the tingling then experiences down his right forearm to the 4th and 5th digit of his right hand.  His main complaint today is he still experiences intermittent tingling into the right arm and right 4th and 5th digit.      Pain Intervention History:  - s/p cervical C7-T1 MARITZA on 2/26/25    Past Spine Surgical History:  none      HPI:   Mr. Mai returns for follow up of back pain after PT.  Last seen right lower back and buttock pain that increased with walking and improved with sitting.  He compelded medrol taper and has been working with PT.  While PT has helped improve, but not resolve right lower back pain, he has developed weakness in the right quadriceps.  PT contacted me 5/29/23 about acute onset weakness in the right quadriceps.  Mr. Mai has noticed diffculty stepping up onto a curb because of the weakness.    He has some history of right knee welling since a replacement; he had an xray of the knee and is scheduled to see orthopedics soon for further assessment.     Initial HPI:  Srinivasan Bernal III is a 79 y.o. male with history of CAD presents with lower back pain.  He has had similar pain in 2007 and 2011 with benefit from PT in the past.  Current pain started 3/22/23 without triggering event.  He has pain in the right lower lumbar region/ buttock.  No radicular leg pain, numbness or tignling.  Pain is worse with walking and improves with sitting.  No pain with laying down.   Has tried ibuprofen.    He had MRI and xray in the past showing right L5 pars deffect and anterolisthesis of L5 on S1.      Past and current medications:  Antineuropathics:  NSAIDs:  ibuprofen  Antidepressants:  Muscle relaxers:  Opioids:  Antiplatelets/Anticoagulants:  ASA  Others: completed medrol taper    Physical therapy/ Chiropractic care:  PT in the past and currently going with benefit      History:    Current Outpatient Medications:     aspirin (ECOTRIN) 81 MG EC tablet, Take 81 mg by mouth once daily., Disp: , Rfl:     atorvastatin (LIPITOR) 80 MG tablet, Take 1 tablet (80 mg total) by mouth every evening., Disp: 90 tablet, Rfl: 2    ezetimibe (ZETIA) 10 mg tablet, Take 1 tablet (10 mg total) by mouth once daily., Disp: 90 tablet, Rfl: 2    famotidine (PEPCID) 40 MG tablet, Take 1 tablet by mouth once daily., Disp: , Rfl:     ibuprofen (ADVIL,MOTRIN) 200 MG tablet, Take 200 mg by mouth every 6 (six) hours as needed for Pain., Disp: , Rfl:     ipratropium (ATROVENT) 42 mcg (0.06 %) nasal spray, SMARTSI-2 Spray(s) Both Nares 1-3 Times Daily, Disp: , Rfl:     naproxen (NAPROSYN) 500 MG tablet, Take 1 tablet (500 mg total) by mouth 2 (two) times daily as needed., Disp: 45 tablet, Rfl: 1    perindopril erbumine (ACEON) 4 mg tablet, TAKE 1 TABLET EVERY DAY, Disp: 90 tablet, Rfl: 2    methylPREDNISolone (MEDROL DOSEPACK) 4 mg tablet, use as directed (Patient not taking: Reported on 1/10/2025), Disp: 21 tablet, Rfl: 0    tadalafiL (CIALIS) 20 MG Tab, Take 1 tablet (20 mg total) by mouth once daily., Disp: 30 tablet, Rfl: 11    Past Medical History:   Diagnosis Date    Cataracts, bilateral     ,     Coronary artery disease 2004    Hypercholesteremia     Kidney stone     Urerteroscopy    Skin cancer 2015    left ankle/ left ear       Past Surgical History:   Procedure Laterality Date    ANKLE FUSION      CIRCUMCISION      COLONOSCOPY N/A 2022    Procedure: COLONOSCOPY;  Surgeon: Natasha GARCIA  MD Kemal;  Location: [facility] ENDO;  Service: Endoscopy;  Laterality: N/A;    EPIDURAL STEROID INJECTION INTO CERVICAL SPINE N/A 2/26/2025    Procedure: Injection-steroid-epidural-cervical    C7/T1;  Surgeon: Jordin Huggins MD;  Location: [facility] OR;  Service: Pain Management;  Laterality: N/A;  normal    FOOT SURGERY      HERNIA REPAIR Left 2008    KNEE ARTHROSCOPY Left 2010    KNEE ARTHROSCOPY Right 2010    KNEE SURGERY Left 2010    partial replacement    KNEE SURGERY Right 2012    replacement    MOUTH SURGERY      STENT, DRUG ELUTING, SINGLE VESSEL, CORONARY      TOE SURGERY      TONSILLECTOMY         Family History   Problem Relation Name Age of Onset    Heart disease Father      Dementia Mother         Social History     Socioeconomic History    Marital status:    Tobacco Use    Smoking status: Some Days     Types: Cigars    Smokeless tobacco: Never   Substance and Sexual Activity    Alcohol use: Yes     Comment: rarely    Drug use: No    Sexual activity: Yes     Social Drivers of Health     Financial Resource Strain: Low Risk  (2/16/2024)    Overall Financial Resource Strain (CARDIA)     Difficulty of Paying Living Expenses: Not hard at all   Food Insecurity: No Food Insecurity (2/16/2024)    Hunger Vital Sign     Worried About Running Out of Food in the Last Year: Never true     Ran Out of Food in the Last Year: Never true   Transportation Needs: No Transportation Needs (2/16/2024)    PRAPARE - Transportation     Lack of Transportation (Medical): No     Lack of Transportation (Non-Medical): No   Physical Activity: Sufficiently Active (2/16/2024)    Exercise Vital Sign     Days of Exercise per Week: 7 days     Minutes of Exercise per Session: 60 min   Stress: No Stress Concern Present (2/16/2024)    [employer] [employer] - Occupational Stress Questionnaire     Feeling of Stress : Not at all   Housing Stability: Low Risk  (2/16/2024)    Housing Stability Vital Sign     Unable to Pay for  Housing in the Last Year: No     Number of Places Lived in the Last Year: 2     Unstable Housing in the Last Year: No       Review of patient's allergies indicates:  No Known Allergies    Review of Systems:  Right low back/ buttock pain.  Balance of review of systems is negative.    Physical Exam:  Vitals:    03/19/25 0835   BP: 102/65   Pulse: 70   Weight: 75.4 kg (166 lb 1.9 oz)   Height: 6' (1.829 m)   PainSc:   2   PainLoc: Neck           Body mass index is 22.53 kg/m².      Gen: A and O x3, pleasant, well-groomed  Skin: No rashes or obvious lesions  HEENT: PERRLA, no obvious deformities on ears or in canals.Trachea midline.  CVS: Regular rate and rhythm, normal palpable pulses.  Resp: Clear to auscultation bilaterally, no wheezes or rales.  Abdomen: Soft, NT/ND.  Musculoskeletal: . No antalgic gait.     Neuro:  Motor:    Right Left   C4 Shoulder Abduction  5  5   C5 Elbow Flexion    5  5   C6 Wrist Extension  5  5   C7 Elbow Extension   5  5   C8/T1 Hand Intrinsics   5  5   C8 First Dorsal Interosseus  5  5   C8 Abductor Pollicus Brevis  5  5      Left  Right    Triceps DTR 2+ 2+   Biceps DTR 2+ 2+   Brachioradialis DTR 2+ 2+   Patellar DTR     Achilles DTR     Ramirez     Clonus     Babinski       Sensory: Intact and symmetrical to light touch and pinprick in C2-T1 dermatomes bilaterally.  Cervical spine: ROM is full in flexion, extension and lateral rotation without increased pain.  Spurling's maneuver causes no neck pain to either side.  Myofascial exam: No Tenderness to palpation across cervical paraspinous region bilaterally.      Imaging:  MRI lumbar spine 6/8/23  FINDINGS:  Exaggerated generalized lumbar lordotic curvature L2-L5.  Kyphotic curvature T11-L2.  No acute fracture or destructive lesion evident.  Distal spinal cord and conus are grossly normal.     T11-T12 and T12-L1 demonstrate moderate disc space narrowing and mild disc bulge.  There is facet arthrosis at T11-T12 and mild foraminal  narrowing.  L1-L2 severe disc space narrowing with near complete obliteration of the disc space, marginal retrolisthesis and osteophytic change along the endplate margin, mild moderate foraminal narrowing.  L2-L3 demonstrates moderate severe disc space narrowing, marginal retrolisthesis, moderate osteophytic change and desiccated disc bulge, moderate facet arthrosis, prominent ligament flavum thickening, moderate severe spinal canal narrowing with reduction of the AP diameter to 3-4 mm and near complete effacement of the CSF, moderate severe foraminal narrowing with nerve root distortion.  L3-L4 moderate disc space narrowing, desiccation, moderate severe disc bulge, moderate bilateral facet arthrosis with prominent ligament flavum thickening, mild moderate spinal canal narrowing reduction of the AP diameter to 5-6 mm, severe right-sided foraminal narrowing with nerve root impingement, asymmetric right-sided severe disc bulging.  L4-5 demonstrates severe disc space narrowing, 3-4 mm anterolisthesis, severe disc bulge with uncovering of the disc, severe facet arthrosis with osteophytic hypertrophy, bilateral lateral recess narrowing with suspected L5 nerve root impingement bilaterally, severe right-sided foraminal narrowing with nerve root impingement, moderate severe left foraminal narrowing with nerve root impingement.  L5-S1 demonstrates solid osseous fusion, anterolisthesis, moderate foraminal narrowing with nerve root distortion bilaterally.     Intra-abdominal organs and paraspinal soft tissues are normal.       MRI cervical spine 1/25/25  FINDINGS:  The visualized posterior fossa structures including the shanna, mid brain, and cerebellum are unremarkable.  No Chiari malformation.  The incidentally observed paranasal sinuses are clear.     The cervical vertebral bodies show normal height and signal intensity without evidence of acute compression fracture or pathologic marrow replacement process.  No  spondylolisthesis.     There is reversal of the normal cervical lordosis which could relate to neck muscle spasm.  There is degenerative disc desiccation at every cervical and visualized upper thoracic level.  There is anterior marginal osteophyte formation and degenerative disc space narrowing at C5-C6, C6-C7, and C7-T1.     The cervical spinal cord is normal in signal intensity without evidence of cord edema, myelomalacia, or cord syrinx.  No epidural fluid collections or masses.     The incidentally observed soft tissues of the neck show no significant abnormalities.     C2-C3: There is right facet arthropathy.  No disc protrusion or extrusion. No central canal stenosis or neuroforaminal stenosis.  C3-C4: There is bilateral hypertrophic degenerative facet arthropathy and uncovertebral spurring noted.  There is a posterior disc osteophyte complex and effacement of the anterior CSF sleeve.  There is severe bilateral neuroforaminal stenosis.  There is mild overall central canal stenosis.  C4-C5: There is bilateral uncovertebral spurring.  There is prominent left facet arthropathy, left greater than right.  There is severe left neuroforaminal stenosis.  There is, at most, mild right neuroforaminal stenosis.  There is effacement of the anterior CSF sleeve.  No central canal stenosis  C5-C6: There is a  posterior disc osteophyte complex and a superimposed broad right paracentral disc protrusion which exerts mass effect on the right paracentral ventral cervical spinal cord.  No abnormal cord signal.  There is bilateral facet arthropathy.  There is severe right neuroforaminal stenosis.  There is moderate-severe left neuroforaminal stenosis.  No overall central canal stenosis.  C6-C7: There is a broad left paracentral disc protrusion which indents the ventral cord.  No abnormal cord signal appreciated.  There is a possible 3 mm right foraminal disc protrusion visible on series 7, image 22.  This could produce symptoms  "referable to the right C7 nerve.  No left or right neuroforaminal stenosis.  No central canal stenosis.  There is mild prominence of the central canal of the spinal cord which measures up to 2 mm in AP dimension on series 7, image 22.  C7-T1: There is a bulging posterior disc osteophyte complex which effaces the anterior CSF sleeve and extends into both neural foramina.  There is severe bilateral neuroforaminal stenosis which could produce symptoms referable to the C8 nerves.  There is mild bilateral facet arthropathy.      Labs:  No results found for: "LABA1C", "HGBA1C"    Lab Results   Component Value Date    WBC 5.01 08/12/2024    HGB 13.6 (L) 08/12/2024    HCT 40.9 08/12/2024    MCV 93 08/12/2024     08/12/2024           Problem List Items Addressed This Visit    None  Visit Diagnoses         Cervical radiculopathy    -  Primary    Relevant Orders    EMG W/ ULTRASOUND AND NERVE CONDUCTION TEST 2 Extremities      Cervicalgia                Assessment:       3/19/25 - Srinivasan Bernal III returns to the office for follow up.  He is s/p cervical C7-T1 MARITZA on 2/26/25.  He reports about 60-70% relief of the pain that was going down his right shoulder to the right proximal arm.  He reports about only 20% relief with the tingling then experiences down his right forearm to the 4th and 5th digit of his right hand.  His main complaint today is he still experiences intermittent tingling into the right arm and right 4th and 5th digit.    - I independently reviewed his cervical MRI with him today.  At C5-6 he has a right paracentral disc protrusion causing severe right foraminal narrowing and right lateral recess narrowing.  At C7-T1 he has significant bilateral foraminal narrowing  - over the past 6 weeks he has participate in formal physical therapy and maintains PT directed home exercise program  - he has had a very nice response with cervical MARITZA to the pain that was going down the proximal right arm.  Previously " he was unable to do things like comb his hair without pain however at this time he feels significant improvement in that pain.  Does have persistent tingling into the 4th and 5th digit on the right hand.  - at this time I would like to get an EMG/NCS to get a little more information.  We will call him once that is complete to discuss further treatment recommendations      : Not applicable    This note was completed with dictation software and grammatical errors may exist.

## 2025-03-19 NOTE — PROGRESS NOTES
"  Outpatient Rehab    Physical Therapy Visit    Patient Name: Sergei Bernla III  MRN: [id number]  YOB: 1945  Encounter Date: 3/19/2025    Therapy Diagnosis:   Encounter Diagnoses   Name Primary?    Decreased ROM of neck Yes    Acute pain of right shoulder      Physician: Héctor Cade, ANGELINA    Physician Orders: Eval and Treat  Medical Diagnosis: Cervical radiculopathy    Visit # / Visits Authorized:  7 / 20  Date of Evaluation: 2/5/2025  Insurance Authorization Period: 1/30/2025 to 6/30/2025  Plan of Care Certification:  2/5/2025 to 4/2/2025         Time In: 1000   Time Out: 1030  Total Time: 30   Total Billable Time: 30    FOTO:  Intake Score: 14%  Survey Score 1: 16%  Survey Score 2: 8%         Subjective   "I think today can be my last day of therapy.".  Pain reported as 2/10. low back    Objective            Treatment:  Therapeutic Activity  TA 1: PT education and home program: (LTR, SKTC, supine hip flexor stretch, PPT, bridge, clams, UT/LS stretch, chin tucks, scap retractions, and rowing)    Time Entry(in minutes):  Therapeutic Activity Time Entry: 30    Assessment & Plan   Assessment: Reviewed home program at today's session. Did have to remove some interventions that caused an increase in RUE paresthesia. Educated patient to return to clinic if symptoms worsen.  Evaluation/Treatment Tolerance: Patient tolerated treatment well    Patient will continue to benefit from skilled outpatient physical therapy to address the deficits listed in the problem list box on initial evaluation, provide pt/family education and to maximize pt's level of independence in the home and community environment.     Patient's spiritual, cultural, and educational needs considered and patient agreeable to plan of care and goals.           Plan: Discharge to independent home program    Goals:   Active       Long Term Goals (8 weeks)       Pain Goal (Progressing)       Start:  02/05/25    Expected End:  04/02/25       Pt will " not experience greater than 2/10 pain at his worst, allowing him to return to his ADLs such as pickleball.          Nerve Goal (Progressing)       Start:  02/05/25    Expected End:  04/02/25       Pt will have no provocation of radicular symptoms with ULTT showing significant improvement in nerve mobility and allowing him to improve function in his hand.          ROM Goal (Progressing)       Start:  02/05/25    Expected End:  04/02/25       Pt will be able to achieve 45 degrees of cervical flexion, allowing him to restore normal ROM and to look down towards the ball while playing pickleball.             Short Term Goals (4 weeks)       Pain Goal (Progressing)       Start:  02/05/25    Expected End:  03/05/25       Pt to report a decrease in pain at its worse from 9/10 to <5/10 by 4 weeks which will allow pt to improve participation in ADLs.         Nerve Goal (Progressing)       Start:  02/05/25    Expected End:  03/05/25       Pt will have no radicular symptoms with ULTT testing until shoulder ER, which will show nerve mobility improvement from 45 degrees shoulder abduction only.          ROM Goal (Progressing)       Start:  02/05/25    Expected End:  03/05/25       Pt will achieve 30 degrees of cervical flexion which will allow the pt to look towards his feet if required while ambulating improving his safety.              Bryan Bernabe, PT, DPT

## 2025-03-27 ENCOUNTER — TELEPHONE (OUTPATIENT)
Dept: PAIN MEDICINE | Facility: CLINIC | Age: 80
End: 2025-03-27
Payer: MEDICARE

## 2025-03-27 ENCOUNTER — OFFICE VISIT (OUTPATIENT)
Dept: PHYSICAL MEDICINE AND REHAB | Facility: CLINIC | Age: 80
End: 2025-03-27
Payer: MEDICARE

## 2025-03-27 DIAGNOSIS — M54.12 CERVICAL RADICULOPATHY: ICD-10-CM

## 2025-03-27 DIAGNOSIS — G56.21 CUBITAL TUNNEL SYNDROME ON RIGHT: Primary | ICD-10-CM

## 2025-03-27 DIAGNOSIS — G56.01 CARPAL TUNNEL SYNDROME OF RIGHT WRIST: Primary | ICD-10-CM

## 2025-03-27 DIAGNOSIS — G56.21 CUBITAL TUNNEL SYNDROME ON RIGHT: ICD-10-CM

## 2025-03-27 NOTE — TELEPHONE ENCOUNTER
Please let the patient know that his nerve study showed he has some compression around the nerves in his arm that is likely causing the numbness into his hand    I would like him to see one of our orthopedic hand specialist for an evaluation.  I have placed a referral.

## 2025-03-27 NOTE — TELEPHONE ENCOUNTER
Spoke with patient and relayed Dr. Huggins's result note and recommendation. He has an appointment already scheduled with Dr. Villavicencio

## 2025-03-27 NOTE — PROGRESS NOTES
[facility]  [address] [facility] [address]  [place] [address]       Full Name: Srinivasan Bernal Gender: Male  MRN: [id number] YOB: 1945  History: Patient complaining of right upper extremity tingling, including the 5th digits. No problems on the left.  No neck pain.        Visit Date: 3/27/2025 8:39 AM  Age: 79 Years  Examining Physician: Mgiuel Peck MD   Referring Physician:  Jordin Huggins MD   Height: 6 feet 0 inch      Sensory NCS      Nerve / Sites Rec. Site Onset Lat Peak Lat NP Amp PP Amp Segments Distance Velocity     ms ms µV µV  cm m/s   R Median - Digit III (Antidromic)      Wrist Dig III 2.85 3.81 12.4 16.0 Wrist - Dig III 14 49   R Ulnar - Digit V (Antidromic)      Wrist Dig V 2.63 3.58 12.2 18.0 Wrist - Dig V 14 53   R Radial - Anatomical snuff box (Forearm)      Forearm Wrist 2.13 2.81 15.6  Forearm - Wrist 10 47       Motor NCS      Nerve / Sites Muscle Latency Amplitude Amp % Duration Segments Distance Lat Diff Velocity     ms mV % ms  cm ms m/s   R Median - APB      Wrist APB NR NR NR NR Wrist - APB 8     R Ulnar - ADM      Wrist ADM 3.27 8.7 100 7.88 Wrist - ADM 8        B.Elbow ADM 6.90 6.0 68.9 8.02 B.Elbow - Wrist 22 3.63 61      A.Elbow ADM 9.21 5.3 61.3 7.13 A.Elbow - B.Elbow 10 2.31 43       EMG Summary Table     Spontaneous MUAP Recruitment   Muscle IA Fib PSW Fasc CRD Amp Dur. Poly Pattern   R. Deltoid N None None None None N N None N   R. Biceps brachii N None None None None N N None N   R. Triceps brachii N None None None None N N None N   R. Pronator teres N None None None None N N None N   R. First dorsal interosseous N None None None None N N None N   R. Abductor pollicis brevis N None None None None N N None N       Summary    The motor conduction test had results outside of the specified normal range in all 2 of the tested nerves:  In the R Median - APB study  the response was considered absent for Wrist stimulation  In the R Ulnar - ADM study  the take off  velocity result was reduced for A.Elbow - B.Elbow segment    The sensory conduction test was performed on 3 nerve(s). The results were normal in 2 nerve(s): R Ulnar - Digit V (Antidromic), R Radial - Anatomical snuff box (Forearm). Results outside the specified normal range were found in 1 nerve(s), as follows:  In the R Median - Digit III (Antidromic) study  the peak latency result was increased for Wrist stimulation  the peak amplitude result was reduced for Wrist stimulation    The needle EMG study was normal in all 6 tested muscles: R. Deltoid, R. Biceps brachii, R. Triceps brachii, R. Pronator teres, R. First dorsal interosseous, R. Abductor pollicis brevis.    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      PLAN: Recommended bracing of the involved wrist and elbow and f/u with referring provider for additional management.  ------------------------------------  Miguel Peck MD

## 2025-04-08 DIAGNOSIS — I25.10 CORONARY ARTERY DISEASE: ICD-10-CM

## 2025-04-08 DIAGNOSIS — E78.5 DYSLIPIDEMIA: ICD-10-CM

## 2025-04-08 RX ORDER — EZETIMIBE 10 MG/1
10 TABLET ORAL
Qty: 90 TABLET | Refills: 3 | Status: SHIPPED | OUTPATIENT
Start: 2025-04-08

## 2025-04-08 NOTE — TELEPHONE ENCOUNTER
No care due was identified.  [facility] Embedded Care Due Messages. Reference number: [id number].   4/08/2025 12:30:07 PM CDT

## 2025-04-08 NOTE — TELEPHONE ENCOUNTER
Refill Decision Note   Srinivasan Gabe  is requesting a refill authorization.  Brief Assessment and Rationale for Refill:  Approve     Medication Therapy Plan:         Comments:     Note composed:2:34 PM 04/08/2025

## 2025-04-21 ENCOUNTER — OFFICE VISIT (OUTPATIENT)
Dept: ORTHOPEDICS | Facility: CLINIC | Age: 80
End: 2025-04-21
Payer: MEDICARE

## 2025-04-21 ENCOUNTER — TELEPHONE (OUTPATIENT)
Dept: ORTHOPEDICS | Facility: CLINIC | Age: 80
End: 2025-04-21

## 2025-04-21 DIAGNOSIS — G56.01 CARPAL TUNNEL SYNDROME ON RIGHT: Primary | ICD-10-CM

## 2025-04-21 DIAGNOSIS — G56.21 CUBITAL TUNNEL SYNDROME ON RIGHT: ICD-10-CM

## 2025-04-21 PROCEDURE — 1125F AMNT PAIN NOTED PAIN PRSNT: CPT | Mod: HCNC,CPTII,S$GLB, | Performed by: ORTHOPAEDIC SURGERY

## 2025-04-21 PROCEDURE — 3288F FALL RISK ASSESSMENT DOCD: CPT | Mod: HCNC,CPTII,S$GLB, | Performed by: ORTHOPAEDIC SURGERY

## 2025-04-21 PROCEDURE — 1101F PT FALLS ASSESS-DOCD LE1/YR: CPT | Mod: HCNC,CPTII,S$GLB, | Performed by: ORTHOPAEDIC SURGERY

## 2025-04-21 PROCEDURE — 99999 PR PBB SHADOW E&M-EST. PATIENT-LVL III: CPT | Mod: PBBFAC,HCNC,, | Performed by: ORTHOPAEDIC SURGERY

## 2025-04-21 PROCEDURE — 99203 OFFICE O/P NEW LOW 30 MIN: CPT | Mod: 25,HCNC,S$GLB, | Performed by: ORTHOPAEDIC SURGERY

## 2025-04-21 PROCEDURE — 20526 THER INJECTION CARP TUNNEL: CPT | Mod: HCNC,RT,S$GLB, | Performed by: ORTHOPAEDIC SURGERY

## 2025-04-21 PROCEDURE — 1159F MED LIST DOCD IN RCRD: CPT | Mod: HCNC,CPTII,S$GLB, | Performed by: ORTHOPAEDIC SURGERY

## 2025-04-21 RX ADMIN — TRIAMCINOLONE ACETONIDE 40 MG: 40 INJECTION, SUSPENSION INTRA-ARTICULAR; INTRAMUSCULAR at 09:04

## 2025-04-21 NOTE — PROGRESS NOTES
4/21/2025    Chief Complaint:  Chief Complaint   Patient presents with    Right Wrist - Pain, Numbness       HPI:  Srinivasan Bernal III is a 79 y.o. male, who presents to clinic today he was noted have some pain as well as some numbness and tingling to his right arm.  There was concern over some carpal tunnel syndrome and cubital tunnel syndrome.  There was some see 7 T1 compression noted and he did have a steroid injection by pain management.  He states that that did give him some relief of his symptoms.  He is here today to discuss further treatment for this    PMHX:  Past Medical History:   Diagnosis Date    Cataracts, bilateral     2009, 2013    Coronary artery disease 2004    Hypercholesteremia     Kidney stone 2015    Urerteroscopy    Skin cancer 2015    left ankle/ left ear       PSHX:  Past Surgical History:   Procedure Laterality Date    ANKLE FUSION      CIRCUMCISION      COLONOSCOPY N/A 09/20/2022    Procedure: COLONOSCOPY;  Surgeon: Natasha Sommer MD;  Location: [facility] ENDO;  Service: Endoscopy;  Laterality: N/A;    EPIDURAL STEROID INJECTION INTO CERVICAL SPINE N/A 2/26/2025    Procedure: Injection-steroid-epidural-cervical    C7/T1;  Surgeon: Jordin Huggins MD;  Location: [facility] OR;  Service: Pain Management;  Laterality: N/A;  normal    FOOT SURGERY      HERNIA REPAIR Left 2008    KNEE ARTHROSCOPY Left 2010    KNEE ARTHROSCOPY Right 2010    KNEE SURGERY Left 2010    partial replacement    KNEE SURGERY Right 2012    replacement    MOUTH SURGERY      STENT, DRUG ELUTING, SINGLE VESSEL, CORONARY      TOE SURGERY      TONSILLECTOMY         FMHX:  Family History   Problem Relation Name Age of Onset    Heart disease Father      Dementia Mother         SOCHX:  Social History     Tobacco Use    Smoking status: Some Days     Types: Cigars    Smokeless tobacco: Never   Substance Use Topics    Alcohol use: Yes     Comment: rarely       ALLERGIES:  Patient has no known allergies.    CURRENT  MEDICATIONS:  Medications Ordered Prior to Encounter[1]    REVIEW OF SYSTEMS:  Review of Systems   Constitutional: Negative.    HENT: Negative.     Eyes: Negative.    Respiratory: Negative.     Cardiovascular: Negative.    Gastrointestinal: Negative.    Genitourinary: Negative.    Musculoskeletal:  Positive for joint pain.   Skin: Negative.    Neurological:  Positive for tingling and weakness.   Endo/Heme/Allergies: Negative.    Psychiatric/Behavioral: Negative.       GENERAL PHYSICAL EXAM:   There were no vitals taken for this visit.   GEN: well developed, well nourished, no acute distress   HENT: Normocephalic, atraumatic   EYES: No discharge, conjunctiva normal   NECK: Supple, non-tender   PULM: No wheezing, no respiratory distress   CV: RRR   ABD: Soft, non-tender    ORTHO EXAM:   Examination of the right hand and wrist reveals that there is no edema.  He does have changes consistent with arthritis.  Palpation does not produce a significant amount of tenderness.  He does report intact sensation in the median radial and the ulnar distributions.  Has negative Tinel's over the carpal tunnel, Guyon's canal, and the cubital tunnel.  Has mildly positive Durkan's test at the wrist.  He does have a 2+ radial pulse.    RADIOLOGY:   EMG/nerve conduction study has been reviewed.  It is consistent with moderate to severe right carpal tunnel syndrome as well as some evidence of cubital tunnel syndrome    ASSESSMENT:   Right carpal tunnel syndrome and possible mild cubital tunnel syndrome    PLAN:  1. After consent was obtained injection was placed to the right carpal tunnel.  We will use this both diagnostically and therapeutically.  Based off of his response to the carpal tunnel injection I will discuss further treatment for the carpal tunnel versus further workup and treatment for the ulnar nerve.      2.  He will follow up in 2-3 months        [1]   Current Outpatient Medications on File Prior to Visit   Medication Sig  Dispense Refill    aspirin (ECOTRIN) 81 MG EC tablet Take 81 mg by mouth once daily.      atorvastatin (LIPITOR) 80 MG tablet Take 1 tablet (80 mg total) by mouth every evening. 90 tablet 2    ezetimibe (ZETIA) 10 mg tablet TAKE 1 TABLET ONE TIME DAILY 90 tablet 3    famotidine (PEPCID) 40 MG tablet Take 1 tablet by mouth once daily.      ibuprofen (ADVIL,MOTRIN) 200 MG tablet Take 200 mg by mouth every 6 (six) hours as needed for Pain.      ipratropium (ATROVENT) 42 mcg (0.06 %) nasal spray SMARTSI-2 Spray(s) Both Nares 1-3 Times Daily      perindopril erbumine (ACEON) 4 mg tablet TAKE 1 TABLET EVERY DAY 90 tablet 2    methylPREDNISolone (MEDROL DOSEPACK) 4 mg tablet use as directed (Patient not taking: Reported on 1/10/2025) 21 tablet 0    naproxen (NAPROSYN) 500 MG tablet Take 1 tablet (500 mg total) by mouth 2 (two) times daily as needed. 45 tablet 1    tadalafiL (CIALIS) 20 MG Tab Take 1 tablet (20 mg total) by mouth once daily. 30 tablet 11     No current facility-administered medications on file prior to visit.

## 2025-04-21 NOTE — TELEPHONE ENCOUNTER
----- Message from Agueda sent at 4/21/2025 10:35 AM CDT -----  Regarding: Knee Brace  Patient states Dr. Goldberg prescribed a knee brace for him about 2 years ago. He would like Dr. Goldberg to prescriibe another brace for him. He can be reached at [phone].

## 2025-04-23 RX ORDER — TRIAMCINOLONE ACETONIDE 40 MG/ML
40 INJECTION, SUSPENSION INTRA-ARTICULAR; INTRAMUSCULAR
Status: DISCONTINUED | OUTPATIENT
Start: 2025-04-21 | End: 2025-04-23 | Stop reason: HOSPADM

## 2025-04-23 NOTE — PROCEDURES
Carpal Tunnel: R carpal tunnel    Date/Time: 4/21/2025 9:20 AM    Performed by: Long Villavicencio MD  Authorized by: Long Villavicencio MD    Consent Done?:  Yes (Verbal)  Indications:  Pain  Site marked: the procedure site was marked    Timeout: prior to procedure the correct patient, procedure, and site was verified    Prep: patient was prepped and draped in usual sterile fashion      Local anesthesia used?: Yes    Local anesthetic:  Lidocaine 1% without epinephrine  Anesthetic total (ml):  0.5    Location:  Wrist (Right carpal tunnel)  Site:  R carpal tunnel  Needle size:  25 G  Medications:  40 mg triamcinolone acetonide 40 mg/mL (20 mg injected)  Patient tolerance:  Patient tolerated the procedure well with no immediate complications

## 2025-05-08 ENCOUNTER — OFFICE VISIT (OUTPATIENT)
Dept: ORTHOPEDICS | Facility: CLINIC | Age: 80
End: 2025-05-08
Payer: MEDICARE

## 2025-05-08 VITALS
HEIGHT: 72 IN | BODY MASS INDEX: 22.52 KG/M2 | SYSTOLIC BLOOD PRESSURE: 102 MMHG | DIASTOLIC BLOOD PRESSURE: 65 MMHG | HEART RATE: 70 BPM | WEIGHT: 166.25 LBS

## 2025-05-08 DIAGNOSIS — M25.461 EFFUSION OF RIGHT KNEE: ICD-10-CM

## 2025-05-08 DIAGNOSIS — Z96.651 S/P TOTAL KNEE ARTHROPLASTY, RIGHT: Primary | ICD-10-CM

## 2025-05-08 DIAGNOSIS — M54.50 LUMBAR BACK PAIN: ICD-10-CM

## 2025-05-08 PROCEDURE — 3078F DIAST BP <80 MM HG: CPT | Mod: CPTII,HCNC,S$GLB, | Performed by: NURSE PRACTITIONER

## 2025-05-08 PROCEDURE — 1160F RVW MEDS BY RX/DR IN RCRD: CPT | Mod: CPTII,HCNC,S$GLB, | Performed by: NURSE PRACTITIONER

## 2025-05-08 PROCEDURE — 99214 OFFICE O/P EST MOD 30 MIN: CPT | Mod: HCNC,S$GLB,, | Performed by: NURSE PRACTITIONER

## 2025-05-08 PROCEDURE — 1159F MED LIST DOCD IN RCRD: CPT | Mod: CPTII,HCNC,S$GLB, | Performed by: NURSE PRACTITIONER

## 2025-05-08 PROCEDURE — 99999 PR PBB SHADOW E&M-EST. PATIENT-LVL III: CPT | Mod: PBBFAC,HCNC,, | Performed by: NURSE PRACTITIONER

## 2025-05-08 PROCEDURE — 1125F AMNT PAIN NOTED PAIN PRSNT: CPT | Mod: CPTII,HCNC,S$GLB, | Performed by: NURSE PRACTITIONER

## 2025-05-08 PROCEDURE — 3074F SYST BP LT 130 MM HG: CPT | Mod: CPTII,HCNC,S$GLB, | Performed by: NURSE PRACTITIONER

## 2025-05-08 PROCEDURE — 3288F FALL RISK ASSESSMENT DOCD: CPT | Mod: CPTII,HCNC,S$GLB, | Performed by: NURSE PRACTITIONER

## 2025-05-08 PROCEDURE — 1101F PT FALLS ASSESS-DOCD LE1/YR: CPT | Mod: CPTII,HCNC,S$GLB, | Performed by: NURSE PRACTITIONER

## 2025-05-08 NOTE — PROGRESS NOTES
Chief Complaint   Patient presents with    Right Knee - Pain       History of Present Illness    Srinviasan presents for follow-up regarding his right knee replacement. His right knee swells up and is mildly tender. He wears a knee brace, which helps reduce swelling. He reports that after wearing the brace for a few days, the swelling reduces by about one-third to two-thirds, and he experiences improved range of motion. He previously played pickleball but has stopped due to knee issues. Both knees have been replaced, with the left being a partial replacement that functions well. The right knee has not fully recovered after one surgery. His last visit was in 2022 or 2023, where he was informed that treatment options for his knee were limited at that time. He inquires about obtaining a back brace for lumbar support. He has a back brace but states it is old from the 1970.     He denies any medical diagnoses.      ROS:  Musculoskeletal: +joint pain, +joint swelling, +back pain             Past Medical History:   Diagnosis Date    Cataracts, bilateral     2009, 2013    Coronary artery disease 2004    Hypercholesteremia     Kidney stone 2015    Urerteroscopy    Skin cancer 2015    left ankle/ left ear     Past Surgical History:   Procedure Laterality Date    ANKLE FUSION      CIRCUMCISION      COLONOSCOPY N/A 09/20/2022    Procedure: COLONOSCOPY;  Surgeon: Natasha Sommer MD;  Location: [facility] ENDO;  Service: Endoscopy;  Laterality: N/A;    EPIDURAL STEROID INJECTION INTO CERVICAL SPINE N/A 2/26/2025    Procedure: Injection-steroid-epidural-cervical    C7/T1;  Surgeon: Jordin Huggins MD;  Location: [facility] OR;  Service: Pain Management;  Laterality: N/A;  normal    FOOT SURGERY      HERNIA REPAIR Left 2008    KNEE ARTHROSCOPY Left 2010    KNEE ARTHROSCOPY Right 2010    KNEE SURGERY Left 2010    partial replacement    KNEE SURGERY Right 2012    replacement    MOUTH SURGERY      STENT, DRUG ELUTING, SINGLE VESSEL, CORONARY       TOE SURGERY      TONSILLECTOMY       Medications Ordered Prior to Encounter[1]  Review of patient's allergies indicates:  No Known Allergies  Family History   Problem Relation Name Age of Onset    Heart disease Father      Dementia Mother       Social History[2]    Review of Systems:  Constitutional:  Denies fever or chills   Eyes:  Denies change in visual acuity   HENT:  Denies nasal congestion or sore throat   Respiratory:  Denies cough or shortness of breath   Cardiovascular:  Denies chest pain or edema   GI:  Denies abdominal pain, nausea, vomiting, bloody stools or diarrhea   :  Denies dysuria   Integument:  Denies rash   Neurologic:  Denies headache, focal weakness or sensory changes   Endocrine:  Denies polyuria or polydipsia   Lymphatic:  Denies swollen glands   Psychiatric:  Denies depression or anxiety     Physical Exam:   Constitutional:  Well developed, well nourished, no acute distress, non-toxic appearance   Integument:  Well hydrated  Neurologic:  Alert & oriented x 3  Psychiatric:  Speech and behavior appropriate     Bilateral Knee Exam    Right Knee Exam     Tenderness   The patient is experiencing tenderness in the medial joint line.    Range of Motion   Extension: abnormal   Flexion: abnormal     Muscle Strength     The patient has normal knee strength.    Tests   Karen:  Medial - positive   Lachman:  Anterior - negative      Varus: negative  Valgus: negative  Patellar Apprehension: negative    Other   Erythema: absent  Sensation: normal  Pulse: present  Swelling: moderate      Left Knee Exam   Left knee exam performed same as contralateral side and is normal.              S/P total knee arthroplasty, right    Lumbar back pain  -     HME - OTHER    Effusion of right knee        - Srinivasan expressed interest in getting a new knee brace due to swelling issues.  - Use knee brace as needed for right knee swelling and support.  - Indicated ability to order a back brace for lumbar support.  - Use back  brace for lumbar support.     Return to clinic as needed.                [1]   Current Outpatient Medications on File Prior to Visit   Medication Sig Dispense Refill    aspirin (ECOTRIN) 81 MG EC tablet Take 81 mg by mouth once daily.      atorvastatin (LIPITOR) 80 MG tablet Take 1 tablet (80 mg total) by mouth every evening. 90 tablet 2    ezetimibe (ZETIA) 10 mg tablet TAKE 1 TABLET ONE TIME DAILY 90 tablet 3    famotidine (PEPCID) 40 MG tablet Take 1 tablet by mouth once daily.      ibuprofen (ADVIL,MOTRIN) 200 MG tablet Take 200 mg by mouth every 6 (six) hours as needed for Pain.      ipratropium (ATROVENT) 42 mcg (0.06 %) nasal spray SMARTSI-2 Spray(s) Both Nares 1-3 Times Daily      perindopril erbumine (ACEON) 4 mg tablet TAKE 1 TABLET EVERY DAY 90 tablet 2    methylPREDNISolone (MEDROL DOSEPACK) 4 mg tablet use as directed (Patient not taking: Reported on 1/10/2025) 21 tablet 0    naproxen (NAPROSYN) 500 MG tablet Take 1 tablet (500 mg total) by mouth 2 (two) times daily as needed. 45 tablet 1    tadalafiL (CIALIS) 20 MG Tab Take 1 tablet (20 mg total) by mouth once daily. 30 tablet 11     No current facility-administered medications on file prior to visit.   [2]   Social History  Socioeconomic History    Marital status:    Tobacco Use    Smoking status: Some Days     Types: Cigars    Smokeless tobacco: Never   Substance and Sexual Activity    Alcohol use: Yes     Comment: rarely    Drug use: No    Sexual activity: Yes     Social Drivers of Health     Financial Resource Strain: Low Risk  (2025)    Overall Financial Resource Strain (CARDIA)     Difficulty of Paying Living Expenses: Not hard at all   Food Insecurity: No Food Insecurity (2025)    Hunger Vital Sign     Worried About Running Out of Food in the Last Year: Never true     Ran Out of Food in the Last Year: Never true   Transportation Needs: No Transportation Needs (2025)    PRAPARE - Transportation     Lack of Transportation  (Medical): No     Lack of Transportation (Non-Medical): No   Physical Activity: Sufficiently Active (4/14/2025)    Exercise Vital Sign     Days of Exercise per Week: 7 days     Minutes of Exercise per Session: 60 min   Stress: No Stress Concern Present (4/14/2025)    [employer] [place] [employer] - Occupational Stress Questionnaire     Feeling of Stress : Not at all   Housing Stability: Low Risk  (4/14/2025)    Housing Stability Vital Sign     Unable to Pay for Housing in the Last Year: No     Number of Times Moved in the Last Year: 0     Homeless in the Last Year: No

## 2025-05-22 ENCOUNTER — PATIENT MESSAGE (OUTPATIENT)
Dept: FAMILY MEDICINE | Facility: CLINIC | Age: 80
End: 2025-05-22
Payer: MEDICARE

## 2025-05-26 ENCOUNTER — HOSPITAL ENCOUNTER (OUTPATIENT)
Dept: RADIOLOGY | Facility: HOSPITAL | Age: 80
Discharge: HOME | End: 2025-05-26
Attending: PHYSICIAN ASSISTANT
Payer: MEDICARE

## 2025-05-26 ENCOUNTER — OFFICE VISIT (OUTPATIENT)
Dept: FAMILY MEDICINE | Facility: CLINIC | Age: 80
End: 2025-05-26
Payer: MEDICARE

## 2025-05-26 ENCOUNTER — RESULTS FOLLOW-UP (OUTPATIENT)
Dept: FAMILY MEDICINE | Facility: CLINIC | Age: 80
End: 2025-05-26

## 2025-05-26 ENCOUNTER — TELEPHONE (OUTPATIENT)
Dept: PAIN MEDICINE | Facility: CLINIC | Age: 80
End: 2025-05-26
Payer: MEDICARE

## 2025-05-26 VITALS
DIASTOLIC BLOOD PRESSURE: 68 MMHG | WEIGHT: 168 LBS | HEIGHT: 72 IN | OXYGEN SATURATION: 96 % | SYSTOLIC BLOOD PRESSURE: 102 MMHG | BODY MASS INDEX: 22.75 KG/M2 | HEART RATE: 65 BPM

## 2025-05-26 DIAGNOSIS — M54.50 ACUTE RIGHT-SIDED LOW BACK PAIN WITHOUT SCIATICA: ICD-10-CM

## 2025-05-26 DIAGNOSIS — M54.50 ACUTE RIGHT-SIDED LOW BACK PAIN WITHOUT SCIATICA: Primary | ICD-10-CM

## 2025-05-26 DIAGNOSIS — I10 ESSENTIAL HYPERTENSION: ICD-10-CM

## 2025-05-26 PROCEDURE — 1125F AMNT PAIN NOTED PAIN PRSNT: CPT | Mod: CPTII,S$GLB,, | Performed by: PHYSICIAN ASSISTANT

## 2025-05-26 PROCEDURE — 1101F PT FALLS ASSESS-DOCD LE1/YR: CPT | Mod: CPTII,S$GLB,, | Performed by: PHYSICIAN ASSISTANT

## 2025-05-26 PROCEDURE — 72100 X-RAY EXAM L-S SPINE 2/3 VWS: CPT | Mod: 26,,, | Performed by: RADIOLOGY

## 2025-05-26 PROCEDURE — 3288F FALL RISK ASSESSMENT DOCD: CPT | Mod: CPTII,S$GLB,, | Performed by: PHYSICIAN ASSISTANT

## 2025-05-26 PROCEDURE — 99214 OFFICE O/P EST MOD 30 MIN: CPT | Mod: S$GLB,,, | Performed by: PHYSICIAN ASSISTANT

## 2025-05-26 PROCEDURE — 3074F SYST BP LT 130 MM HG: CPT | Mod: CPTII,S$GLB,, | Performed by: PHYSICIAN ASSISTANT

## 2025-05-26 PROCEDURE — 72100 X-RAY EXAM L-S SPINE 2/3 VWS: CPT | Mod: TC,FY,PO

## 2025-05-26 PROCEDURE — 99999 PR PBB SHADOW E&M-EST. PATIENT-LVL IV: CPT | Mod: PBBFAC,,, | Performed by: PHYSICIAN ASSISTANT

## 2025-05-26 PROCEDURE — 3078F DIAST BP <80 MM HG: CPT | Mod: CPTII,S$GLB,, | Performed by: PHYSICIAN ASSISTANT

## 2025-05-26 RX ORDER — METHOCARBAMOL 750 MG/1
750 TABLET, FILM COATED ORAL 4 TIMES DAILY PRN
Qty: 40 TABLET | Refills: 0 | Status: SHIPPED | OUTPATIENT
Start: 2025-05-26 | End: 2025-06-05

## 2025-05-26 NOTE — TELEPHONE ENCOUNTER
Spoke with patient and his lower back pain and sciatica has flared up to an 8/10. Booked follow up with Kye mcdonnell tomorrow to discuss

## 2025-05-26 NOTE — PROGRESS NOTES
Subjective:      Patient ID: Srinivasan Bernal III is a 80 y.o. male.    Chief Complaint: Back Pain (X1 week Lower, right side of patients back. Patient states that His right hip has pain as well. ) and Hip Pain    Patient is new to me.    HPI  Patient's active medical issues include HTN, hypercholesterolemia.    Today patient reports intermittent continued right low back pain.  Worsening right low back pain for the week.  No injury or fall noted.  Standing and sleeping are hard.  Taken norco with relief.  Taken naproxen 500mg without relief.  Denies urinary/fecal incontinence.  Has seen pain management in the past for this.    Review of Systems   Respiratory:  Negative for shortness of breath.    Cardiovascular:  Negative for chest pain.   Gastrointestinal:  Negative for abdominal pain, blood in stool, constipation, diarrhea, nausea and vomiting.   Genitourinary:  Negative for dysuria, frequency and hematuria.   Musculoskeletal:  Positive for back pain.       Objective:   /68   Pulse 65   Ht 6' (1.829 m)   Wt 76.2 kg (167 lb 15.9 oz)   SpO2 96%   BMI 22.78 kg/m²     Physical Exam  Vitals reviewed.   Constitutional:       Appearance: Normal appearance. He is well-developed.   HENT:      Head: Normocephalic and atraumatic.      Right Ear: External ear normal.      Left Ear: External ear normal.   Eyes:      Conjunctiva/sclera: Conjunctivae normal.   Cardiovascular:      Rate and Rhythm: Normal rate and regular rhythm.      Heart sounds: Normal heart sounds. No murmur heard.     No friction rub. No gallop.   Pulmonary:      Effort: Pulmonary effort is normal. No respiratory distress.      Breath sounds: Normal breath sounds. No wheezing, rhonchi or rales.   Musculoskeletal:         General: Normal range of motion.      Cervical back: No bony tenderness.      Thoracic back: No bony tenderness.      Lumbar back: Tenderness (right-sided) present. No bony tenderness. Negative right straight leg raise test and  negative left straight leg raise test.   Skin:     General: Skin is warm and dry.      Findings: No rash.   Neurological:      General: No focal deficit present.      Mental Status: He is alert and oriented to person, place, and time.   Psychiatric:         Mood and Affect: Mood normal.         Behavior: Behavior normal.         Judgment: Judgment normal.       Assessment:      1. Acute right-sided low back pain without sciatica    2. Essential hypertension       Plan:   1. Acute right-sided low back pain without sciatica (Primary)  Take fall precautions while on robaxin.  - X-Ray Lumbar Spine AP And Lateral; Future  - Ambulatory referral/consult to Pain Clinic; Future  - methocarbamoL (ROBAXIN) 750 MG Tab; Take 1 tablet (750 mg total) by mouth 4 (four) times daily as needed (muscle pain).  Dispense: 40 tablet; Refill: 0    2. Essential hypertension  Controlled.    Follow up as scheduled.  Patient agreed with plan and expressed understanding.    Thank you for allowing me to serve you,

## 2025-05-26 NOTE — TELEPHONE ENCOUNTER
----- Message from Agueda sent at 5/26/2025  3:19 PM CDT -----  Regarding: Appointment  Patient was referred to Dr. Huggins by Lety Koch for lower back pain and right hip. Patient is requesting first available appointment. Patient will look for appointment in the portal. For questions he can be reached at [phone].

## 2025-05-27 ENCOUNTER — OFFICE VISIT (OUTPATIENT)
Dept: PAIN MEDICINE | Facility: CLINIC | Age: 80
End: 2025-05-27
Payer: MEDICARE

## 2025-05-27 ENCOUNTER — TELEPHONE (OUTPATIENT)
Dept: PAIN MEDICINE | Facility: CLINIC | Age: 80
End: 2025-05-27

## 2025-05-27 VITALS
WEIGHT: 167 LBS | DIASTOLIC BLOOD PRESSURE: 70 MMHG | SYSTOLIC BLOOD PRESSURE: 112 MMHG | BODY MASS INDEX: 22.65 KG/M2 | HEART RATE: 68 BPM

## 2025-05-27 DIAGNOSIS — M54.16 LUMBAR RADICULOPATHY: Primary | ICD-10-CM

## 2025-05-27 DIAGNOSIS — M48.061 SPINAL STENOSIS OF LUMBAR REGION WITHOUT NEUROGENIC CLAUDICATION: ICD-10-CM

## 2025-05-27 DIAGNOSIS — M48.061 SPINAL STENOSIS OF LUMBAR REGION, UNSPECIFIED WHETHER NEUROGENIC CLAUDICATION PRESENT: ICD-10-CM

## 2025-05-27 DIAGNOSIS — M47.816 LUMBAR SPONDYLOSIS: ICD-10-CM

## 2025-05-27 DIAGNOSIS — M54.16 LUMBAR RADICULOPATHY, CHRONIC: ICD-10-CM

## 2025-05-27 DIAGNOSIS — M54.50 ACUTE RIGHT-SIDED LOW BACK PAIN WITHOUT SCIATICA: ICD-10-CM

## 2025-05-27 PROCEDURE — 1125F AMNT PAIN NOTED PAIN PRSNT: CPT | Mod: CPTII,S$GLB,,

## 2025-05-27 PROCEDURE — 3288F FALL RISK ASSESSMENT DOCD: CPT | Mod: CPTII,S$GLB,,

## 2025-05-27 PROCEDURE — 1101F PT FALLS ASSESS-DOCD LE1/YR: CPT | Mod: CPTII,S$GLB,,

## 2025-05-27 PROCEDURE — 99214 OFFICE O/P EST MOD 30 MIN: CPT | Mod: S$GLB,,,

## 2025-05-27 PROCEDURE — 3074F SYST BP LT 130 MM HG: CPT | Mod: CPTII,S$GLB,,

## 2025-05-27 PROCEDURE — 99999 PR PBB SHADOW E&M-EST. PATIENT-LVL III: CPT | Mod: PBBFAC,,,

## 2025-05-27 PROCEDURE — 3078F DIAST BP <80 MM HG: CPT | Mod: CPTII,S$GLB,,

## 2025-05-27 PROCEDURE — 1159F MED LIST DOCD IN RCRD: CPT | Mod: CPTII,S$GLB,,

## 2025-05-27 RX ORDER — GABAPENTIN 300 MG/1
300 CAPSULE ORAL 2 TIMES DAILY
Qty: 60 CAPSULE | Refills: 1 | Status: SHIPPED | OUTPATIENT
Start: 2025-05-27 | End: 2025-06-26

## 2025-05-27 RX ORDER — ALPRAZOLAM 1 MG/1
1 TABLET, ORALLY DISINTEGRATING ORAL ONCE AS NEEDED
OUTPATIENT
Start: 2025-05-27 | End: 2036-10-23

## 2025-05-27 NOTE — H&P (VIEW-ONLY)
[facility]      PCP:   Ray Anna MD    CC:   Chief Complaint   Patient presents with    Back Pain          5/27/2025     1:07 PM 3/19/2025     8:34 AM 1/31/2025     1:02 PM   Last 3 PDI Scores   Pain Disability Index (PDI) 41 6 10     Interval HPI 5/27/2025: Srinivasan Bernal III returns to the office for follow up.  Today he is reporting worsening lower back pain, 10/10, across his lower back with radiation into right buttock, right lateral hip, constant, sharp, stabbing.  He denies any associated numbness, weakness or any new changes to his bowel or bladder function.  He is not finding relief with at-home PT directed exercises, NSAIDs and muscle relaxers.      Pain Intervention History:  - s/p cervical C7-T1 MARITZA on 2/26/25    Past Spine Surgical History:  none      HPI:   Mr. Mai returns for follow up of back pain after PT.  Last seen right lower back and buttock pain that increased with walking and improved with sitting.  He compelded medrol taper and has been working with PT.  While PT has helped improve, but not resolve right lower back pain, he has developed weakness in the right quadriceps.  PT contacted me 5/29/23 about acute onset weakness in the right quadriceps.  Mr. Mai has noticed diffculty stepping up onto a curb because of the weakness.    He has some history of right knee welling since a replacement; he had an xray of the knee and is scheduled to see orthopedics soon for further assessment.     Initial HPI:  Srinivasan Bernal III is a 80 y.o. male with history of CAD presents with lower back pain.  He has had similar pain in 2007 and 2011 with benefit from PT in the past.  Current pain started 3/22/23 without triggering event.  He has pain in the right lower lumbar region/ buttock.  No radicular leg pain, numbness or tignling.  Pain is worse with walking and improves with sitting.  No pain with laying down.  Has tried ibuprofen.    He had MRI and xray in the past showing  right L5 pars deffect and anterolisthesis of L5 on S1.      Past and current medications:  Antineuropathics:  NSAIDs:  ibuprofen  Antidepressants:  Muscle relaxers:  Opioids:  Antiplatelets/Anticoagulants:  ASA  Others: completed medrol taper    Physical therapy/ Chiropractic care:  PT in the past and currently going with benefit      History:    Current Outpatient Medications:     aspirin (ECOTRIN) 81 MG EC tablet, Take 81 mg by mouth once daily., Disp: , Rfl:     atorvastatin (LIPITOR) 80 MG tablet, Take 1 tablet (80 mg total) by mouth every evening., Disp: 90 tablet, Rfl: 2    ezetimibe (ZETIA) 10 mg tablet, TAKE 1 TABLET ONE TIME DAILY, Disp: 90 tablet, Rfl: 3    famotidine (PEPCID) 40 MG tablet, Take 1 tablet by mouth once daily., Disp: , Rfl:     ibuprofen (ADVIL,MOTRIN) 200 MG tablet, Take 200 mg by mouth every 6 (six) hours as needed for Pain., Disp: , Rfl:     ipratropium (ATROVENT) 42 mcg (0.06 %) nasal spray, SMARTSI-2 Spray(s) Both Nares 1-3 Times Daily, Disp: , Rfl:     methocarbamoL (ROBAXIN) 750 MG Tab, Take 1 tablet (750 mg total) by mouth 4 (four) times daily as needed (muscle pain)., Disp: 40 tablet, Rfl: 0    perindopril erbumine (ACEON) 4 mg tablet, TAKE 1 TABLET EVERY DAY, Disp: 90 tablet, Rfl: 2    gabapentin (NEURONTIN) 300 MG capsule, Take 1 capsule (300 mg total) by mouth 2 (two) times daily., Disp: 60 capsule, Rfl: 01    methylPREDNISolone (MEDROL DOSEPACK) 4 mg tablet, use as directed (Patient not taking: Reported on 1/10/2025), Disp: 21 tablet, Rfl: 0    naproxen (NAPROSYN) 500 MG tablet, Take 1 tablet (500 mg total) by mouth 2 (two) times daily as needed., Disp: 45 tablet, Rfl: 1    tadalafiL (CIALIS) 20 MG Tab, Take 1 tablet (20 mg total) by mouth once daily., Disp: 30 tablet, Rfl: 11    Past Medical History:   Diagnosis Date    Cataracts, bilateral     ,     Coronary artery disease     Hypercholesteremia     Kidney stone 2015    Urerteroscopy    Skin cancer 2015    left  ankle/ left ear       Past Surgical History:   Procedure Laterality Date    ANKLE FUSION      CIRCUMCISION      COLONOSCOPY N/A 09/20/2022    Procedure: COLONOSCOPY;  Surgeon: Natasha Sommer MD;  Location: [facility] ENDO;  Service: Endoscopy;  Laterality: N/A;    EPIDURAL STEROID INJECTION INTO CERVICAL SPINE N/A 2/26/2025    Procedure: Injection-steroid-epidural-cervical    C7/T1;  Surgeon: Jordin Huggins MD;  Location: [facility] OR;  Service: Pain Management;  Laterality: N/A;  normal    FOOT SURGERY      HERNIA REPAIR Left 2008    KNEE ARTHROSCOPY Left 2010    KNEE ARTHROSCOPY Right 2010    KNEE SURGERY Left 2010    partial replacement    KNEE SURGERY Right 2012    replacement    MOUTH SURGERY      STENT, DRUG ELUTING, SINGLE VESSEL, CORONARY      TOE SURGERY      TONSILLECTOMY         Family History   Problem Relation Name Age of Onset    Heart disease Father      Dementia Mother         Social History     Socioeconomic History    Marital status:    Tobacco Use    Smoking status: Some Days     Types: Cigars    Smokeless tobacco: Never   Substance and Sexual Activity    Alcohol use: Yes     Comment: rarely    Drug use: No    Sexual activity: Yes     Social Drivers of Health     Financial Resource Strain: Low Risk  (4/14/2025)    Overall Financial Resource Strain (CARDIA)     Difficulty of Paying Living Expenses: Not hard at all   Food Insecurity: No Food Insecurity (4/14/2025)    Hunger Vital Sign     Worried About Running Out of Food in the Last Year: Never true     Ran Out of Food in the Last Year: Never true   Transportation Needs: No Transportation Needs (4/14/2025)    PRAPARE - Transportation     Lack of Transportation (Medical): No     Lack of Transportation (Non-Medical): No   Physical Activity: Sufficiently Active (4/14/2025)    Exercise Vital Sign     Days of Exercise per Week: 7 days     Minutes of Exercise per Session: 60 min   Stress: No Stress Concern Present (4/14/2025)    [employer] [place] [employer]  [employer] - Occupational Stress Questionnaire     Feeling of Stress : Not at all   Housing Stability: Low Risk  (4/14/2025)    Housing Stability Vital Sign     Unable to Pay for Housing in the Last Year: No     Number of Times Moved in the Last Year: 0     Homeless in the Last Year: No       Review of patient's allergies indicates:  No Known Allergies    Review of Systems:  Right low back/ buttock pain.  Balance of review of systems is negative.    Physical Exam:  Vitals:    05/27/25 1308   BP: 112/70   Pulse: 68   Weight: 75.8 kg (167 lb)   PainSc:   9   PainLoc: Back           Body mass index is 22.65 kg/m².      Gen: A and O x3, pleasant, well-groomed  Skin: No rashes or obvious lesions  HEENT: PERRLA, no obvious deformities on ears or in canals.Trachea midline.  CVS: Regular rate and rhythm, normal palpable pulses.  Resp: Clear to auscultation bilaterally, no wheezes or rales.  Abdomen: Soft, NT/ND.  Musculoskeletal: . No antalgic gait.     Neuro:      Neuro:    Iliopsoas Quadriceps Knee  Flexion Tibialis  anterior Gastro- cnemius EHL   Lower: R 5/5 5/5 5/5 5/5 5/5 5/5    L 5/5 5/5 5/5 5/5 5/5 5/5      Left  Right    Patellar DTR 0+ 0+   Achilles DTR 0+ 0+   Ramirez     Clonus     Babinski       Intact and symmetrical to light touch and pinprick in L1-S1 dermatomes bilaterally.    Lumbar spine:  Lumbar spine: ROM is full with flexion extension and oblique extension with no increased pain.    Michoacano's test causes no increased pain on either side.    Supine straight leg raise is negative bilaterally.    Internal and external rotation of the hip causes no increased pain on either side.  Myofascial exam: No tenderness to palpation across lumbar paraspinous muscles.          Imaging:  MRI lumbar spine 6/8/23  FINDINGS:  Exaggerated generalized lumbar lordotic curvature L2-L5.  Kyphotic curvature T11-L2.  No acute fracture or destructive lesion evident.  Distal spinal cord and conus are grossly normal.      T11-T12 and T12-L1 demonstrate moderate disc space narrowing and mild disc bulge.  There is facet arthrosis at T11-T12 and mild foraminal narrowing.  L1-L2 severe disc space narrowing with near complete obliteration of the disc space, marginal retrolisthesis and osteophytic change along the endplate margin, mild moderate foraminal narrowing.  L2-L3 demonstrates moderate severe disc space narrowing, marginal retrolisthesis, moderate osteophytic change and desiccated disc bulge, moderate facet arthrosis, prominent ligament flavum thickening, moderate severe spinal canal narrowing with reduction of the AP diameter to 3-4 mm and near complete effacement of the CSF, moderate severe foraminal narrowing with nerve root distortion.  L3-L4 moderate disc space narrowing, desiccation, moderate severe disc bulge, moderate bilateral facet arthrosis with prominent ligament flavum thickening, mild moderate spinal canal narrowing reduction of the AP diameter to 5-6 mm, severe right-sided foraminal narrowing with nerve root impingement, asymmetric right-sided severe disc bulging.  L4-5 demonstrates severe disc space narrowing, 3-4 mm anterolisthesis, severe disc bulge with uncovering of the disc, severe facet arthrosis with osteophytic hypertrophy, bilateral lateral recess narrowing with suspected L5 nerve root impingement bilaterally, severe right-sided foraminal narrowing with nerve root impingement, moderate severe left foraminal narrowing with nerve root impingement.  L5-S1 demonstrates solid osseous fusion, anterolisthesis, moderate foraminal narrowing with nerve root distortion bilaterally.     Intra-abdominal organs and paraspinal soft tissues are normal.       MRI cervical spine 1/25/25  FINDINGS:  The visualized posterior fossa structures including the shanna, mid brain, and cerebellum are unremarkable.  No Chiari malformation.  The incidentally observed paranasal sinuses are clear.     The cervical vertebral bodies show  normal height and signal intensity without evidence of acute compression fracture or pathologic marrow replacement process.  No spondylolisthesis.     There is reversal of the normal cervical lordosis which could relate to neck muscle spasm.  There is degenerative disc desiccation at every cervical and visualized upper thoracic level.  There is anterior marginal osteophyte formation and degenerative disc space narrowing at C5-C6, C6-C7, and C7-T1.     The cervical spinal cord is normal in signal intensity without evidence of cord edema, myelomalacia, or cord syrinx.  No epidural fluid collections or masses.     The incidentally observed soft tissues of the neck show no significant abnormalities.     C2-C3: There is right facet arthropathy.  No disc protrusion or extrusion. No central canal stenosis or neuroforaminal stenosis.  C3-C4: There is bilateral hypertrophic degenerative facet arthropathy and uncovertebral spurring noted.  There is a posterior disc osteophyte complex and effacement of the anterior CSF sleeve.  There is severe bilateral neuroforaminal stenosis.  There is mild overall central canal stenosis.  C4-C5: There is bilateral uncovertebral spurring.  There is prominent left facet arthropathy, left greater than right.  There is severe left neuroforaminal stenosis.  There is, at most, mild right neuroforaminal stenosis.  There is effacement of the anterior CSF sleeve.  No central canal stenosis  C5-C6: There is a  posterior disc osteophyte complex and a superimposed broad right paracentral disc protrusion which exerts mass effect on the right paracentral ventral cervical spinal cord.  No abnormal cord signal.  There is bilateral facet arthropathy.  There is severe right neuroforaminal stenosis.  There is moderate-severe left neuroforaminal stenosis.  No overall central canal stenosis.  C6-C7: There is a broad left paracentral disc protrusion which indents the ventral cord.  No abnormal cord signal  "appreciated.  There is a possible 3 mm right foraminal disc protrusion visible on series 7, image 22.  This could produce symptoms referable to the right C7 nerve.  No left or right neuroforaminal stenosis.  No central canal stenosis.  There is mild prominence of the central canal of the spinal cord which measures up to 2 mm in AP dimension on series 7, image 22.  C7-T1: There is a bulging posterior disc osteophyte complex which effaces the anterior CSF sleeve and extends into both neural foramina.  There is severe bilateral neuroforaminal stenosis which could produce symptoms referable to the C8 nerves.  There is mild bilateral facet arthropathy.      Labs:  No results found for: "LABA1C", "HGBA1C"    Lab Results   Component Value Date    WBC 5.01 08/12/2024    HGB 13.6 (L) 08/12/2024    HCT 40.9 08/12/2024    MCV 93 08/12/2024     08/12/2024           Problem List Items Addressed This Visit    None  Visit Diagnoses         Lumbar radiculopathy    -  Primary      Acute right-sided low back pain without sciatica          Lumbar radiculopathy, chronic        Relevant Medications    gabapentin (NEURONTIN) 300 MG capsule      Spinal stenosis of lumbar region, unspecified whether neurogenic claudication present          Lumbar spondylosis          Spinal stenosis of lumbar region without neurogenic claudication                Assessment:       5/27/2025: Srinivasan Bernal III returns to the office for follow up.  Today he is reporting worsening lower back pain, 10/10, across his lower back with radiation into right buttock, right lateral hip, constant, sharp, stabbing.  He denies any associated numbness, weakness or any new changes to his bowel or bladder function.  He is not finding relief with at-home PT directed exercises, NSAIDs and muscle relaxers.      - on exam he has full strength in his lower extremities.  - we reviewed his lumbar MRI together in office today and is consistent with L3-L4  prominent " ligament flavum thickening, severe right-sided foraminal narrowing with nerve root impingement, asymmetric right-sided severe disc bulging. L4-5 bilateral lateral recess narrowing with suspected L5 nerve root impingement bilaterally, severe right-sided foraminal narrowing with nerve root impingement, moderate severe left foraminal narrowing with nerve root impingement.  - he is likely having worsening lumbar radicular pain, this pain is limiting his mobility interfering with his ADLs and quality of life.  He is not finding relief with NSAIDs, at-home PT directed exercises and muscle relaxers.  Pain is too severe to restart formal physical therapy.  - for any neuropathic component we will trial gabapentin 300 mg nightly for the 1st week and if tolerating increase to twice daily.  - for his severe lumbar radicular pain I would like to schedule him for an L4/5 interlaminar epidural steroid injection.  - we will need to hold aspirin and NSAIDs prior.  - follow up 3 weeks post procedure sooner if needed.  If he fails get relief with this injection we will update his lumbar MRI.      : Not applicable    This note was completed with dictation software and grammatical errors may exist.

## 2025-05-27 NOTE — TELEPHONE ENCOUNTER
Please schedule patient for the following procedure:    Physician - Dr Huggins      Type of Procedure/Injection - Lumbar Epidural  L4/5           Laterality - NA      Priority - Normal      Anxiolysis - Local      Fasting - NPO after midnight      Need to hold medication - Yes      Aspirin for 4 days, Meloxicam or Naprosyn for 4 days, and Diclofenac or Ketorolac or Ibuprofen for 1 day      Clearance needed - No      Follow up - 3 week

## 2025-05-27 NOTE — PROGRESS NOTES
[facility]      PCP:   Ray Anna MD    CC:   Chief Complaint   Patient presents with    Back Pain          5/27/2025     1:07 PM 3/19/2025     8:34 AM 1/31/2025     1:02 PM   Last 3 PDI Scores   Pain Disability Index (PDI) 41 6 10     Interval HPI 5/27/2025: Srinivasan Bernal III returns to the office for follow up.  Today he is reporting worsening lower back pain, 10/10, across his lower back with radiation into right buttock, right lateral hip, constant, sharp, stabbing.  He denies any associated numbness, weakness or any new changes to his bowel or bladder function.  He is not finding relief with at-home PT directed exercises, NSAIDs and muscle relaxers.      Pain Intervention History:  - s/p cervical C7-T1 MARITZA on 2/26/25    Past Spine Surgical History:  none      HPI:   Mr. Mai returns for follow up of back pain after PT.  Last seen right lower back and buttock pain that increased with walking and improved with sitting.  He compelded medrol taper and has been working with PT.  While PT has helped improve, but not resolve right lower back pain, he has developed weakness in the right quadriceps.  PT contacted me 5/29/23 about acute onset weakness in the right quadriceps.  Mr. Mai has noticed diffculty stepping up onto a curb because of the weakness.    He has some history of right knee welling since a replacement; he had an xray of the knee and is scheduled to see orthopedics soon for further assessment.     Initial HPI:  Srinivasan Bernal III is a 80 y.o. male with history of CAD presents with lower back pain.  He has had similar pain in 2007 and 2011 with benefit from PT in the past.  Current pain started 3/22/23 without triggering event.  He has pain in the right lower lumbar region/ buttock.  No radicular leg pain, numbness or tignling.  Pain is worse with walking and improves with sitting.  No pain with laying down.  Has tried ibuprofen.    He had MRI and xray in the past showing  right L5 pars deffect and anterolisthesis of L5 on S1.      Past and current medications:  Antineuropathics:  NSAIDs:  ibuprofen  Antidepressants:  Muscle relaxers:  Opioids:  Antiplatelets/Anticoagulants:  ASA  Others: completed medrol taper    Physical therapy/ Chiropractic care:  PT in the past and currently going with benefit      History:    Current Outpatient Medications:     aspirin (ECOTRIN) 81 MG EC tablet, Take 81 mg by mouth once daily., Disp: , Rfl:     atorvastatin (LIPITOR) 80 MG tablet, Take 1 tablet (80 mg total) by mouth every evening., Disp: 90 tablet, Rfl: 2    ezetimibe (ZETIA) 10 mg tablet, TAKE 1 TABLET ONE TIME DAILY, Disp: 90 tablet, Rfl: 3    famotidine (PEPCID) 40 MG tablet, Take 1 tablet by mouth once daily., Disp: , Rfl:     ibuprofen (ADVIL,MOTRIN) 200 MG tablet, Take 200 mg by mouth every 6 (six) hours as needed for Pain., Disp: , Rfl:     ipratropium (ATROVENT) 42 mcg (0.06 %) nasal spray, SMARTSI-2 Spray(s) Both Nares 1-3 Times Daily, Disp: , Rfl:     methocarbamoL (ROBAXIN) 750 MG Tab, Take 1 tablet (750 mg total) by mouth 4 (four) times daily as needed (muscle pain)., Disp: 40 tablet, Rfl: 0    perindopril erbumine (ACEON) 4 mg tablet, TAKE 1 TABLET EVERY DAY, Disp: 90 tablet, Rfl: 2    gabapentin (NEURONTIN) 300 MG capsule, Take 1 capsule (300 mg total) by mouth 2 (two) times daily., Disp: 60 capsule, Rfl: 01    methylPREDNISolone (MEDROL DOSEPACK) 4 mg tablet, use as directed (Patient not taking: Reported on 1/10/2025), Disp: 21 tablet, Rfl: 0    naproxen (NAPROSYN) 500 MG tablet, Take 1 tablet (500 mg total) by mouth 2 (two) times daily as needed., Disp: 45 tablet, Rfl: 1    tadalafiL (CIALIS) 20 MG Tab, Take 1 tablet (20 mg total) by mouth once daily., Disp: 30 tablet, Rfl: 11    Past Medical History:   Diagnosis Date    Cataracts, bilateral     ,     Coronary artery disease     Hypercholesteremia     Kidney stone 2015    Urerteroscopy    Skin cancer 2015    left  ankle/ left ear       Past Surgical History:   Procedure Laterality Date    ANKLE FUSION      CIRCUMCISION      COLONOSCOPY N/A 09/20/2022    Procedure: COLONOSCOPY;  Surgeon: Natasha Sommer MD;  Location: [facility] ENDO;  Service: Endoscopy;  Laterality: N/A;    EPIDURAL STEROID INJECTION INTO CERVICAL SPINE N/A 2/26/2025    Procedure: Injection-steroid-epidural-cervical    C7/T1;  Surgeon: Jordin Huggins MD;  Location: [facility] OR;  Service: Pain Management;  Laterality: N/A;  normal    FOOT SURGERY      HERNIA REPAIR Left 2008    KNEE ARTHROSCOPY Left 2010    KNEE ARTHROSCOPY Right 2010    KNEE SURGERY Left 2010    partial replacement    KNEE SURGERY Right 2012    replacement    MOUTH SURGERY      STENT, DRUG ELUTING, SINGLE VESSEL, CORONARY      TOE SURGERY      TONSILLECTOMY         Family History   Problem Relation Name Age of Onset    Heart disease Father      Dementia Mother         Social History     Socioeconomic History    Marital status:    Tobacco Use    Smoking status: Some Days     Types: Cigars    Smokeless tobacco: Never   Substance and Sexual Activity    Alcohol use: Yes     Comment: rarely    Drug use: No    Sexual activity: Yes     Social Drivers of Health     Financial Resource Strain: Low Risk  (4/14/2025)    Overall Financial Resource Strain (CARDIA)     Difficulty of Paying Living Expenses: Not hard at all   Food Insecurity: No Food Insecurity (4/14/2025)    Hunger Vital Sign     Worried About Running Out of Food in the Last Year: Never true     Ran Out of Food in the Last Year: Never true   Transportation Needs: No Transportation Needs (4/14/2025)    PRAPARE - Transportation     Lack of Transportation (Medical): No     Lack of Transportation (Non-Medical): No   Physical Activity: Sufficiently Active (4/14/2025)    Exercise Vital Sign     Days of Exercise per Week: 7 days     Minutes of Exercise per Session: 60 min   Stress: No Stress Concern Present (4/14/2025)    [employer] - Occupational Stress Questionnaire     Feeling of Stress : Not at all   Housing Stability: Low Risk  (4/14/2025)    Housing Stability Vital Sign     Unable to Pay for Housing in the Last Year: No     Number of Times Moved in the Last Year: 0     Homeless in the Last Year: No       Review of patient's allergies indicates:  No Known Allergies    Review of Systems:  Right low back/ buttock pain.  Balance of review of systems is negative.    Physical Exam:  Vitals:    05/27/25 1308   BP: 112/70   Pulse: 68   Weight: 75.8 kg (167 lb)   PainSc:   9   PainLoc: Back           Body mass index is 22.65 kg/m².      Gen: A and O x3, pleasant, well-groomed  Skin: No rashes or obvious lesions  HEENT: PERRLA, no obvious deformities on ears or in canals.Trachea midline.  CVS: Regular rate and rhythm, normal palpable pulses.  Resp: Clear to auscultation bilaterally, no wheezes or rales.  Abdomen: Soft, NT/ND.  Musculoskeletal: . No antalgic gait.     Neuro:      Neuro:    Iliopsoas Quadriceps Knee  Flexion Tibialis  anterior Gastro- cnemius EHL   Lower: R 5/5 5/5 5/5 5/5 5/5 5/5    L 5/5 5/5 5/5 5/5 5/5 5/5      Left  Right    Patellar DTR 0+ 0+   Achilles DTR 0+ 0+   Ramirez     Clonus     Babinski       Intact and symmetrical to light touch and pinprick in L1-S1 dermatomes bilaterally.    Lumbar spine:  Lumbar spine: ROM is full with flexion extension and oblique extension with no increased pain.    Michoacano's test causes no increased pain on either side.    Supine straight leg raise is negative bilaterally.    Internal and external rotation of the hip causes no increased pain on either side.  Myofascial exam: No tenderness to palpation across lumbar paraspinous muscles.          Imaging:  MRI lumbar spine 6/8/23  FINDINGS:  Exaggerated generalized lumbar lordotic curvature L2-L5.  Kyphotic curvature T11-L2.  No acute fracture or destructive lesion evident.  Distal spinal cord and conus are grossly normal.      T11-T12 and T12-L1 demonstrate moderate disc space narrowing and mild disc bulge.  There is facet arthrosis at T11-T12 and mild foraminal narrowing.  L1-L2 severe disc space narrowing with near complete obliteration of the disc space, marginal retrolisthesis and osteophytic change along the endplate margin, mild moderate foraminal narrowing.  L2-L3 demonstrates moderate severe disc space narrowing, marginal retrolisthesis, moderate osteophytic change and desiccated disc bulge, moderate facet arthrosis, prominent ligament flavum thickening, moderate severe spinal canal narrowing with reduction of the AP diameter to 3-4 mm and near complete effacement of the CSF, moderate severe foraminal narrowing with nerve root distortion.  L3-L4 moderate disc space narrowing, desiccation, moderate severe disc bulge, moderate bilateral facet arthrosis with prominent ligament flavum thickening, mild moderate spinal canal narrowing reduction of the AP diameter to 5-6 mm, severe right-sided foraminal narrowing with nerve root impingement, asymmetric right-sided severe disc bulging.  L4-5 demonstrates severe disc space narrowing, 3-4 mm anterolisthesis, severe disc bulge with uncovering of the disc, severe facet arthrosis with osteophytic hypertrophy, bilateral lateral recess narrowing with suspected L5 nerve root impingement bilaterally, severe right-sided foraminal narrowing with nerve root impingement, moderate severe left foraminal narrowing with nerve root impingement.  L5-S1 demonstrates solid osseous fusion, anterolisthesis, moderate foraminal narrowing with nerve root distortion bilaterally.     Intra-abdominal organs and paraspinal soft tissues are normal.       MRI cervical spine 1/25/25  FINDINGS:  The visualized posterior fossa structures including the shanna, mid brain, and cerebellum are unremarkable.  No Chiari malformation.  The incidentally observed paranasal sinuses are clear.     The cervical vertebral bodies show  normal height and signal intensity without evidence of acute compression fracture or pathologic marrow replacement process.  No spondylolisthesis.     There is reversal of the normal cervical lordosis which could relate to neck muscle spasm.  There is degenerative disc desiccation at every cervical and visualized upper thoracic level.  There is anterior marginal osteophyte formation and degenerative disc space narrowing at C5-C6, C6-C7, and C7-T1.     The cervical spinal cord is normal in signal intensity without evidence of cord edema, myelomalacia, or cord syrinx.  No epidural fluid collections or masses.     The incidentally observed soft tissues of the neck show no significant abnormalities.     C2-C3: There is right facet arthropathy.  No disc protrusion or extrusion. No central canal stenosis or neuroforaminal stenosis.  C3-C4: There is bilateral hypertrophic degenerative facet arthropathy and uncovertebral spurring noted.  There is a posterior disc osteophyte complex and effacement of the anterior CSF sleeve.  There is severe bilateral neuroforaminal stenosis.  There is mild overall central canal stenosis.  C4-C5: There is bilateral uncovertebral spurring.  There is prominent left facet arthropathy, left greater than right.  There is severe left neuroforaminal stenosis.  There is, at most, mild right neuroforaminal stenosis.  There is effacement of the anterior CSF sleeve.  No central canal stenosis  C5-C6: There is a  posterior disc osteophyte complex and a superimposed broad right paracentral disc protrusion which exerts mass effect on the right paracentral ventral cervical spinal cord.  No abnormal cord signal.  There is bilateral facet arthropathy.  There is severe right neuroforaminal stenosis.  There is moderate-severe left neuroforaminal stenosis.  No overall central canal stenosis.  C6-C7: There is a broad left paracentral disc protrusion which indents the ventral cord.  No abnormal cord signal  "appreciated.  There is a possible 3 mm right foraminal disc protrusion visible on series 7, image 22.  This could produce symptoms referable to the right C7 nerve.  No left or right neuroforaminal stenosis.  No central canal stenosis.  There is mild prominence of the central canal of the spinal cord which measures up to 2 mm in AP dimension on series 7, image 22.  C7-T1: There is a bulging posterior disc osteophyte complex which effaces the anterior CSF sleeve and extends into both neural foramina.  There is severe bilateral neuroforaminal stenosis which could produce symptoms referable to the C8 nerves.  There is mild bilateral facet arthropathy.      Labs:  No results found for: "LABA1C", "HGBA1C"    Lab Results   Component Value Date    WBC 5.01 08/12/2024    HGB 13.6 (L) 08/12/2024    HCT 40.9 08/12/2024    MCV 93 08/12/2024     08/12/2024           Problem List Items Addressed This Visit    None  Visit Diagnoses         Lumbar radiculopathy    -  Primary      Acute right-sided low back pain without sciatica          Lumbar radiculopathy, chronic        Relevant Medications    gabapentin (NEURONTIN) 300 MG capsule      Spinal stenosis of lumbar region, unspecified whether neurogenic claudication present          Lumbar spondylosis          Spinal stenosis of lumbar region without neurogenic claudication                Assessment:       5/27/2025: Srinivasan Bernal III returns to the office for follow up.  Today he is reporting worsening lower back pain, 10/10, across his lower back with radiation into right buttock, right lateral hip, constant, sharp, stabbing.  He denies any associated numbness, weakness or any new changes to his bowel or bladder function.  He is not finding relief with at-home PT directed exercises, NSAIDs and muscle relaxers.      - on exam he has full strength in his lower extremities.  - we reviewed his lumbar MRI together in office today and is consistent with L3-L4  prominent " ligament flavum thickening, severe right-sided foraminal narrowing with nerve root impingement, asymmetric right-sided severe disc bulging. L4-5 bilateral lateral recess narrowing with suspected L5 nerve root impingement bilaterally, severe right-sided foraminal narrowing with nerve root impingement, moderate severe left foraminal narrowing with nerve root impingement.  - he is likely having worsening lumbar radicular pain, this pain is limiting his mobility interfering with his ADLs and quality of life.  He is not finding relief with NSAIDs, at-home PT directed exercises and muscle relaxers.  Pain is too severe to restart formal physical therapy.  - for any neuropathic component we will trial gabapentin 300 mg nightly for the 1st week and if tolerating increase to twice daily.  - for his severe lumbar radicular pain I would like to schedule him for an L4/5 interlaminar epidural steroid injection.  - we will need to hold aspirin and NSAIDs prior.  - follow up 3 weeks post procedure sooner if needed.  If he fails get relief with this injection we will update his lumbar MRI.      : Not applicable    This note was completed with dictation software and grammatical errors may exist.

## 2025-05-27 NOTE — TELEPHONE ENCOUNTER
Spoke with patient and scheduled.Advised to hold Naprosyn and ASA x 4, Advil x 1 day prior. Pre op information given to patient and follow up appointment scheduled.

## 2025-06-06 DIAGNOSIS — M54.50 ACUTE RIGHT-SIDED LOW BACK PAIN WITHOUT SCIATICA: ICD-10-CM

## 2025-06-09 RX ORDER — METHOCARBAMOL 750 MG/1
750 TABLET, FILM COATED ORAL 4 TIMES DAILY PRN
Qty: 40 TABLET | Refills: 3 | Status: SHIPPED | OUTPATIENT
Start: 2025-06-09 | End: 2025-07-09

## 2025-06-10 ENCOUNTER — HOSPITAL ENCOUNTER (OUTPATIENT)
Dept: RADIOLOGY | Facility: HOSPITAL | Age: 80
Discharge: HOME | End: 2025-06-10
Attending: ANESTHESIOLOGY | Admitting: ANESTHESIOLOGY
Payer: MEDICARE

## 2025-06-10 ENCOUNTER — TELEPHONE (OUTPATIENT)
Dept: SURGERY | Facility: HOSPITAL | Age: 80
End: 2025-06-10
Payer: MEDICARE

## 2025-06-10 ENCOUNTER — OFFICE VISIT (OUTPATIENT)
Dept: FAMILY MEDICINE | Facility: CLINIC | Age: 80
End: 2025-06-10
Payer: MEDICARE

## 2025-06-10 ENCOUNTER — TELEPHONE (OUTPATIENT)
Dept: PAIN MEDICINE | Facility: CLINIC | Age: 80
End: 2025-06-10
Payer: MEDICARE

## 2025-06-10 VITALS
SYSTOLIC BLOOD PRESSURE: 110 MMHG | HEIGHT: 72 IN | DIASTOLIC BLOOD PRESSURE: 70 MMHG | BODY MASS INDEX: 22.69 KG/M2 | WEIGHT: 167.56 LBS | HEART RATE: 70 BPM | OXYGEN SATURATION: 96 %

## 2025-06-10 DIAGNOSIS — M54.50 LOWER BACK PAIN: ICD-10-CM

## 2025-06-10 DIAGNOSIS — M54.16 LUMBAR RADICULOPATHY: Primary | ICD-10-CM

## 2025-06-10 DIAGNOSIS — L03.031 PARONYCHIA OF GREAT TOE OF RIGHT FOOT: Primary | ICD-10-CM

## 2025-06-10 PROCEDURE — 99999 PR PBB SHADOW E&M-EST. PATIENT-LVL IV: CPT | Mod: PBBFAC,HCNC,, | Performed by: PHYSICIAN ASSISTANT

## 2025-06-10 PROCEDURE — 1125F AMNT PAIN NOTED PAIN PRSNT: CPT | Mod: CPTII,HCNC,S$GLB, | Performed by: PHYSICIAN ASSISTANT

## 2025-06-10 PROCEDURE — 3074F SYST BP LT 130 MM HG: CPT | Mod: CPTII,HCNC,S$GLB, | Performed by: PHYSICIAN ASSISTANT

## 2025-06-10 PROCEDURE — 3288F FALL RISK ASSESSMENT DOCD: CPT | Mod: CPTII,HCNC,S$GLB, | Performed by: PHYSICIAN ASSISTANT

## 2025-06-10 PROCEDURE — 99213 OFFICE O/P EST LOW 20 MIN: CPT | Mod: HCNC,S$GLB,, | Performed by: PHYSICIAN ASSISTANT

## 2025-06-10 PROCEDURE — 1101F PT FALLS ASSESS-DOCD LE1/YR: CPT | Mod: CPTII,HCNC,S$GLB, | Performed by: PHYSICIAN ASSISTANT

## 2025-06-10 PROCEDURE — 3078F DIAST BP <80 MM HG: CPT | Mod: CPTII,HCNC,S$GLB, | Performed by: PHYSICIAN ASSISTANT

## 2025-06-10 RX ORDER — CEPHALEXIN 500 MG/1
500 CAPSULE ORAL EVERY 12 HOURS
Qty: 14 CAPSULE | Refills: 0 | Status: SHIPPED | OUTPATIENT
Start: 2025-06-10 | End: 2025-06-17

## 2025-06-10 RX ORDER — ALPRAZOLAM 1 MG/1
1 TABLET, ORALLY DISINTEGRATING ORAL ONCE AS NEEDED
OUTPATIENT
Start: 2025-06-10 | End: 2036-11-06

## 2025-06-10 NOTE — TELEPHONE ENCOUNTER
Can you please reschedule this patient's lumbar MRI for Monday June 16th.  Okay to fit him over my lunch

## 2025-06-10 NOTE — PROGRESS NOTES
Subjective:      Patient ID: Srinivasan Bernal III is a 80 y.o. male.    Chief Complaint: Toe Pain (Right foot big toe nail has turned a blackish color and has some swelling and redness around it. X1 month)    HPI  Patient's active medical issues include HTN, hypercholesterolemia.     Patient reports right big toe pain for 1 month.  Taken no medication for this.    Review of Systems   Constitutional:  Negative for chills and fever.   Respiratory:  Negative for shortness of breath.    Cardiovascular:  Negative for chest pain.   Skin:         Right big toe pain       Objective:   /70   Pulse 70   Ht 6' (1.829 m)   Wt 76 kg (167 lb 8.8 oz)   SpO2 96%   BMI 22.72 kg/m²     Physical Exam  Vitals reviewed.   Constitutional:       Appearance: Normal appearance. He is well-developed.   HENT:      Head: Normocephalic and atraumatic.      Right Ear: External ear normal.      Left Ear: External ear normal.   Eyes:      Conjunctiva/sclera: Conjunctivae normal.   Cardiovascular:      Rate and Rhythm: Normal rate and regular rhythm.      Heart sounds: Normal heart sounds. No murmur heard.     No friction rub. No gallop.   Pulmonary:      Effort: Pulmonary effort is normal. No respiratory distress.      Breath sounds: Normal breath sounds. No wheezing, rhonchi or rales.   Musculoskeletal:         General: Normal range of motion.   Skin:     General: Skin is warm and dry.      Findings: Erythema (surrounding right great toenail) present. No rash.   Neurological:      General: No focal deficit present.      Mental Status: He is alert and oriented to person, place, and time.   Psychiatric:         Mood and Affect: Mood normal.         Behavior: Behavior normal.         Judgment: Judgment normal.       Assessment:      1. Paronychia of great toe of right foot       Plan:   1. Paronychia of great toe of right foot (Primary)  - cephALEXin (KEFLEX) 500 MG capsule; Take 1 capsule (500 mg total) by mouth every 12 (twelve) hours.  for 7 days  Dispense: 14 capsule; Refill: 0    Follow up as needed.  Patient agreed with plan and expressed understanding.    Thank you for allowing me to serve you,

## 2025-06-10 NOTE — TELEPHONE ENCOUNTER
Pt scheduled for lumbar MARITZA today. Pt took ASA yesterday. Dr. Huggins would like pt to be scheduled for Monday 6/16. Please reach out to patient to reschedule.

## 2025-06-10 NOTE — PATIENT INSTRUCTIONS
Take Keflex with food twice daily for 7 days.  Can do warm soapy water soaks when on antibiotics.    After completed, can try Vicks vaporub on nails twice daily for possible fungus.

## 2025-06-16 ENCOUNTER — HOSPITAL ENCOUNTER (OUTPATIENT)
Facility: HOSPITAL | Age: 80
Discharge: HOME | End: 2025-06-16
Attending: ANESTHESIOLOGY | Admitting: ANESTHESIOLOGY
Payer: MEDICARE

## 2025-06-16 ENCOUNTER — HOSPITAL ENCOUNTER (OUTPATIENT)
Dept: RADIOLOGY | Facility: HOSPITAL | Age: 80
Discharge: HOME | End: 2025-06-16
Attending: ANESTHESIOLOGY | Admitting: ANESTHESIOLOGY
Payer: MEDICARE

## 2025-06-16 VITALS
TEMPERATURE: 98 F | DIASTOLIC BLOOD PRESSURE: 67 MMHG | BODY MASS INDEX: 22.35 KG/M2 | SYSTOLIC BLOOD PRESSURE: 101 MMHG | HEART RATE: 69 BPM | OXYGEN SATURATION: 94 % | RESPIRATION RATE: 16 BRPM | WEIGHT: 165 LBS | HEIGHT: 72 IN

## 2025-06-16 DIAGNOSIS — M54.16 LUMBAR RADICULOPATHY: Primary | ICD-10-CM

## 2025-06-16 DIAGNOSIS — M54.50 LOWER BACK PAIN: ICD-10-CM

## 2025-06-16 PROCEDURE — 62323 NJX INTERLAMINAR LMBR/SAC: CPT | Mod: HCNC,PO | Performed by: ANESTHESIOLOGY

## 2025-06-16 PROCEDURE — A4216 STERILE WATER/SALINE, 10 ML: HCPCS | Mod: HCNC,PO | Performed by: ANESTHESIOLOGY

## 2025-06-16 PROCEDURE — 25500020 PHARM REV CODE 255: Mod: HCNC,PO | Performed by: ANESTHESIOLOGY

## 2025-06-16 PROCEDURE — 25000003 PHARM REV CODE 250: Mod: HCNC,PO | Performed by: ANESTHESIOLOGY

## 2025-06-16 PROCEDURE — 63600175 PHARM REV CODE 636 W HCPCS: Mod: HCNC,PO | Performed by: ANESTHESIOLOGY

## 2025-06-16 PROCEDURE — 62323 NJX INTERLAMINAR LMBR/SAC: CPT | Mod: HCNC,,, | Performed by: ANESTHESIOLOGY

## 2025-06-16 RX ORDER — METHYLPREDNISOLONE ACETATE 80 MG/ML
INJECTION, SUSPENSION INTRA-ARTICULAR; INTRALESIONAL; INTRAMUSCULAR; SOFT TISSUE
Status: DISCONTINUED | OUTPATIENT
Start: 2025-06-16 | End: 2025-06-16 | Stop reason: HOSPADM

## 2025-06-16 RX ORDER — SODIUM CHLORIDE 9 MG/ML
INJECTION, SOLUTION INTRAMUSCULAR; INTRAVENOUS; SUBCUTANEOUS
Status: DISCONTINUED | OUTPATIENT
Start: 2025-06-16 | End: 2025-06-16 | Stop reason: HOSPADM

## 2025-06-16 RX ORDER — LIDOCAINE HYDROCHLORIDE 10 MG/ML
INJECTION, SOLUTION EPIDURAL; INFILTRATION; INTRACAUDAL; PERINEURAL
Status: DISCONTINUED | OUTPATIENT
Start: 2025-06-16 | End: 2025-06-16 | Stop reason: HOSPADM

## 2025-06-16 RX ORDER — ALPRAZOLAM 0.5 MG/1
1 TABLET, ORALLY DISINTEGRATING ORAL ONCE AS NEEDED
Status: COMPLETED | OUTPATIENT
Start: 2025-06-16 | End: 2025-06-16

## 2025-06-16 RX ADMIN — ALPRAZOLAM 1 MG: 0.5 TABLET, ORALLY DISINTEGRATING ORAL at 11:06

## 2025-06-16 NOTE — DISCHARGE SUMMARY
[facility]  Discharge Note  Short Stay    Admit Date: 6/16/2025    Discharge Date: 6/16/2025    Attending Physician: Jordin Huggins     Discharge Provider: Jordin Huggins    Diagnoses:  There are no hospital problems to display for this patient.      Discharged Condition: Good    Final Diagnoses: Lumbar radiculopathy [M54.16]    Disposition: Home or Self Care    Hospital Course: No complications, uneventful    Outcome of Hospitalization, Treatment, Procedure, or Surgery:  Patient was admitted for outpatient interventional pain management procedure. The patient tolerated the procedure well with no complications.    Follow up/Patient Instructions:  Follow up as scheduled in Pain Management office in 2-3 weeks.  Patient has received instructions and follow up date and time.    Medications:  Continue previous medications, except restart aspirin in 24 hours    Discharge Procedure Orders   Notify your health care provider if you experience any of the following:  temperature >100.4     Notify your health care provider if you experience any of the following:  persistent nausea and vomiting or diarrhea     Notify your health care provider if you experience any of the following:  severe uncontrolled pain     Notify your health care provider if you experience any of the following:  redness, tenderness, or signs of infection (pain, swelling, redness, odor or green/yellow discharge around incision site)     Notify your health care provider if you experience any of the following:  difficulty breathing or increased cough     Notify your health care provider if you experience any of the following:  severe persistent headache     Notify your health care provider if you experience any of the following:  worsening rash     Notify your health care provider if you experience any of the following:  persistent dizziness, light-headedness, or visual disturbances     Notify your health care provider if you experience any of the  following:  increased confusion or weakness     Activity as tolerated

## 2025-06-16 NOTE — DISCHARGE INSTRUCTIONS

## 2025-06-16 NOTE — OP NOTE
"Procedure Note    Procedure Date: 6/16/2025    Procedure Performed:  L4/5 lumbar interlaminar epidural steroid injection under fluoroscopy.    Indications:  Srinivasan Bernal III presents with lumbar radiculitis/radiculopathy secondary to disc herniation, osteophyte/osteophyte complexes, and/or severe degenerative disc disease producing foraminal or central spinal stenosis.  The pain has been present for at least 4 weeks and the patient has failed to respond to noninvasive conservative care.  Pain rated by NRS at baseline prior to intervention is 6/10.  Their radiculitis/radiculopathy and/or neurogenic claudication is severe enough to greatly impact their quality of life or function.     Pre-op diagnosis: Lumbar Radiculopathy    Post-op diagnosis: same    Physician: Jordin Huggins MD    IV anxiolysis medications: none    Medications injected: depomedrol 80mg, 1% Lidocaine 1ml, 2 mL sterile, preservative-free normal saline.    Local anesthetic used: 1% Lidocaine, 1 ml    Estimated Blood Loss: none    Complications:  none    Technique:  The patient was interviewed in the holding area and Risks/Benefits were discussed, including, but not limited to, the possibility of new or different pain, bleeding or infection.   All questions were answered.  The patient understood and accepted risks.  Consent was verfied.  A time-out was taken to identify patient and procedure prior to starting the procedure. The patient was placed in the prone position on the fluoroscopy table. The area of the lumbar spine was prepped with Chloraprep x2 and draped in a sterile manner. The L4/5 interspace was identified and marked under AP fluoroscopy. The skin and subcutaneous tissues overlying the targeted interspace were anesthetized with 3-5 mL of 1% lidocaine using a 25G, 1.5" needle.  A 20G, 3.5" Tuohy epidural needle was directed toward the interspace under fluoroscopic guidance until the ligamentum flavum was engaged. From this point, a loss " of resistance technique with a glass syringe and saline was used to identify entrance of the needle into the epidural space. Once loss of resistance was observed 1 mL of contrast solution was injected. An appropriate epidurogram was noted.  A 4 mL mixture consisting of saline, 1 mL 1% Lidocaine and 80 mg of depomedrol was injected slowly and without resistance.  The needle was flushed with normal saline and removed. The contrast was seen to be displaced after injection. Patient was awake/responsive during all injections.  The patient tolerated the procedure well and was transferred to the [facility] in stable condition.  The patient was monitored after the procedure and was given post-procedure and discharge instructions to follow at home. The patient was discharged in a stable condition.

## 2025-06-16 NOTE — PLAN OF CARE
Plan of care discussed with patient. All questions and concerns addressed and answered. Free of pain or distress. VS stable. Procedure report and discharge instructions gone over and patient aware of restrictions and when to resume medications. Patient in agreement.

## 2025-06-20 ENCOUNTER — TELEPHONE (OUTPATIENT)
Dept: GASTROENTEROLOGY | Facility: CLINIC | Age: 80
End: 2025-06-20
Payer: MEDICARE

## 2025-06-20 NOTE — TELEPHONE ENCOUNTER
Copied from CRM #[id number]. Topic: Appointments - Appointment Access  >> Jun 19, 2025  2:46 PM Chinyere wrote:  Type: Needs Medical Advice  Who Called:  Pt     Best Call Back Number: [phone]    Additional Information: Pt got letter in the mail reminding him he needs his 3 year colonoscopy, please call to schedule        Spoke with patient. Called to schedule 3 year colonoscopy. Patient stated he's driving and would like to schedule via portal. Sent colonoscopy questionnaire to patient's portal for scheduling. Patient aware and understands. Pt. has no further questions.

## 2025-06-20 NOTE — TELEPHONE ENCOUNTER
Copied from CRM #[id number]. Topic: Appointments - Appointment Access  >> Jun 19, 2025  2:46 PM Chinyere wrote:  Type: Needs Medical Advice  Who Called:  Pt     Best Call Back Number: [phone]    Additional Information: Pt got letter in the mail reminding him he needs his 3 year colonoscopy, please call to schedule

## 2025-06-23 ENCOUNTER — TELEPHONE (OUTPATIENT)
Dept: GASTROENTEROLOGY | Facility: CLINIC | Age: 80
End: 2025-06-23
Payer: MEDICARE

## 2025-06-23 NOTE — TELEPHONE ENCOUNTER
Copied from CRM #[id number]. Topic: General Inquiry - Return Call  >> Jun 23, 2025 11:35 AM Sophy wrote:  Type:  Patient Returning Call    Who Called:pt   Who Left Message for Patient: Eliza  Does the patient know what this is regarding?:donna colonoscopy   Would the patient rather a call back or a response via [facility]? Call   Best Call Back Number [phone]  Additional Information: Pt would like to donna procedure at the [place] location    thank you

## 2025-06-26 ENCOUNTER — OFFICE VISIT (OUTPATIENT)
Dept: PAIN MEDICINE | Facility: CLINIC | Age: 80
End: 2025-06-26
Payer: MEDICARE

## 2025-06-26 VITALS
BODY MASS INDEX: 22.74 KG/M2 | WEIGHT: 167.69 LBS | DIASTOLIC BLOOD PRESSURE: 75 MMHG | HEART RATE: 66 BPM | SYSTOLIC BLOOD PRESSURE: 122 MMHG

## 2025-06-26 DIAGNOSIS — M54.16 LUMBAR RADICULOPATHY, CHRONIC: ICD-10-CM

## 2025-06-26 DIAGNOSIS — M48.061 SPINAL STENOSIS OF LUMBAR REGION WITHOUT NEUROGENIC CLAUDICATION: ICD-10-CM

## 2025-06-26 DIAGNOSIS — M54.12 CERVICAL RADICULOPATHY: ICD-10-CM

## 2025-06-26 DIAGNOSIS — M48.061 SPINAL STENOSIS OF LUMBAR REGION, UNSPECIFIED WHETHER NEUROGENIC CLAUDICATION PRESENT: ICD-10-CM

## 2025-06-26 DIAGNOSIS — G89.29 CHRONIC PAIN OF RIGHT KNEE: ICD-10-CM

## 2025-06-26 DIAGNOSIS — G89.29 CHRONIC FOOT PAIN, LEFT: ICD-10-CM

## 2025-06-26 DIAGNOSIS — M25.561 CHRONIC PAIN OF RIGHT KNEE: ICD-10-CM

## 2025-06-26 DIAGNOSIS — M54.16 LUMBAR RADICULOPATHY: Primary | ICD-10-CM

## 2025-06-26 DIAGNOSIS — M47.816 LUMBAR SPONDYLOSIS: ICD-10-CM

## 2025-06-26 DIAGNOSIS — M54.2 CERVICALGIA: ICD-10-CM

## 2025-06-26 DIAGNOSIS — M79.672 CHRONIC FOOT PAIN, LEFT: ICD-10-CM

## 2025-06-26 PROCEDURE — 99999 PR PBB SHADOW E&M-EST. PATIENT-LVL III: CPT | Mod: PBBFAC,HCNC,,

## 2025-06-26 NOTE — PROGRESS NOTES
[facility]      PCP:   Ray Anna MD    CC:   Chief Complaint   Patient presents with    Back Pain          6/26/2025     2:05 PM 5/27/2025     1:07 PM 3/19/2025     8:34 AM   Last 3 PDI Scores   Pain Disability Index (PDI) 10 41 6     Interval HPI 6/26/2025: Srinivasan Bernal III returns to the office for follow up.  He is s/p L4/5 MARITZA on 06/16/2025 with 100% relief.  Previous pain radiating into right buttock and right lateral hip, leg has resolved.  He denies any new numbness, weakness or any new changes to his bowel or bladder function.  He continues to find added benefit with NSAIDs.  He is going to maintain his at-home PT directed exercises.        Pain Intervention History:  - s/p cervical C7-T1 MARITZA on 2/26/25  - s/p L4/5 MARITZA on 06/16/2025 with 100% relief.    Past Spine Surgical History:  none      HPI:   Mr. Mai returns for follow up of back pain after PT.  Last seen right lower back and buttock pain that increased with walking and improved with sitting.  He compelded medrol taper and has been working with PT.  While PT has helped improve, but not resolve right lower back pain, he has developed weakness in the right quadriceps.  PT contacted me 5/29/23 about acute onset weakness in the right quadriceps.  Mr. Mai has noticed diffculty stepping up onto a curb because of the weakness.    He has some history of right knee welling since a replacement; he had an xray of the knee and is scheduled to see orthopedics soon for further assessment.     Initial HPI:  Srinivasan Bernal III is a 80 y.o. male with history of CAD presents with lower back pain.  He has had similar pain in 2007 and 2011 with benefit from PT in the past.  Current pain started 3/22/23 without triggering event.  He has pain in the right lower lumbar region/ buttock.  No radicular leg pain, numbness or tignling.  Pain is worse with walking and improves with sitting.  No pain with laying down.  Has tried  ibuprofen.    He had MRI and xray in the past showing right L5 pars deffect and anterolisthesis of L5 on S1.      Past and current medications:  Antineuropathics:  NSAIDs:  ibuprofen  Antidepressants:  Muscle relaxers:  Opioids:  Antiplatelets/Anticoagulants:  ASA  Others: completed medrol taper    Physical therapy/ Chiropractic care:  PT in the past and currently going with benefit      History:    Current Outpatient Medications:     aspirin (ECOTRIN) 81 MG EC tablet, Take 81 mg by mouth once daily., Disp: , Rfl:     atorvastatin (LIPITOR) 80 MG tablet, Take 1 tablet (80 mg total) by mouth every evening., Disp: 90 tablet, Rfl: 2    ezetimibe (ZETIA) 10 mg tablet, TAKE 1 TABLET ONE TIME DAILY, Disp: 90 tablet, Rfl: 3    famotidine (PEPCID) 40 MG tablet, Take 1 tablet by mouth once daily., Disp: , Rfl:     gabapentin (NEURONTIN) 300 MG capsule, Take 1 capsule (300 mg total) by mouth 2 (two) times daily., Disp: 60 capsule, Rfl: 01    ibuprofen (ADVIL,MOTRIN) 200 MG tablet, Take 200 mg by mouth every 6 (six) hours as needed for Pain., Disp: , Rfl:     ipratropium (ATROVENT) 42 mcg (0.06 %) nasal spray, SMARTSI-2 Spray(s) Both Nares 1-3 Times Daily, Disp: , Rfl:     methocarbamoL (ROBAXIN) 750 MG Tab, Take 1 tablet (750 mg total) by mouth 4 (four) times daily as needed (muscle pain)., Disp: 40 tablet, Rfl: 3    perindopril erbumine (ACEON) 4 mg tablet, TAKE 1 TABLET EVERY DAY, Disp: 90 tablet, Rfl: 2    methylPREDNISolone (MEDROL DOSEPACK) 4 mg tablet, use as directed (Patient not taking: Reported on 1/10/2025), Disp: 21 tablet, Rfl: 0    naproxen (NAPROSYN) 500 MG tablet, Take 1 tablet (500 mg total) by mouth 2 (two) times daily as needed., Disp: 45 tablet, Rfl: 1    tadalafiL (CIALIS) 20 MG Tab, Take 1 tablet (20 mg total) by mouth once daily., Disp: 30 tablet, Rfl: 11    Past Medical History:   Diagnosis Date    Cataracts, bilateral     2009, 2013    Coronary artery disease 2004    Hypercholesteremia     Kidney  stone 2015    Urerteroscopy    Skin cancer 2015    left ankle/ left ear       Past Surgical History:   Procedure Laterality Date    ANKLE FUSION      CIRCUMCISION      COLONOSCOPY N/A 09/20/2022    Procedure: COLONOSCOPY;  Surgeon: Natasha Sommer MD;  Location: [facility] ENDO;  Service: Endoscopy;  Laterality: N/A;    EPIDURAL STEROID INJECTION INTO CERVICAL SPINE N/A 2/26/2025    Procedure: Injection-steroid-epidural-cervical    C7/T1;  Surgeon: Jordin Huggins MD;  Location: [facility] OR;  Service: Pain Management;  Laterality: N/A;  normal    EPIDURAL STEROID INJECTION INTO LUMBAR SPINE N/A 6/16/2025    Procedure: Injection-steroid-epidural-lumbar L4/5;  Surgeon: Jordin Huggins MD;  Location: [facility] OR;  Service: Pain Management;  Laterality: N/A;    FOOT SURGERY      HERNIA REPAIR Left 2008    KNEE ARTHROSCOPY Left 2010    KNEE ARTHROSCOPY Right 2010    KNEE SURGERY Left 2010    partial replacement    KNEE SURGERY Right 2012    replacement    MOUTH SURGERY      STENT, DRUG ELUTING, SINGLE VESSEL, CORONARY      TOE SURGERY      TONSILLECTOMY         Family History   Problem Relation Name Age of Onset    Heart disease Father      Dementia Mother         Social History     Socioeconomic History    Marital status:    Tobacco Use    Smoking status: Former     Types: Cigars    Smokeless tobacco: Never   Substance and Sexual Activity    Alcohol use: Yes     Comment: rarely    Drug use: No    Sexual activity: Yes     Social Drivers of Health     Financial Resource Strain: Low Risk  (4/14/2025)    Overall Financial Resource Strain (CARDIA)     Difficulty of Paying Living Expenses: Not hard at all   Food Insecurity: No Food Insecurity (4/14/2025)    Hunger Vital Sign     Worried About Running Out of Food in the Last Year: Never true     Ran Out of Food in the Last Year: Never true   Transportation Needs: No Transportation Needs (4/14/2025)    PRAPARE - Transportation     Lack of Transportation (Medical): No     Lack of  Transportation (Non-Medical): No   Physical Activity: Sufficiently Active (4/14/2025)    Exercise Vital Sign     Days of Exercise per Week: 7 days     Minutes of Exercise per Session: 60 min   Stress: No Stress Concern Present (4/14/2025)    [employer] [place] [employer] - Occupational Stress Questionnaire     Feeling of Stress : Not at all   Housing Stability: Low Risk  (4/14/2025)    Housing Stability Vital Sign     Unable to Pay for Housing in the Last Year: No     Number of Times Moved in the Last Year: 0     Homeless in the Last Year: No       Review of patient's allergies indicates:  No Known Allergies    Review of Systems:  Right low back/ buttock pain.  Balance of review of systems is negative.    Physical Exam:  Vitals:    06/26/25 1406   BP: 122/75   Pulse: 66   Weight: 76.1 kg (167 lb 10.6 oz)   PainSc: 1    PainLoc: Back           Body mass index is 22.74 kg/m².      Gen: A and O x3, pleasant, well-groomed  Skin: No rashes or obvious lesions  HEENT: PERRLA, no obvious deformities on ears or in canals.Trachea midline.  CVS: Regular rate and rhythm, normal palpable pulses.  Resp: Clear to auscultation bilaterally, no wheezes or rales.  Abdomen: Soft, NT/ND.  Musculoskeletal: . No antalgic gait.     Neuro:      Neuro:    Iliopsoas Quadriceps Knee  Flexion Tibialis  anterior Gastro- cnemius EHL   Lower: R 5/5 5/5 5/5 5/5 5/5 5/5    L 5/5 5/5 5/5 5/5 5/5 5/5      Left  Right    Patellar DTR 0+ 0+   Achilles DTR 0+ 0+   Ramirez     Clonus     Babinski       Intact and symmetrical to light touch and pinprick in L1-S1 dermatomes bilaterally.    Lumbar spine:  Lumbar spine: ROM is full with flexion extension and oblique extension with no increased pain.    Michoacano's test causes no increased pain on either side.    Supine straight leg raise is negative bilaterally.    Internal and external rotation of the hip causes no increased pain on either side.  Myofascial exam: No tenderness to palpation across  lumbar paraspinous muscles.          Imaging:  MRI lumbar spine 6/8/23  FINDINGS:  Exaggerated generalized lumbar lordotic curvature L2-L5.  Kyphotic curvature T11-L2.  No acute fracture or destructive lesion evident.  Distal spinal cord and conus are grossly normal.     T11-T12 and T12-L1 demonstrate moderate disc space narrowing and mild disc bulge.  There is facet arthrosis at T11-T12 and mild foraminal narrowing.  L1-L2 severe disc space narrowing with near complete obliteration of the disc space, marginal retrolisthesis and osteophytic change along the endplate margin, mild moderate foraminal narrowing.  L2-L3 demonstrates moderate severe disc space narrowing, marginal retrolisthesis, moderate osteophytic change and desiccated disc bulge, moderate facet arthrosis, prominent ligament flavum thickening, moderate severe spinal canal narrowing with reduction of the AP diameter to 3-4 mm and near complete effacement of the CSF, moderate severe foraminal narrowing with nerve root distortion.  L3-L4 moderate disc space narrowing, desiccation, moderate severe disc bulge, moderate bilateral facet arthrosis with prominent ligament flavum thickening, mild moderate spinal canal narrowing reduction of the AP diameter to 5-6 mm, severe right-sided foraminal narrowing with nerve root impingement, asymmetric right-sided severe disc bulging.  L4-5 demonstrates severe disc space narrowing, 3-4 mm anterolisthesis, severe disc bulge with uncovering of the disc, severe facet arthrosis with osteophytic hypertrophy, bilateral lateral recess narrowing with suspected L5 nerve root impingement bilaterally, severe right-sided foraminal narrowing with nerve root impingement, moderate severe left foraminal narrowing with nerve root impingement.  L5-S1 demonstrates solid osseous fusion, anterolisthesis, moderate foraminal narrowing with nerve root distortion bilaterally.     Intra-abdominal organs and paraspinal soft tissues are normal.        MRI cervical spine 1/25/25  FINDINGS:  The visualized posterior fossa structures including the shanna, mid brain, and cerebellum are unremarkable.  No Chiari malformation.  The incidentally observed paranasal sinuses are clear.     The cervical vertebral bodies show normal height and signal intensity without evidence of acute compression fracture or pathologic marrow replacement process.  No spondylolisthesis.     There is reversal of the normal cervical lordosis which could relate to neck muscle spasm.  There is degenerative disc desiccation at every cervical and visualized upper thoracic level.  There is anterior marginal osteophyte formation and degenerative disc space narrowing at C5-C6, C6-C7, and C7-T1.     The cervical spinal cord is normal in signal intensity without evidence of cord edema, myelomalacia, or cord syrinx.  No epidural fluid collections or masses.     The incidentally observed soft tissues of the neck show no significant abnormalities.     C2-C3: There is right facet arthropathy.  No disc protrusion or extrusion. No central canal stenosis or neuroforaminal stenosis.  C3-C4: There is bilateral hypertrophic degenerative facet arthropathy and uncovertebral spurring noted.  There is a posterior disc osteophyte complex and effacement of the anterior CSF sleeve.  There is severe bilateral neuroforaminal stenosis.  There is mild overall central canal stenosis.  C4-C5: There is bilateral uncovertebral spurring.  There is prominent left facet arthropathy, left greater than right.  There is severe left neuroforaminal stenosis.  There is, at most, mild right neuroforaminal stenosis.  There is effacement of the anterior CSF sleeve.  No central canal stenosis  C5-C6: There is a  posterior disc osteophyte complex and a superimposed broad right paracentral disc protrusion which exerts mass effect on the right paracentral ventral cervical spinal cord.  No abnormal cord signal.  There is bilateral facet  "arthropathy.  There is severe right neuroforaminal stenosis.  There is moderate-severe left neuroforaminal stenosis.  No overall central canal stenosis.  C6-C7: There is a broad left paracentral disc protrusion which indents the ventral cord.  No abnormal cord signal appreciated.  There is a possible 3 mm right foraminal disc protrusion visible on series 7, image 22.  This could produce symptoms referable to the right C7 nerve.  No left or right neuroforaminal stenosis.  No central canal stenosis.  There is mild prominence of the central canal of the spinal cord which measures up to 2 mm in AP dimension on series 7, image 22.  C7-T1: There is a bulging posterior disc osteophyte complex which effaces the anterior CSF sleeve and extends into both neural foramina.  There is severe bilateral neuroforaminal stenosis which could produce symptoms referable to the C8 nerves.  There is mild bilateral facet arthropathy.      Labs:  No results found for: "LABA1C", "HGBA1C"    Lab Results   Component Value Date    WBC 5.01 08/12/2024    HGB 13.6 (L) 08/12/2024    HCT 40.9 08/12/2024    MCV 93 08/12/2024     08/12/2024           Problem List Items Addressed This Visit    None  Visit Diagnoses         Lumbar radiculopathy    -  Primary      Spinal stenosis of lumbar region, unspecified whether neurogenic claudication present          Lumbar spondylosis          Spinal stenosis of lumbar region without neurogenic claudication          Lumbar radiculopathy, chronic          Cervical radiculopathy          Cervicalgia          Chronic foot pain, left          Chronic pain of right knee                  Assessment:       6/26/2025: Srinivasan Bernal III returns to the office for follow up.  He is s/p L4/5 MARITZA on 06/16/2025 with 100% relief.  Previous pain radiating into right buttock and right lateral hip, leg has resolved.  He denies any new numbness, weakness or any new changes to his bowel or bladder function.  He continues " to find added benefit with NSAIDs.  He is going to maintain his at-home PT directed exercises.      -  He is s/p L4/5 MARITZA on 06/16/2025 with 100% relief.  - I independently reviewed his lumbar MRI and it is consistent with L3-L4  prominent ligament flavum thickening, severe right-sided foraminal narrowing with nerve root impingement, asymmetric right-sided severe disc bulging. L4-5 bilateral lateral recess narrowing with suspected L5 nerve root impingement bilaterally, severe right-sided foraminal narrowing with nerve root impingement, moderate severe left foraminal narrowing with nerve root impingement.  - he responded very well to the lumbar epidural and continues to find relief with cervical epidural.  - discussed maintaining his at-home PT directed exercises and NSAIDs.  - he has a history of right knee replacement, right knee continues to swell, at this time we will continue to monitor, if worsens can have him evaluated by Orthopedics.  - he has chronic left ankle pain from previous ankle fusion 20 years ago.  We will talk to my colleagues in PM&R, there maybe consideration for iovera  - follow up as needed, can repeat lumbar or cervical MARITZA when indicated.  If he fails get lasting relief with this lumbar epidural, we will update his lumbar MRI.        : Not applicable    This note was completed with dictation software and grammatical errors may exist.

## 2025-07-14 ENCOUNTER — TELEPHONE (OUTPATIENT)
Dept: GASTROENTEROLOGY | Facility: CLINIC | Age: 80
End: 2025-07-14
Payer: MEDICARE

## 2025-07-14 NOTE — TELEPHONE ENCOUNTER
Copied from CRM #[id number]. Topic: General Inquiry - Patient Advice  >> Jul 14, 2025 11:23 AM Lolis wrote:  Type:  Needs Medical Advice    Who Called: pt    Would the patient rather a call back or a response via [facility]?  Call back    Best Call Back Number:  [phone]    Additional Information:  has colonoscopy coming up on 08/13 and needs to know when/if to stop blood thinners    Please call to advise     Thanks

## 2025-07-21 ENCOUNTER — OFFICE VISIT (OUTPATIENT)
Dept: ORTHOPEDICS | Facility: CLINIC | Age: 80
End: 2025-07-21
Payer: MEDICARE

## 2025-07-21 DIAGNOSIS — G56.01 CARPAL TUNNEL SYNDROME ON RIGHT: Primary | ICD-10-CM

## 2025-07-21 PROCEDURE — 99213 OFFICE O/P EST LOW 20 MIN: CPT | Mod: HCNC,S$GLB,, | Performed by: ORTHOPAEDIC SURGERY

## 2025-07-21 NOTE — PROGRESS NOTES
Mr Bernal returns to clinic today.  There was some questionable carpal tunnel or cubital tunnel syndrome after he had a steroid injection in his spine.  He has a carpal tunnel injection which he states gave him pretty good relief.  States that he is not really having problems with his right arm or hand.  He is mainly complaining about middle to lower back pain now     Physical exam: Examination of the right hand and wrist reveals that there are severe arthritic changes.  Palpation produces minimal tenderness.  He does report grossly intact sensation.  Has negative Tinel's over the carpal tunnel and cubital tunnel     Assessment:  Possible cervical radiculopathy versus mild carpal tunnel syndrome     Plan:     1. His arm and hand at this time were not having significant problem and therefore I will continue to monitor     2. He continues to complain about his pain and I have encouraged him to continue to follow up with pain management     3.  Will follow up with me as needed

## 2025-08-06 ENCOUNTER — TELEPHONE (OUTPATIENT)
Dept: PODIATRY | Facility: CLINIC | Age: 80
End: 2025-08-06
Payer: MEDICARE

## 2025-08-06 NOTE — TELEPHONE ENCOUNTER
----- Message from Agueda sent at 8/6/2025  2:47 PM CDT -----  Regarding: Reschedule Appointment  Patient would like to reschedule his August 21st appointment with Dr. Lee to the next available appointment in September. Patient will look for the new appointment in the portal. For questions, patient can be reached at [phone].

## 2025-08-08 ENCOUNTER — PATIENT MESSAGE (OUTPATIENT)
Dept: FAMILY MEDICINE | Facility: CLINIC | Age: 80
End: 2025-08-08
Payer: MEDICARE

## 2025-08-08 DIAGNOSIS — E78.5 DYSLIPIDEMIA: ICD-10-CM

## 2025-08-08 RX ORDER — ATORVASTATIN CALCIUM 80 MG/1
80 TABLET, FILM COATED ORAL NIGHTLY
Qty: 90 TABLET | Refills: 1 | Status: SHIPPED | OUTPATIENT
Start: 2025-08-08

## 2025-08-08 NOTE — TELEPHONE ENCOUNTER
Refill Decision Note   Srinivasan Gabe  is requesting a refill authorization.  Brief Assessment and Rationale for Refill:  Approve     Medication Therapy Plan:         Comments:     Note composed:2:00 PM 08/08/2025

## 2025-08-11 DIAGNOSIS — E78.5 DYSLIPIDEMIA: ICD-10-CM

## 2025-08-11 RX ORDER — ATORVASTATIN CALCIUM 80 MG/1
80 TABLET, FILM COATED ORAL NIGHTLY
Qty: 90 TABLET | Refills: 1 | Status: CANCELLED | OUTPATIENT
Start: 2025-08-11

## 2025-08-12 ENCOUNTER — LAB VISIT (OUTPATIENT)
Dept: LAB | Facility: HOSPITAL | Age: 80
End: 2025-08-12
Attending: INTERNAL MEDICINE
Payer: MEDICARE

## 2025-08-12 DIAGNOSIS — E78.2 MIXED HYPERLIPIDEMIA: ICD-10-CM

## 2025-08-12 DIAGNOSIS — I10 ESSENTIAL HYPERTENSION: ICD-10-CM

## 2025-08-12 DIAGNOSIS — Z79.899 ENCOUNTER FOR LONG-TERM (CURRENT) USE OF MEDICATIONS: ICD-10-CM

## 2025-08-12 LAB
ABSOLUTE EOSINOPHIL (OHS): 0.21 K/UL
ABSOLUTE MONOCYTE (OHS): 0.41 K/UL (ref 0.3–1)
ABSOLUTE NEUTROPHIL COUNT (OHS): 3.24 K/UL (ref 1.8–7.7)
ALBUMIN SERPL BCP-MCNC: 3.9 G/DL (ref 3.5–5.2)
ALP SERPL-CCNC: 86 UNIT/L (ref 40–150)
ALT SERPL W/O P-5'-P-CCNC: 17 UNIT/L (ref 0–55)
ANION GAP (OHS): 7 MMOL/L (ref 8–16)
AST SERPL-CCNC: 26 UNIT/L (ref 0–50)
BASOPHILS # BLD AUTO: 0.07 K/UL
BASOPHILS NFR BLD AUTO: 1.4 %
BILIRUB SERPL-MCNC: 0.9 MG/DL (ref 0.1–1)
BUN SERPL-MCNC: 18 MG/DL (ref 8–23)
CALCIUM SERPL-MCNC: 9.2 MG/DL (ref 8.7–10.5)
CHLORIDE SERPL-SCNC: 110 MMOL/L (ref 95–110)
CHOLEST SERPL-MCNC: 140 MG/DL (ref 120–199)
CHOLEST/HDLC SERPL: 2.4 {RATIO} (ref 2–5)
CO2 SERPL-SCNC: 26 MMOL/L (ref 23–29)
CREAT SERPL-MCNC: 0.9 MG/DL (ref 0.5–1.4)
ERYTHROCYTE [DISTWIDTH] IN BLOOD BY AUTOMATED COUNT: 12.7 % (ref 11.5–14.5)
GFR SERPLBLD CREATININE-BSD FMLA CKD-EPI: >60 ML/MIN/1.73/M2
GLUCOSE SERPL-MCNC: 85 MG/DL (ref 70–110)
HCT VFR BLD AUTO: 42.8 % (ref 40–54)
HDLC SERPL-MCNC: 59 MG/DL (ref 40–75)
HDLC SERPL: 42.1 % (ref 20–50)
HGB BLD-MCNC: 14.1 GM/DL (ref 14–18)
IMM GRANULOCYTES # BLD AUTO: 0.02 K/UL (ref 0–0.04)
IMM GRANULOCYTES NFR BLD AUTO: 0.4 % (ref 0–0.5)
LDLC SERPL CALC-MCNC: 70.8 MG/DL (ref 63–159)
LYMPHOCYTES # BLD AUTO: 1.16 K/UL (ref 1–4.8)
MCH RBC QN AUTO: 30.5 PG (ref 27–31)
MCHC RBC AUTO-ENTMCNC: 32.9 G/DL (ref 32–36)
MCV RBC AUTO: 93 FL (ref 82–98)
NONHDLC SERPL-MCNC: 81 MG/DL
NUCLEATED RBC (/100WBC) (OHS): 0 /100 WBC
PLATELET # BLD AUTO: 193 K/UL (ref 150–450)
PMV BLD AUTO: 9.9 FL (ref 9.2–12.9)
POTASSIUM SERPL-SCNC: 4.5 MMOL/L (ref 3.5–5.1)
PROT SERPL-MCNC: 6.4 GM/DL (ref 6–8.4)
RBC # BLD AUTO: 4.62 M/UL (ref 4.6–6.2)
RELATIVE EOSINOPHIL (OHS): 4.1 %
RELATIVE LYMPHOCYTE (OHS): 22.7 % (ref 18–48)
RELATIVE MONOCYTE (OHS): 8 % (ref 4–15)
RELATIVE NEUTROPHIL (OHS): 63.4 % (ref 38–73)
SODIUM SERPL-SCNC: 143 MMOL/L (ref 136–145)
TRIGL SERPL-MCNC: 51 MG/DL (ref 30–150)
WBC # BLD AUTO: 5.11 K/UL (ref 3.9–12.7)

## 2025-08-12 PROCEDURE — 80053 COMPREHEN METABOLIC PANEL: CPT | Mod: HCNC

## 2025-08-12 PROCEDURE — 82465 ASSAY BLD/SERUM CHOLESTEROL: CPT | Mod: HCNC

## 2025-08-12 PROCEDURE — 36415 COLL VENOUS BLD VENIPUNCTURE: CPT | Mod: HCNC,PO

## 2025-08-12 PROCEDURE — 85025 COMPLETE CBC W/AUTO DIFF WBC: CPT | Mod: HCNC

## 2025-08-12 RX ORDER — ATORVASTATIN CALCIUM 80 MG/1
80 TABLET, FILM COATED ORAL DAILY
Qty: 90 TABLET | Refills: 3 | Status: SHIPPED | OUTPATIENT
Start: 2025-08-12 | End: 2026-08-12

## 2025-08-13 ENCOUNTER — ANESTHESIA EVENT (OUTPATIENT)
Dept: ENDOSCOPY | Facility: HOSPITAL | Age: 80
End: 2025-08-13
Payer: MEDICARE

## 2025-08-13 ENCOUNTER — HOSPITAL ENCOUNTER (OUTPATIENT)
Facility: HOSPITAL | Age: 80
Discharge: HOME | End: 2025-08-13
Attending: INTERNAL MEDICINE | Admitting: INTERNAL MEDICINE
Payer: MEDICARE

## 2025-08-13 ENCOUNTER — ANESTHESIA (OUTPATIENT)
Dept: ENDOSCOPY | Facility: HOSPITAL | Age: 80
End: 2025-08-13
Payer: MEDICARE

## 2025-08-13 VITALS
HEIGHT: 72 IN | BODY MASS INDEX: 22.62 KG/M2 | DIASTOLIC BLOOD PRESSURE: 58 MMHG | SYSTOLIC BLOOD PRESSURE: 100 MMHG | HEART RATE: 64 BPM | WEIGHT: 167 LBS | TEMPERATURE: 98 F | RESPIRATION RATE: 16 BRPM | OXYGEN SATURATION: 97 %

## 2025-08-13 DIAGNOSIS — Z86.0100 HX OF COLONIC POLYPS: ICD-10-CM

## 2025-08-13 PROCEDURE — 63600175 PHARM REV CODE 636 W HCPCS: Mod: HCNC,PO | Performed by: INTERNAL MEDICINE

## 2025-08-13 PROCEDURE — G0105 COLORECTAL SCRN; HI RISK IND: HCPCS | Mod: HCNC,,, | Performed by: INTERNAL MEDICINE

## 2025-08-13 PROCEDURE — 63600175 PHARM REV CODE 636 W HCPCS: Mod: HCNC,PO | Performed by: NURSE ANESTHETIST, CERTIFIED REGISTERED

## 2025-08-13 PROCEDURE — 37000008 HC ANESTHESIA 1ST 15 MINUTES: Mod: HCNC,PO | Performed by: INTERNAL MEDICINE

## 2025-08-13 PROCEDURE — G0105 COLORECTAL SCRN; HI RISK IND: HCPCS | Mod: HCNC,PO | Performed by: INTERNAL MEDICINE

## 2025-08-13 PROCEDURE — 37000009 HC ANESTHESIA EA ADD 15 MINS: Mod: HCNC,PO | Performed by: INTERNAL MEDICINE

## 2025-08-13 PROCEDURE — 25000003 PHARM REV CODE 250: Mod: HCNC,PO | Performed by: NURSE ANESTHETIST, CERTIFIED REGISTERED

## 2025-08-13 RX ORDER — SODIUM CHLORIDE 0.9 % (FLUSH) 0.9 %
10 SYRINGE (ML) INJECTION
Status: DISCONTINUED | OUTPATIENT
Start: 2025-08-13 | End: 2025-08-13 | Stop reason: HOSPADM

## 2025-08-13 RX ORDER — PROPOFOL 10 MG/ML
VIAL (ML) INTRAVENOUS
Status: DISCONTINUED | OUTPATIENT
Start: 2025-08-13 | End: 2025-08-13

## 2025-08-13 RX ORDER — LIDOCAINE HYDROCHLORIDE 20 MG/ML
JELLY TOPICAL
Status: DISCONTINUED | OUTPATIENT
Start: 2025-08-13 | End: 2025-08-13

## 2025-08-13 RX ORDER — PHENYLEPHRINE HYDROCHLORIDE 10 MG/ML
INJECTION INTRAVENOUS
Status: DISCONTINUED | OUTPATIENT
Start: 2025-08-13 | End: 2025-08-13

## 2025-08-13 RX ORDER — SODIUM CHLORIDE, SODIUM LACTATE, POTASSIUM CHLORIDE, CALCIUM CHLORIDE 600; 310; 30; 20 MG/100ML; MG/100ML; MG/100ML; MG/100ML
INJECTION, SOLUTION INTRAVENOUS CONTINUOUS
Status: DISCONTINUED | OUTPATIENT
Start: 2025-08-13 | End: 2025-08-13 | Stop reason: HOSPADM

## 2025-08-13 RX ADMIN — PROPOFOL 80 MG: 10 INJECTION, EMULSION INTRAVENOUS at 07:08

## 2025-08-13 RX ADMIN — GLYCOPYRROLATE 0.2 MG: 0.2 INJECTION, SOLUTION INTRAMUSCULAR; INTRAVENOUS at 08:08

## 2025-08-13 RX ADMIN — PROPOFOL 50 MG: 10 INJECTION, EMULSION INTRAVENOUS at 08:08

## 2025-08-13 RX ADMIN — PHENYLEPHRINE HYDROCHLORIDE 100 MCG: 10 INJECTION INTRAVENOUS at 08:08

## 2025-08-13 RX ADMIN — SODIUM CHLORIDE, POTASSIUM CHLORIDE, SODIUM LACTATE AND CALCIUM CHLORIDE: 600; 310; 30; 20 INJECTION, SOLUTION INTRAVENOUS at 07:08

## 2025-08-13 RX ADMIN — LIDOCAINE HYDROCHLORIDE 50 ML: 20 JELLY TOPICAL at 07:08

## 2025-08-19 ENCOUNTER — OFFICE VISIT (OUTPATIENT)
Dept: FAMILY MEDICINE | Facility: CLINIC | Age: 80
End: 2025-08-19
Payer: MEDICARE

## 2025-08-19 ENCOUNTER — LAB VISIT (OUTPATIENT)
Dept: LAB | Facility: HOSPITAL | Age: 80
End: 2025-08-19
Attending: INTERNAL MEDICINE
Payer: MEDICARE

## 2025-08-19 VITALS
TEMPERATURE: 98 F | OXYGEN SATURATION: 95 % | HEART RATE: 74 BPM | DIASTOLIC BLOOD PRESSURE: 72 MMHG | BODY MASS INDEX: 22.33 KG/M2 | WEIGHT: 164.88 LBS | HEIGHT: 72 IN | SYSTOLIC BLOOD PRESSURE: 110 MMHG

## 2025-08-19 DIAGNOSIS — E78.2 MIXED HYPERLIPIDEMIA: ICD-10-CM

## 2025-08-19 DIAGNOSIS — I10 ESSENTIAL HYPERTENSION: ICD-10-CM

## 2025-08-19 DIAGNOSIS — R53.83 FATIGUE, UNSPECIFIED TYPE: ICD-10-CM

## 2025-08-19 DIAGNOSIS — E53.1 PYRIDOXINE DEFICIENCY: ICD-10-CM

## 2025-08-19 DIAGNOSIS — E78.5 DYSLIPIDEMIA: ICD-10-CM

## 2025-08-19 DIAGNOSIS — R41.3 MEMORY CHANGE: ICD-10-CM

## 2025-08-19 DIAGNOSIS — Z72.89 OTHER PROBLEMS RELATED TO LIFESTYLE: ICD-10-CM

## 2025-08-19 DIAGNOSIS — Z00.00 ROUTINE PHYSICAL EXAMINATION: Primary | ICD-10-CM

## 2025-08-19 DIAGNOSIS — R41.3 OTHER AMNESIA: ICD-10-CM

## 2025-08-19 DIAGNOSIS — I25.112 ATHEROSCLEROSIS OF NATIVE CORONARY ARTERY OF NATIVE HEART WITH REFRACTORY ANGINA PECTORIS: ICD-10-CM

## 2025-08-19 LAB
ALBUMIN SERPL BCP-MCNC: 4 G/DL (ref 3.5–5.2)
ALP SERPL-CCNC: 97 UNIT/L (ref 40–150)
ALT SERPL W/O P-5'-P-CCNC: 17 UNIT/L (ref 0–55)
ANION GAP (OHS): 9 MMOL/L (ref 8–16)
AST SERPL-CCNC: 32 UNIT/L (ref 0–50)
BILIRUB SERPL-MCNC: 1 MG/DL (ref 0.1–1)
BUN SERPL-MCNC: 18 MG/DL (ref 8–23)
CALCIUM SERPL-MCNC: 10 MG/DL (ref 8.7–10.5)
CHLORIDE SERPL-SCNC: 110 MMOL/L (ref 95–110)
CHOLEST SERPL-MCNC: 138 MG/DL (ref 120–199)
CHOLEST/HDLC SERPL: 2.5 {RATIO} (ref 2–5)
CO2 SERPL-SCNC: 26 MMOL/L (ref 23–29)
CREAT SERPL-MCNC: 1 MG/DL (ref 0.5–1.4)
FOLATE SERPL-MCNC: 17.1 NG/ML (ref 4–24)
GFR SERPLBLD CREATININE-BSD FMLA CKD-EPI: >60 ML/MIN/1.73/M2
GLUCOSE SERPL-MCNC: 96 MG/DL (ref 70–110)
HDLC SERPL-MCNC: 55 MG/DL (ref 40–75)
HDLC SERPL: 39.9 % (ref 20–50)
LDLC SERPL CALC-MCNC: 69 MG/DL (ref 63–159)
NONHDLC SERPL-MCNC: 83 MG/DL
POTASSIUM SERPL-SCNC: 4.3 MMOL/L (ref 3.5–5.1)
PROT SERPL-MCNC: 6.6 GM/DL (ref 6–8.4)
SODIUM SERPL-SCNC: 145 MMOL/L (ref 136–145)
T PALLIDUM IGG+IGM SER QL: NORMAL
TRIGL SERPL-MCNC: 70 MG/DL (ref 30–150)
TSH SERPL-ACNC: 0.6 UIU/ML (ref 0.4–4)
VIT B12 SERPL-MCNC: 530 PG/ML (ref 210–950)

## 2025-08-19 PROCEDURE — 82746 ASSAY OF FOLIC ACID SERUM: CPT | Mod: HCNC

## 2025-08-19 PROCEDURE — 86593 SYPHILIS TEST NON-TREP QUANT: CPT | Mod: HCNC

## 2025-08-19 PROCEDURE — 84443 ASSAY THYROID STIM HORMONE: CPT | Mod: HCNC

## 2025-08-19 PROCEDURE — 99397 PER PM REEVAL EST PAT 65+ YR: CPT | Mod: HCNC,25,S$GLB, | Performed by: INTERNAL MEDICINE

## 2025-08-19 PROCEDURE — G2211 COMPLEX E/M VISIT ADD ON: HCPCS | Mod: HCNC,S$GLB,, | Performed by: INTERNAL MEDICINE

## 2025-08-19 PROCEDURE — 3078F DIAST BP <80 MM HG: CPT | Mod: CPTII,HCNC,S$GLB, | Performed by: INTERNAL MEDICINE

## 2025-08-19 PROCEDURE — 80053 COMPREHEN METABOLIC PANEL: CPT | Mod: HCNC

## 2025-08-19 PROCEDURE — 3288F FALL RISK ASSESSMENT DOCD: CPT | Mod: CPTII,HCNC,S$GLB, | Performed by: INTERNAL MEDICINE

## 2025-08-19 PROCEDURE — 1126F AMNT PAIN NOTED NONE PRSNT: CPT | Mod: CPTII,HCNC,S$GLB, | Performed by: INTERNAL MEDICINE

## 2025-08-19 PROCEDURE — 99214 OFFICE O/P EST MOD 30 MIN: CPT | Mod: HCNC,25,S$GLB, | Performed by: INTERNAL MEDICINE

## 2025-08-19 PROCEDURE — 1160F RVW MEDS BY RX/DR IN RCRD: CPT | Mod: CPTII,HCNC,S$GLB, | Performed by: INTERNAL MEDICINE

## 2025-08-19 PROCEDURE — 99999 PR PBB SHADOW E&M-EST. PATIENT-LVL V: CPT | Mod: PBBFAC,HCNC,, | Performed by: INTERNAL MEDICINE

## 2025-08-19 PROCEDURE — G0439 PPPS, SUBSEQ VISIT: HCPCS | Mod: HCNC,S$GLB,, | Performed by: INTERNAL MEDICINE

## 2025-08-19 PROCEDURE — 3074F SYST BP LT 130 MM HG: CPT | Mod: CPTII,HCNC,S$GLB, | Performed by: INTERNAL MEDICINE

## 2025-08-19 PROCEDURE — 36415 COLL VENOUS BLD VENIPUNCTURE: CPT | Mod: HCNC,PO

## 2025-08-19 PROCEDURE — 84207 ASSAY OF VITAMIN B-6: CPT | Mod: HCNC

## 2025-08-19 PROCEDURE — 1101F PT FALLS ASSESS-DOCD LE1/YR: CPT | Mod: CPTII,HCNC,S$GLB, | Performed by: INTERNAL MEDICINE

## 2025-08-19 PROCEDURE — 80061 LIPID PANEL: CPT | Mod: HCNC

## 2025-08-19 PROCEDURE — 1159F MED LIST DOCD IN RCRD: CPT | Mod: CPTII,HCNC,S$GLB, | Performed by: INTERNAL MEDICINE

## 2025-08-19 PROCEDURE — 82607 VITAMIN B-12: CPT | Mod: HCNC

## 2025-08-19 RX ORDER — ATORVASTATIN CALCIUM 80 MG/1
80 TABLET, FILM COATED ORAL NIGHTLY
Qty: 100 TABLET | Refills: 4 | Status: SHIPPED | OUTPATIENT
Start: 2025-08-19

## 2025-08-25 LAB — W VITAMIN B6: 18 UG/L

## 2025-08-28 ENCOUNTER — HOSPITAL ENCOUNTER (OUTPATIENT)
Dept: RADIOLOGY | Facility: HOSPITAL | Age: 80
Discharge: HOME | End: 2025-08-28
Attending: INTERNAL MEDICINE
Payer: MEDICARE

## 2025-08-28 DIAGNOSIS — R41.3 MEMORY CHANGE: ICD-10-CM

## 2025-08-28 DIAGNOSIS — R41.3 OTHER AMNESIA: ICD-10-CM

## 2025-08-28 PROCEDURE — 70551 MRI BRAIN STEM W/O DYE: CPT | Mod: 26,HCNC,, | Performed by: RADIOLOGY

## 2025-08-28 PROCEDURE — 70551 MRI BRAIN STEM W/O DYE: CPT | Mod: TC,HCNC,PO

## (undated) DEVICE — TRAY NERVE BLOCK

## (undated) DEVICE — GLOVE 7.5 PROTEXIS PI MICRO

## (undated) DEVICE — Device

## (undated) DEVICE — SOL SOD CHLORIDE 0.9% 10ML

## (undated) DEVICE — HANDLE SURG LIGHT NONRIGID

## (undated) DEVICE — SYR GLASS 5CC LUER LOK

## (undated) DEVICE — APPLICATOR CHLORAPREP CLR 10.5